# Patient Record
Sex: MALE | Race: WHITE | NOT HISPANIC OR LATINO | Employment: UNEMPLOYED | URBAN - METROPOLITAN AREA
[De-identification: names, ages, dates, MRNs, and addresses within clinical notes are randomized per-mention and may not be internally consistent; named-entity substitution may affect disease eponyms.]

---

## 2017-05-11 ENCOUNTER — TRANSFERRED RECORDS (OUTPATIENT)
Dept: HEALTH INFORMATION MANAGEMENT | Facility: CLINIC | Age: 57
End: 2017-05-11

## 2017-06-21 ENCOUNTER — TRANSFERRED RECORDS (OUTPATIENT)
Dept: HEALTH INFORMATION MANAGEMENT | Facility: CLINIC | Age: 57
End: 2017-06-21

## 2017-06-21 ENCOUNTER — MEDICAL CORRESPONDENCE (OUTPATIENT)
Dept: HEALTH INFORMATION MANAGEMENT | Facility: CLINIC | Age: 57
End: 2017-06-21

## 2017-07-05 ENCOUNTER — TRANSFERRED RECORDS (OUTPATIENT)
Dept: HEALTH INFORMATION MANAGEMENT | Facility: CLINIC | Age: 57
End: 2017-07-05

## 2017-09-08 ENCOUNTER — TRANSFERRED RECORDS (OUTPATIENT)
Dept: HEALTH INFORMATION MANAGEMENT | Facility: CLINIC | Age: 57
End: 2017-09-08

## 2017-09-18 ENCOUNTER — OFFICE VISIT (OUTPATIENT)
Dept: PHYSICAL MEDICINE AND REHAB | Facility: CLINIC | Age: 57
End: 2017-09-18

## 2017-09-18 VITALS
HEIGHT: 70 IN | DIASTOLIC BLOOD PRESSURE: 59 MMHG | HEART RATE: 63 BPM | WEIGHT: 195 LBS | BODY MASS INDEX: 27.92 KG/M2 | SYSTOLIC BLOOD PRESSURE: 132 MMHG

## 2017-09-18 DIAGNOSIS — N31.9 NEUROGENIC BLADDER: ICD-10-CM

## 2017-09-18 DIAGNOSIS — K59.2 NEUROGENIC BOWEL: ICD-10-CM

## 2017-09-18 DIAGNOSIS — M62.838 MUSCLE SPASTICITY: ICD-10-CM

## 2017-09-18 DIAGNOSIS — G82.20 PARAPLEGIA (H): Primary | ICD-10-CM

## 2017-09-18 PROBLEM — Z90.49 S/P CHOLECYSTECTOMY: Status: ACTIVE | Noted: 2017-06-19

## 2017-09-18 RX ORDER — IBUPROFEN 800 MG/1
800 TABLET, FILM COATED ORAL
COMMUNITY
Start: 2017-03-07 | End: 2018-04-22

## 2017-09-18 ASSESSMENT — PAIN SCALES - GENERAL: PAINLEVEL: MILD PAIN (3)
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
## 2017-09-18 NOTE — PATIENT INSTRUCTIONS
Start taking psyllium.  Would recommend a powder form.  Start with 1 tsp in at least 8 oz of fluid once per day.  Take it at the same time every day.  If things don't improve after 1-2 weeks, then increase to 1 tsp twice a day.  If still no improvement after taking it twice a day, call the clinic to talk with Dr. Belle.  Continue to do your bowel program daily.  Consistency is important!  Review the pamphlet about Peristeen - this might be the next step if fiber doesn't work.
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
## 2017-09-20 ENCOUNTER — TELEPHONE (OUTPATIENT)
Dept: PHYSICAL MEDICINE AND REHAB | Facility: CLINIC | Age: 57
End: 2017-09-20

## 2017-09-20 NOTE — TELEPHONE ENCOUNTER
Patient called stating that he did have a bowel movement yesterday, but afterwards he had severe leg spasticity and abdominal pain. His stool was soft formed. He usually has a BM very other day. He did not start the psyllium that Dr Belle suggested at the last appointment because he was afraid he would have diarrhea and more abdominal pain and spasms and sweating. He has no appetite Patient states that he does have hemmorroids and is wondering if that could be causing the pain and spasms. It was suggested to him to try Lidocaine gel when doing bowel program to help numb the area. Hopefully it will minimize the symptoms.

## 2017-09-22 ENCOUNTER — NURSE TRIAGE (OUTPATIENT)
Dept: NURSING | Facility: CLINIC | Age: 57
End: 2017-09-22

## 2017-09-22 ENCOUNTER — HOSPITAL ENCOUNTER (EMERGENCY)
Facility: CLINIC | Age: 57
Discharge: HOME | End: 2017-09-22
Attending: EMERGENCY MEDICINE | Admitting: EMERGENCY MEDICINE
Payer: MEDICARE

## 2017-09-22 ENCOUNTER — APPOINTMENT (OUTPATIENT)
Dept: GENERAL RADIOLOGY | Facility: CLINIC | Age: 57
End: 2017-09-22
Attending: EMERGENCY MEDICINE
Payer: MEDICARE

## 2017-09-22 VITALS
SYSTOLIC BLOOD PRESSURE: 111 MMHG | TEMPERATURE: 98.2 F | RESPIRATION RATE: 18 BRPM | OXYGEN SATURATION: 96 % | HEART RATE: 64 BPM | DIASTOLIC BLOOD PRESSURE: 70 MMHG

## 2017-09-22 DIAGNOSIS — G82.20 PARAPLEGIA (H): ICD-10-CM

## 2017-09-22 DIAGNOSIS — K62.89 RECTAL PAIN: ICD-10-CM

## 2017-09-22 DIAGNOSIS — K59.00 CONSTIPATION, UNSPECIFIED CONSTIPATION TYPE: ICD-10-CM

## 2017-09-22 DIAGNOSIS — K62.89 ANAL PAIN: ICD-10-CM

## 2017-09-22 DIAGNOSIS — K59.2 NEUROGENIC BOWEL: ICD-10-CM

## 2017-09-22 PROCEDURE — 74020 XR ABDOMEN 2 VW: CPT

## 2017-09-22 PROCEDURE — 99282 EMERGENCY DEPT VISIT SF MDM: CPT | Mod: Z6 | Performed by: EMERGENCY MEDICINE

## 2017-09-22 PROCEDURE — 99283 EMERGENCY DEPT VISIT LOW MDM: CPT | Performed by: EMERGENCY MEDICINE

## 2017-09-22 ASSESSMENT — ENCOUNTER SYMPTOMS
BACK PAIN: 0
RECTAL PAIN: 1
ADENOPATHY: 0
NECK PAIN: 0
CONSTIPATION: 1
LIGHT-HEADEDNESS: 0
BLOOD IN STOOL: 0
VOMITING: 0
ABDOMINAL DISTENTION: 0
NAUSEA: 0
FEVER: 0
DIARRHEA: 0
AGITATION: 0
SHORTNESS OF BREATH: 0
DIFFICULTY URINATING: 0
ABDOMINAL PAIN: 0
COLOR CHANGE: 0
BRUISES/BLEEDS EASILY: 0
CHILLS: 0
NECK STIFFNESS: 0
POLYDIPSIA: 0

## 2017-09-22 NOTE — ED AVS SNAPSHOT
[facility], Emergency Department    [address]    [place] [address]    Phone:  [phone]    Fax:  [phone]                                       Jose Lopez   MRN: [id number]    Department:  [facility], Emergency Department   Date of Visit:  9/22/2017           Patient Information     Date Of Birth          1960        Your diagnoses for this visit were:     Constipation, unspecified constipation type     Rectal pain        You were seen by Ludy Cota MD.        Discharge Instructions       Please make an appointment to follow up with Your Primary Care Provider in 3 days if you have any concerns. If your symptoms worsen please come back to the emergency department. Please start taking your stool softener that was prescribed to you by your physician.        Treating Constipation    Constipation is a common and often uncomfortable problem. Constipation means you have bowel movements fewer than 3 times per week, or strain to pass hard, dry stool. It can last a short time. Or it can be a problem that never seems to go away. The good news is that it can often be treated and controlled.  Eat more fiber  One of the best ways to help treat constipation is to increase your fiber intake. You can do this either through diet or by using fiber supplements. Fiber (in whole grains, fruits, and vegetables) adds bulk and absorbs water to soften the stool. This helps the stool pass through the colon more easily. When you increase your fiber intake, do it slowly to avoid side effects such as bloating. Also increase the amount of water that you drink. Eating more of the following foods can add fiber to your diet.    High-fiber cereals    Whole grains, bran, and brown rice    Vegetables such as carrots, broccoli, and greens    Fresh fruits (especially apples, pears, and dried fruits like raisins and apricots)    Nuts and legumes (especially beans such as lentils, kidney beans, and lima beans)  Get  physically active  Exercise helps improve the working of your colon which helps ease constipation. Try to get some physical activity every day. If you haven t been active for a while, talk to your healthcare provider before starting again.  Laxatives  Your healthcare provider may suggest an over-the-counter product to help ease your constipation. He or she may suggest the use of bulk-forming agents or laxatives. The use of laxatives, if used as directed, is common and safe. Follow directions carefully when using them. See your healthcare provider for new-onset constipation, or long-term constipation, to rule out other causes such as medicines or thyroid disease.  Date Last Reviewed: 7/1/2016 2000-2017 [employer]. [address] [place] [address], [place] [address]. All rights reserved. This information is not intended as a substitute for professional medical care. Always follow your healthcare professional's instructions.          Constipation (Adult)  Constipation means that you have bowel movements that are less frequent than usual. Stools often become very hard and difficult to pass.  Constipation is very common. At some point in life it affects almost everyone. Since everyone's bowel habits are different, what is constipation to one person may not be to another. Your healthcare provider may do tests to diagnose constipation. It depends on what he or she finds when evaluating you.    Symptoms of constipation include:    Abdominal pain    Bloating    Vomiting    Painful bowel movements    Itching, swelling, bleeding, or pain around the anus  Causes  Constipation can have many causes. These include:    Diet low in fiber    Too much dairy    Not drinking enough liquids    Lack of exercise or physical activity. This is especially true for older adults.    Changes in lifestyle or daily routine, including pregnancy, aging, work, and travel    Frequent use or misuse of laxatives    Ignoring the urge to have a  bowel movement or delaying it until later    Medicines, such as certain prescription pain medicines, iron supplements, antacids, certain antidepressants, and calcium supplements    Diseases like irritable bowel syndrome, bowel obstructions, stroke, diabetes, thyroid disease, Parkinson disease, hemorrhoids, and colon cancer  Complications  Potential complications of constipation can include:    Hemorrhoids    Rectal bleeding from hemorrhoids or anal fissures (skin tears)    Hernias    Dependency on laxatives    Chronic constipation    Fecal impaction    Bowel obstruction or perforation  Home care  All treatment should be done after talking with your healthcare provider. This is especially true if you have another medical problems, are taking prescription medicines, or are an older adult. Treatment most often involves lifestyle changes. You may also need medicines. Your healthcare provider will tell you which will work best for you. Follow the advice below to help avoid this problem in the future.  Lifestyle changes  These lifestyle changes can help prevent constipation:    Diet. Eat a high-fiber diet, with fresh fruit and vegetables, and reduce dairy intake, meats, and processed foods    Fluids. It's important to get enough fluids each day. Drink plenty of water when you eat more fiber. If you are on diet that limits the amount of fluid you can have, talk about this with your healthcare provider.    Regular exercise. Check with your healthcare provider first.  Medications  Take any medicines as directed. Some laxatives are safe to use only every now and then. Others can be taken on a regular basis. Talk with your doctor or pharmacist if you have questions.  Prescription pain medicines can cause constipation. If you are taking this kind of medicine, ask your healthcare provider if you should also take a stool softener.  Medicines you may take to treat constipation include:    Fiber supplements    Stool  softeners    Laxatives    Enemas    Rectal suppositories  Follow-up care  Follow up with your healthcare provider if symptoms don't get better in the next few days. You may need to have more tests or see a specialist.  Call 911  Call 911 if any of these occur:    Trouble breathing    Stiff, rigid abdomen that is severely painful to touch    Confusion    Fainting or loss of consciousness    Rapid heart rate    Chest pain  When to seek medical advice  Call your healthcare provider right away if any of these occur:    Fever over 100.4 F (38 C)    Failure to resume normal bowel movements    Pain in your abdomen or back gets worse    Nausea or vomiting    Swelling in your abdomen    Blood in the stool    Black, tarry stool    Involuntary weight loss    Weakness  Date Last Reviewed: 12/30/2015 2000-2017 [employer]. [address] [place] [address], [place] [address]. All rights reserved. This information is not intended as a substitute for professional medical care. Always follow your healthcare professional's instructions.            Future Appointments        Provider Department Dept Phone Center    11/29/2017 8:00 AM Jose Belle MD [facility] Physical Medicine and Rehabilitation [phone] [facility]      24 Hour Appointment Hotline       To make an appointment at any [facility], call [phone] ([phone]). If you don't have a family doctor or clinic, we will help you find one. [facility] clinics are conveniently located to serve the needs of you and your family.             Review of your medicines      Our records show that you are taking the medicines listed below. If these are incorrect, please call your family doctor or clinic.        Dose / Directions Last dose taken    baclofen 20 MG tablet   Commonly known as:  LIORESAL   Dose:  30 mg        Take 30 mg by mouth 4 times daily   Refills:  0        ibuprofen 800 MG tablet   Commonly known as:  ADVIL/MOTRIN   Dose:  800 mg        Take 800 mg by  "mouth   Refills:  0        klonoPIN 0.5 MG tablet   Dose:  0.5 mg   Generic drug:  clonazePAM        Take 0.5 mg by mouth 2 times daily   Refills:  0        oxybutynin 5 MG tablet   Commonly known as:  DITROPAN        Take by mouth 4 times daily   Refills:  0                Procedures and tests performed during your visit     Abdomen XR, 2 vw, flat and upright      Orders Needing Specimen Collection     None      Pending Results     Date and Time Order Name Status Description    9/22/2017 1004 Abdomen XR, 2 vw, flat and upright Preliminary             Pending Culture Results     No orders found from 9/20/2017 to 9/23/2017.            Pending Results Instructions     If you had any lab results that were not finalized at the time of your Discharge, you can call the ED Lab Result RN at [phone]. You will be contacted by this team for any positive Lab results or changes in treatment. The nurses are available 7 days a week from 10A to 6:30P.  You can leave a message 24 hours per day and they will return your call.        Thank you for choosing [facility]       Thank you for choosing [facility] for your care. Our goal is always to provide you with excellent care. Hearing back from our patients is one way we can continue to improve our services. Please take a few minutes to complete the written survey that you may receive in the mail after you visit with us. Thank you!        [employer] Information     [employer] lets you send messages to your doctor, view your test results, renew your prescriptions, schedule appointments and more. To sign up, go to [email or web address] . Click on \"Log in\" on the left side of the screen, which will take you to the Welcome page. Then click on \"Sign up Now\" on the right side of the page.     You will be asked to enter the access code listed below, as well as some personal information. Please follow the directions to create your username and password.     Your access code is: " [email or web address]  Expires: 10/16/2017  1:02 PM     Your access code will  in 90 days. If you need help or a new code, please call your [facility] clinic or [phone].        Care EveryWhere ID     This is your Care EveryWhere ID. This could be used by other organizations to access your [facility] medical records  [id number]        Equal Access to Services     [facility] Center[facility] : Hadii power venturao Sosocorro, waaxda luqadaha, qaybta kaalmada adeegyada, forrest torres . [facility] [phone].    ATENCIÓN: Si habla español, tiene a reyes disposición servicios gratuitos de asistencia lingüística. Llame al [phone].    We comply with applicable federal civil rights laws and [place] laws. We do not discriminate on the basis of race, color, national origin, age, disability sex, sexual orientation or gender identity.            After Visit Summary       This is your record. Keep this with you and show to your community pharmacist(s) and doctor(s) at your next visit.

## 2017-09-22 NOTE — TELEPHONE ENCOUNTER
Seen on Monday at the [facility]. Discussed bowel issues, neurogenic bowel. Thursday woke and he had to dig out stool for three hours. Unable to eat. He is in pain. He wants to be seen in the ER to get help with stool and get documentation for his MD at the [facility]. He also mentioned having trouble with urine. Advised the ER will see him about his bowel issues.  Ynes Cronin RN-[facility]

## 2017-09-22 NOTE — ED AVS SNAPSHOT
[facility], [place], Emergency Department    [address]    [facility] [address]    Phone:  [phone]    Fax:  [phone]                                       Jose Lopez   MRN: [id number]    Department:  [facility], Emergency Department   Date of Visit:  9/22/2017           After Visit Summary Signature Page     I have received my discharge instructions, and my questions have been answered. I have discussed any challenges I see with this plan with the nurse or doctor.    ..........................................................................................................................................  Patient/Patient Representative Signature      ..........................................................................................................................................  Patient Representative Print Name and Relationship to Patient    ..................................................               ................................................  Date                                            Time    ..........................................................................................................................................  Reviewed by Signature/Title    ...................................................              ..............................................  Date                                                            Time

## 2017-09-22 NOTE — ED PROVIDER NOTES
"  History     Chief Complaint   Patient presents with     Groin Pain     Rectal Bleeding     Bowel program yesterday with digital removal of large stool but has been having groin and rectal pain since.     HPI  Jose Lopez is a 57 year old male with a history of paraplegia and neurogenic bowel who presents with rectal pain. The patient reports that he has been seen by [facility] in the past but has now been sent into our care and being seen by Dr. Belle. The patient was seen by Dr. Belle on Monday, 4 days ago, and was started on 1 tsp of psyllium daily. Since, the patient reports that he is still having issues with his bowel movements. He reports that he has had a bowel movement 2 times since Monday but he had a hard bowel movement last night and has had severe pain since. The patient states that for 3 hours he was digging out stool. He states that he had so much stool that it \"tore him up.\" He reports rectalpain and anal pain. He denies rectal bleeding, abdominal, or pelvic pain. No fevers. The patient reports that he has been more clogged up lately than usual. We addressed the patient chief complaint of groin pain and states that he is not having groin pain and that he is here for pain in his anus.     I have reviewed the Medications, Allergies, Past Medical and Surgical History, and Social History in the Epic system.    Review of Systems   Constitutional: Negative for chills and fever.   HENT: Negative for congestion.    Eyes: Negative for visual disturbance.   Respiratory: Negative for shortness of breath.    Cardiovascular: Negative for chest pain.   Gastrointestinal: Positive for constipation and rectal pain. Negative for abdominal distention, abdominal pain, blood in stool, diarrhea, nausea and vomiting.   Endocrine: Negative for polydipsia and polyuria.   Genitourinary: Negative for difficulty urinating.   Musculoskeletal: Negative for back pain, neck pain and neck stiffness.   Skin: Negative for " color change.   Allergic/Immunologic: Negative for immunocompromised state.   Neurological: Negative for light-headedness.   Hematological: Negative for adenopathy. Does not bruise/bleed easily.   Psychiatric/Behavioral: Negative for agitation and behavioral problems.       Physical Exam   BP: 111/70  Pulse: 64  Temp: 98.2  F (36.8  C)  Resp: 17  SpO2: 96 %  Physical Exam   Constitutional: He is oriented to person, place, and time. He appears well-developed and well-nourished. No distress.   HENT:   Head: Normocephalic and atraumatic.   Mouth/Throat: Oropharynx is clear and moist. No oropharyngeal exudate.   Eyes: Conjunctivae and EOM are normal. No scleral icterus.   Neck: Normal range of motion.   Cardiovascular: Normal heart sounds and intact distal pulses.    Pulmonary/Chest: Breath sounds normal. No respiratory distress.   Abdominal: Soft. Bowel sounds are normal. He exhibits no distension. There is no tenderness. There is no rebound and no guarding.   Genitourinary: Penis normal. Rectal exam shows no external hemorrhoid, no internal hemorrhoid, no fissure, no tenderness and anal tone normal. Right testis shows no swelling and no tenderness. Left testis shows no swelling and no tenderness. No penile tenderness.   Genitourinary Comments: Several skin tags perianally. No edema, erythema, drainage, or bleeding surrounding and from anus. Minimal stool on digital rectal examination; stool is light brown. No tenderness with digital rectal exam.   Musculoskeletal: He exhibits no edema or tenderness.   Neurological: He is alert and oriented to person, place, and time. No cranial nerve deficit. Coordination ( T9 paraplegia) abnormal.   Skin: Skin is warm. No rash noted. He is not diaphoretic. No erythema.   Psychiatric: He has a normal mood and affect. His behavior is normal. Judgment and thought content normal.   Nursing note and vitals reviewed.      ED Course   9:40 AM  The patient was seen and examined by Dr. Cota  in Room 03.     ED Course     Procedures             Critical Care time:  none             Labs Ordered and Resulted from Time of ED Arrival Up to the Time of Departure from the ED - No data to display         Assessments & Plan (with Medical Decision Making)   57-year-old man presenting with anal/rectal pain since yesterday afternoon. Differential diagnosis: anal fissure, abrasion, constipation, perforated rectum.    After thorough history and physical exam patient appears to be in no acute distress. I do not see any signs of perianal or potentially perirectal abscess on the examination. No evidence of fissure. I will obtain an abdominal x-ray looking for any evidence of stool burden proximally or free air. I do not suspect the patient has small bowel obstruction or large bowel obstruction. Also, I do not believe he has an acute appendicitis.     I reviewed the patient s x-ray and I read the radiology report; there is no evidence of free air under the diaphragm. There is a significant amount of fecal material in the rectal region. This could be contributing to the patient s discomfort. I will treat him with a pink lady enema. He agrees with the plan.     I informed the patient of x-ray findings and the need for pink lady enema. He has declined enema stating that due to the lack of anal tone enema will not be effective. It will likely come out immediately. I believe that he is right. He would prefer to go home and take his oral stool softener that he was given by his physician earlier this week. At this point he is stable for discharge. He agrees to return if his symptoms worsen otherwise he will follow up with his primary physician.     I have reviewed the nursing notes.    I have reviewed the findings, diagnosis, plan and need for follow up with the patient.    Discharge Medication List as of 9/22/2017 11:21 AM          Final diagnoses:   Constipation, unspecified constipation type   Rectal pain   Ramses CASILLAS  APURVA Appiah, am serving as a trained medical scribe to document services personally performed by Ludy Cota MD, based on the provider's statements to me.   I, Ludy Cota MD, was physically present and have reviewed and verified the accuracy of this note documented by Ramses Appiah.      9/22/2017   [facility], [place], EMERGENCY DEPARTMENT     Ludy Cota MD  09/22/17 1198

## 2017-09-22 NOTE — DISCHARGE INSTRUCTIONS
Please make an appointment to follow up with Your Primary Care Provider in 3 days if you have any concerns. If your symptoms worsen please come back to the emergency department. Please start taking your stool softener that was prescribed to you by your physician.        Treating Constipation    Constipation is a common and often uncomfortable problem. Constipation means you have bowel movements fewer than 3 times per week, or strain to pass hard, dry stool. It can last a short time. Or it can be a problem that never seems to go away. The good news is that it can often be treated and controlled.  Eat more fiber  One of the best ways to help treat constipation is to increase your fiber intake. You can do this either through diet or by using fiber supplements. Fiber (in whole grains, fruits, and vegetables) adds bulk and absorbs water to soften the stool. This helps the stool pass through the colon more easily. When you increase your fiber intake, do it slowly to avoid side effects such as bloating. Also increase the amount of water that you drink. Eating more of the following foods can add fiber to your diet.    High-fiber cereals    Whole grains, bran, and brown rice    Vegetables such as carrots, broccoli, and greens    Fresh fruits (especially apples, pears, and dried fruits like raisins and apricots)    Nuts and legumes (especially beans such as lentils, kidney beans, and lima beans)  Get physically active  Exercise helps improve the working of your colon which helps ease constipation. Try to get some physical activity every day. If you haven t been active for a while, talk to your healthcare provider before starting again.  Laxatives  Your healthcare provider may suggest an over-the-counter product to help ease your constipation. He or she may suggest the use of bulk-forming agents or laxatives. The use of laxatives, if used as directed, is common and safe. Follow directions carefully when using them. See your  healthcare provider for new-onset constipation, or long-term constipation, to rule out other causes such as medicines or thyroid disease.  Date Last Reviewed: 7/1/2016 2000-2017 [employer]. [address] [place] [address], [place] [address]. All rights reserved. This information is not intended as a substitute for professional medical care. Always follow your healthcare professional's instructions.          Constipation (Adult)  Constipation means that you have bowel movements that are less frequent than usual. Stools often become very hard and difficult to pass.  Constipation is very common. At some point in life it affects almost everyone. Since everyone's bowel habits are different, what is constipation to one person may not be to another. Your healthcare provider may do tests to diagnose constipation. It depends on what he or she finds when evaluating you.    Symptoms of constipation include:    Abdominal pain    Bloating    Vomiting    Painful bowel movements    Itching, swelling, bleeding, or pain around the anus  Causes  Constipation can have many causes. These include:    Diet low in fiber    Too much dairy    Not drinking enough liquids    Lack of exercise or physical activity. This is especially true for older adults.    Changes in lifestyle or daily routine, including pregnancy, aging, work, and travel    Frequent use or misuse of laxatives    Ignoring the urge to have a bowel movement or delaying it until later    Medicines, such as certain prescription pain medicines, iron supplements, antacids, certain antidepressants, and calcium supplements    Diseases like irritable bowel syndrome, bowel obstructions, stroke, diabetes, thyroid disease, Parkinson disease, hemorrhoids, and colon cancer  Complications  Potential complications of constipation can include:    Hemorrhoids    Rectal bleeding from hemorrhoids or anal fissures (skin tears)    Hernias    Dependency on laxatives    Chronic  constipation    Fecal impaction    Bowel obstruction or perforation  Home care  All treatment should be done after talking with your healthcare provider. This is especially true if you have another medical problems, are taking prescription medicines, or are an older adult. Treatment most often involves lifestyle changes. You may also need medicines. Your healthcare provider will tell you which will work best for you. Follow the advice below to help avoid this problem in the future.  Lifestyle changes  These lifestyle changes can help prevent constipation:    Diet. Eat a high-fiber diet, with fresh fruit and vegetables, and reduce dairy intake, meats, and processed foods    Fluids. It's important to get enough fluids each day. Drink plenty of water when you eat more fiber. If you are on diet that limits the amount of fluid you can have, talk about this with your healthcare provider.    Regular exercise. Check with your healthcare provider first.  Medications  Take any medicines as directed. Some laxatives are safe to use only every now and then. Others can be taken on a regular basis. Talk with your doctor or pharmacist if you have questions.  Prescription pain medicines can cause constipation. If you are taking this kind of medicine, ask your healthcare provider if you should also take a stool softener.  Medicines you may take to treat constipation include:    Fiber supplements    Stool softeners    Laxatives    Enemas    Rectal suppositories  Follow-up care  Follow up with your healthcare provider if symptoms don't get better in the next few days. You may need to have more tests or see a specialist.  Call 911  Call 911 if any of these occur:    Trouble breathing    Stiff, rigid abdomen that is severely painful to touch    Confusion    Fainting or loss of consciousness    Rapid heart rate    Chest pain  When to seek medical advice  Call your healthcare provider right away if any of these occur:    Fever over 100.4 F  (38 C)    Failure to resume normal bowel movements    Pain in your abdomen or back gets worse    Nausea or vomiting    Swelling in your abdomen    Blood in the stool    Black, tarry stool    Involuntary weight loss    Weakness  Date Last Reviewed: 12/30/2015 2000-2017 [employer] [address] [place] [address]. All rights reserved. This information is not intended as a substitute for professional medical care. Always follow your healthcare professional's instructions.

## 2017-09-26 ENCOUNTER — TELEPHONE (OUTPATIENT)
Dept: PHYSICAL MEDICINE AND REHAB | Facility: CLINIC | Age: 57
End: 2017-09-26

## 2017-09-26 NOTE — TELEPHONE ENCOUNTER
----- Message from Cassandra Nolan RN sent at 9/26/2017  9:51 AM CDT -----  Regarding: FW: question  Contact: [phone]      ----- Message -----     From: Gladys Reyes LPN     Sent: 9/26/2017   7:55 AM       To: [facility]-  Subject: question                                         Caller name: patient [phone]    Treating provider/specialty: Yoshi    Nurse:    Best time to return call:    Message left?     Description of issue/question:  Said he took Psyllium aand on Fri am, he got up and was incont of runny stool and hasnt taken it since.  please address.  Encouraged patient to take psyllium daily to decrease constipation and diarrhea.

## 2017-09-26 NOTE — TELEPHONE ENCOUNTER
Returned call to Mr. Lopez. He took psyllium Thursday mid day and by Friday AM had loose incontinent bowel movement so he stopped and hasn't taken psyllium since. Hasn't had any subsequent BM's either and starting to cramp again.  I reiterated that he needs to stick with something for longer to get a sense of it's efficacy and steady state pattern impact on bowel regularity. For most people, fiber supplement won't cause a blow out diarrhea and more likely this represents another swing of built up stool.   Encouraged consistent use of the fiber, starting 1 teaspoon per day and follow pattern over time. Don't anticipate we'll get to perfect and zero incontinence given his loose sphincter but rather target overall improvement with continence and less to no abdominal pain and cramping.  From here our only options are bowel irrigation or surgery with ACE or colostomy.

## 2017-09-28 ENCOUNTER — TELEPHONE (OUTPATIENT)
Dept: PHYSICAL MEDICINE AND REHAB | Facility: CLINIC | Age: 57
End: 2017-09-28
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
## 2017-09-29 NOTE — TELEPHONE ENCOUNTER
"Return telephone call to Mr. Lopez. He is having ongoing significant \"burning sensations like hot sauce and sand going through me\". He wonders if this is typical for the psyllium which I was prescribing. He says he had increased cramping and could not sleep at all at night. He is pushing for what we can do next though I'm challenged in knowing where to go from here. While this description is slightly different than what he used with me in clinic, much of his challenges with his abdomen cramping and consistency with bowels have been challenges for him well before starting psyllium.    He has had extensive workup in different systems with numerous providers apparently without any clear ideas or answers. I would not expect psyllium to cause this burning type sensation. Constipation or other nociceptive pain generation (even if she does not localize well because of his spinal cord injury) can exacerbate spasticity and in turn abdominal muscle tightness but this may be difficult to differentiate from intestinal cramping.    I believe Jose would be best served continuing the workup through the providers that already have seen him extensively though it sounds as if did not have clear follow-up. I'm not clear what has been done through [employer] / [facility]. He says they were going to assign a care coordinator but that did not yet happened. He also identifies being told in a clinic visit that there was too much to cover and he would have to reschedule which frustrated him. I can appreciate given the nature of his dialogue during my consultation and a few times now on the phone that there may be some challenge in being efficient in a clinic visit setting. Regardless I believe this continuity of providers in knowing him over time would have more benefit than exploring new or different workup in a different system.    I am happy if he chooses to continue following him for spinal cord injury related issues including " neurogenic bowel. My insights may be more limited to finding balance in the constipation/diarrhea spectrum as well as improving continence. My next step from here would be bowel irrigation trial and after that he may well need anterograde colonic enema or colostomy surgery. I defer back to his primary provider and system any further workup for the gastroenterology or other providers. He says tomorrow morning he will call and reschedule with them.    For now if he really believes there is a link between feeling worse on the psyllium than he could stop that though I am not yet convinced this represents a failure of some of the bowel consistency that high-fiber may provide him.

## 2017-11-29 ENCOUNTER — OFFICE VISIT (OUTPATIENT)
Dept: PHYSICAL MEDICINE AND REHAB | Facility: CLINIC | Age: 57
End: 2017-11-29

## 2017-11-29 VITALS
SYSTOLIC BLOOD PRESSURE: 140 MMHG | OXYGEN SATURATION: 96 % | BODY MASS INDEX: 29.35 KG/M2 | WEIGHT: 205 LBS | HEIGHT: 70 IN | DIASTOLIC BLOOD PRESSURE: 75 MMHG | HEART RATE: 80 BPM

## 2017-11-29 DIAGNOSIS — G82.20 PARAPLEGIA (H): ICD-10-CM

## 2017-11-29 DIAGNOSIS — M62.838 MUSCLE SPASTICITY: ICD-10-CM

## 2017-11-29 DIAGNOSIS — N31.9 NEUROGENIC BLADDER: Primary | ICD-10-CM

## 2017-11-29 RX ORDER — CIPROFLOXACIN 500 MG/1
500 TABLET, FILM COATED ORAL
COMMUNITY
Start: 2017-11-27 | End: 2018-03-28

## 2017-11-29 RX ORDER — SILDENAFIL CITRATE 100 MG
100 TABLET ORAL PRN
Refills: 3 | COMMUNITY
Start: 2017-09-19 | End: 2023-05-13

## 2017-11-29 ASSESSMENT — PAIN SCALES - GENERAL: PAINLEVEL: NO PAIN (0)

## 2017-11-29 NOTE — PROGRESS NOTES
"Physical medicine repetition clinic note:    Jose Lopez is a 57-year-old with long-standing T9 FAITH A SCI, neurogenic bladder, neurogenic bowel who returns to the clinic having seen me for first rehabilitation consultation. He came to me in particular with challenges around his bowel management. He was having a lot of inconsistency and pain. He's had several emergency department visits, workup with other rehab physicians, gastroenterology and colorectal surgery. He historically was utilizing digital stimulation and it was a bit more effective but that is subsequently been much less consistent. At times he can go several days between bowel movements associated with very large movements. He has tried numerous different medications to address the generally more constipated end of spectrum. He has abdominal pain and spasms which can also impact his bladder with leaking during these times. He has had some urine infections and recently treated with antibiotic. His past workup also included 2 different colonoscopies, cholecystectomy, hemorrhoidectomy. He reports having recurrence of some hemorrhoids with some intermittent bleeding.     Her last visit did a lot of education around consistency of program. Tried adding psyllium which he found translate into more cramping and pain and did not normalize bowels very well. He is subsequently been taking just 100 mg of docusate most days but may skip here and there. We also discussed the concept of bowel irrigation program but we're wanting to finish optimizing a few other options first. Reports mentioning this to his other providers from gastroenterology and they agree with pursuing bowel irrigation.    Physical exam  /75 (BP Location: Right arm, Patient Position: Sitting, Cuff Size: Adult Large)  Pulse 80  Ht 5' 10\" (1.778 m)  Wt 205 lb (93 kg)  SpO2 96%  BMI 29.41 kg/m2   Alert, fluent speech and language. Arrives with manual wheelchair which appears to be fitting " well. No involuntary spasms noted in his lower extremities. No volitional movement in his legs consistent with his paraplegia.    Assessment and recommendations  1. Jose has significant difficulty regulating his neurogenic bowel, has tried numerous different approaches as detailed above. I'm unaware of anything else that could be explored to further optimize this other than pursuing bowel irrigation. I believe this would be more effective at emptying consistently without rebound loose stools or incontinence. He may be able to tolerate increased softeners which would promote better transit, less constipation but avoid the incontinence. At further education today with him including watching video on our clinic room computer.   2. I will write a formal letter of medical necessity and pursue approval of this and identifying medical vendor. He uses [employer] for his catheter supplies, currently [employer] straight 14 [place].   3. We will schedule follow-up with him when we have better sense of the approval process and can begin more thorough training.  4. In the meantime he will continue with docusate 100 mg I encouraged more consistent utilization of this every day. We'll also uses digital stimulation and other education that we have given.   5. Recommended changes to his neurogenic bladder management, some of the inconsistencies with that could be attributed to inconsistencies with the bowel program which we will focus on first.  6. Similarly spasticity optimization will be deferred until the bowels are better regulated. For the most part this is reasonable controlled.     40 minutes spent in direct patient interaction, greater than 80% in education and counseling.

## 2017-11-29 NOTE — PATIENT INSTRUCTIONS
Peristeen Bowel irrigation system  See below video or search for other videos from the company ([employer]) for further information  https://youtu.be/[email or web address]

## 2017-11-29 NOTE — LETTER
11/29/2017       RE: Jose Lopez  [address]  [facility] [address]     Dear Colleague,    Thank you for referring your patient, Jose Lopez, to the [facility] PHYSICAL MEDICINE AND REHABILITATION at [facility]. Please see a copy of my visit note below.    Physical medicine repetition clinic note:    Jose Lopez is a 57-year-old with long-standing T9 FAITH A SCI, neurogenic bladder, neurogenic bowel who returns to the clinic having seen me for first rehabilitation consultation. He came to me in particular with challenges around his bowel management. He was having a lot of inconsistency and pain. He's had several emergency department visits, workup with other rehab physicians, gastroenterology and colorectal surgery. He historically was utilizing digital stimulation and it was a bit more effective but that is subsequently been much less consistent. At times he can go several days between bowel movements associated with very large movements. He has tried numerous different medications to address the generally more constipated end of spectrum. He has abdominal pain and spasms which can also impact his bladder with leaking during these times. He has had some urine infections and recently treated with antibiotic. His past workup also included 2 different colonoscopies, cholecystectomy, hemorrhoidectomy. He reports having recurrence of some hemorrhoids with some intermittent bleeding.     Her last visit did a lot of education around consistency of program. Tried adding psyllium which he found translate into more cramping and pain and did not normalize bowels very well. He is subsequently been taking just 100 mg of docusate most days but may skip here and there. We also discussed the concept of bowel irrigation program but we're wanting to finish optimizing a few other options first. Reports mentioning this to his other providers from gastroenterology and they agree with  "pursuing bowel irrigation.    Physical exam  /75 (BP Location: Right arm, Patient Position: Sitting, Cuff Size: Adult Large)  Pulse 80  Ht 5' 10\" (1.778 m)  Wt 205 lb (93 kg)  SpO2 96%  BMI 29.41 kg/m2   Alert, fluent speech and language. Arrives with manual wheelchair which appears to be fitting well. No involuntary spasms noted in his lower extremities. No volitional movement in his legs consistent with his paraplegia.    Assessment and recommendations  1. Jose has significant difficulty regulating his neurogenic bowel, has tried numerous different approaches as detailed above. I'm unaware of anything else that could be explored to further optimize this other than pursuing bowel irrigation. I believe this would be more effective at emptying consistently without rebound loose stools or incontinence. He may be able to tolerate increased softeners which would promote better transit, less constipation but avoid the incontinence. At further education today with him including watching video on our clinic room computer.   2. I will write a formal letter of medical necessity and pursue approval of this and identifying medical vendor. He uses [employer] for his catheter supplies, currently [employer] straight 14 [place].   3. We will schedule follow-up with him when we have better sense of the approval process and can begin more thorough training.  4. In the meantime he will continue with docusate 100 mg I encouraged more consistent utilization of this every day. We'll also uses digital stimulation and other education that we have given.   5. Recommended changes to his neurogenic bladder management, some of the inconsistencies with that could be attributed to inconsistencies with the bowel program which we will focus on first.  6. Similarly spasticity optimization will be deferred until the bowels are better regulated. For the most part this is reasonable controlled.     40 minutes spent in direct patient " interaction, greater than 80% in education and counseling.    Again, thank you for allowing me to participate in the care of your patient.      Sincerely,    Jose Belle MD

## 2017-11-29 NOTE — MR AVS SNAPSHOT
After Visit Summary   11/29/2017    Jose Lopez    MRN: [id number]           Patient Information     Date Of Birth          1960        Visit Information        Provider Department      11/29/2017 8:00 AM Jose Belle MD [facility] Physical Medicine and Rehabilitation        Today's Diagnoses     Neurogenic bladder    -  1    Paraplegia (H)        Muscle spasticity          Care Instructions    Peristeen Bowel irrigation system  See below video or search for other videos from the company ([employer]) for further information  https://youtu.be/[email or web address]          Follow-ups after your visit        Follow-up notes from your care team     Return in about 2 months (around 1/29/2018).      Your next 10 appointments already scheduled     Mar 28, 2018  2:20 PM CDT   (Arrive by 2:05 PM)   Return Visit with Jose Belle MD   [facility] Physical Medicine and Rehabilitation ([facility])    [address] [place] [address] [phone]              Who to contact     Please call your clinic at [phone] to:    Ask questions about your health    Make or cancel appointments    Discuss your medicines    Learn about your test results    Speak to your doctor   If you have compliments or concerns about an experience at your clinic, or if you wish to file a complaint, please contact [facility] Patient Relations at [phone] or email us at [email or web address]         Additional Information About Your Visit        MyChart Information     [employer] is an electronic gateway that provides easy, online access to your medical records. With [employer], you can request a clinic appointment, read your test results, renew a prescription or communicate with your care team.     To sign up for [employer] visit the website at [email or web address]   You will be asked to enter the access code listed below, as well as some personal  "information. Please follow the directions to create your username and password.     Your access code is: [email or web address]  Expires: 2018  6:30 AM     Your access code will  in 90 days. If you need help or a new code, please contact your [facility] or call [phone] for assistance.        Care EveryWhere ID     This is your Care EveryWhere ID. This could be used by other organizations to access your [facility] medical records  [id number]        Your Vitals Were     Pulse Height Pulse Oximetry BMI (Body Mass Index)          80 1.778 m (5' 10\") 96% 29.41 kg/m2         Blood Pressure from Last 3 Encounters:   No data found for BP    Weight from Last 3 Encounters:   No data found for Wt              Today, you had the following     No orders found for display       Primary Care Provider Office Phone # Fax #    [facility] [phone] [address]  [facility] [address]        Equal Access to Services     JAYLEN DOUGLASS : Hadii aad ku hadasho Soomaali, waaxda luqadaha, qaybta kaalmada adeegyada, waxay idiin hayaan adeeg kharadileep torres . So [facility] [phone].    ATENCIÓN: Si habla español, tiene a reyes disposición servicios gratuitos de asistencia lingüística. Llame al [phone].    We comply with applicable federal civil rights laws and [place] laws. We do not discriminate on the basis of race, color, national origin, age, disability, sex, sexual orientation, or gender identity.            Thank you!     Thank you for choosing [facility] PHYSICAL MEDICINE AND REHABILITATION  for your care. Our goal is always to provide you with excellent care. Hearing back from our patients is one way we can continue to improve our services. Please take a few minutes to complete the written survey that you may receive in the mail after your visit with us. Thank you!             Your Updated Medication List - Protect others around you: Learn how to " safely use, store and throw away your medicines at www.disposemymeds.org.          This list is accurate as of: 11/29/17 11:59 PM.  Always use your most recent med list.                   Brand Name Dispense Instructions for use Diagnosis    baclofen 20 MG tablet    LIORESAL     Take 30 mg by mouth 4 times daily        ciprofloxacin 500 MG tablet    CIPRO     Take 500 mg by mouth 2 times daily        ibuprofen 800 MG tablet    ADVIL/MOTRIN     Take 800 mg by mouth        klonoPIN 0.5 MG tablet   Generic drug:  clonazePAM      Take 0.5 mg by mouth 2 times daily        omeprazole 20 MG CR capsule    priLOSEC     Take 20 mg by mouth daily        oxybutynin 5 MG tablet    DITROPAN     Take by mouth 4 times daily        VIAGRA 100 MG tablet   Generic drug:  sildenafil      Take 100 mg by mouth as needed

## 2018-03-28 ENCOUNTER — OFFICE VISIT (OUTPATIENT)
Dept: PHYSICAL MEDICINE AND REHAB | Facility: CLINIC | Age: 58
End: 2018-03-28
Payer: COMMERCIAL

## 2018-03-28 VITALS
BODY MASS INDEX: 27.92 KG/M2 | SYSTOLIC BLOOD PRESSURE: 126 MMHG | HEART RATE: 84 BPM | HEIGHT: 70 IN | WEIGHT: 195 LBS | DIASTOLIC BLOOD PRESSURE: 78 MMHG

## 2018-03-28 DIAGNOSIS — M62.838 MUSCLE SPASTICITY: ICD-10-CM

## 2018-03-28 DIAGNOSIS — K59.2 NEUROGENIC BOWEL: ICD-10-CM

## 2018-03-28 DIAGNOSIS — G82.20 PARAPLEGIA (H): Primary | ICD-10-CM

## 2018-03-28 RX ORDER — TIZANIDINE 2 MG/1
2-4 TABLET ORAL 3 TIMES DAILY
Qty: 180 TABLET | Refills: 6 | Status: ON HOLD | OUTPATIENT
Start: 2018-03-28 | End: 2018-12-04

## 2018-03-28 ASSESSMENT — PAIN SCALES - GENERAL: PAINLEVEL: MILD PAIN (3)

## 2018-03-28 NOTE — LETTER
"3/28/2018       RE: Jose Lopez  [address]  [facility] [address]     Dear Colleague,    Thank you for referring your patient, Jose Lopez, to the [facility] PHYSICAL MEDICINE AND REHABILITATION at [facility]. Please see a copy of my visit note below.    Physical medicine rehabilitation clinic note:    Mr. Lopez is a 57-year-old with paraplegia spasticity neurogenic bowel and bladder.  Returns to rehab clinic having last seen me November 29.  Predominance of our discussion and prior visits have been around bowel management.  He has inconsistencies and a lot of frustrations.  He has had extensive follow-up by different providers.  Despite all of our extended conversations in the past, he comes today without clear knowledge of the specific medications he is on.  He describes them as the red orange or brown pill.  He knows some names like Dulcolax but is not certain if that is bisacodyl.  He does seem he had some more consistency for a while since her last visit in November.  Best guess is he was utilizing docusate 1 tablet daily and having bowel movements most days. He then reports this last weekend was awful with severe pain and needed to confine himself to bed and not eat for Friday and Saturday.  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  After feeling a bit backed up, he took 2 \"Dulcolax\" (I suspect but not confirming bisacodyl) after which he had the severe pain.  During her visit he called his significant other and she read some of the names on the packages.  The original boxes are missing.  There is \"Senokot extra\", Dulcolax, something for \"gas relief\". In the past with my visit originally we had tried psyllium but he felt that made it worse and the stools became \"sand\".    Spasticity is other prominent concern.  He has been on long-standing clonazepam with sounds like " "0.5 mg once daily.  His current primary provider did give him a prescription but only until he can follow-up with me and I would need to prescribe it after that.  That is his initial goal but really his primary overriding goal is to come off of any medications but he is just wanting to have reasonable spasm management.  He does take baclofen 30 mg 4 times daily when she is done a long time.  He has not been on tizanidine dantrolene nor any injections per his recollection.  Spasms occur in both legs.  They can have some extensor synergy patterns but also flexion.    Assessment and recommendations:  1. For bowels we explored further the different types of medication and active ingredients.  We looked at pictures online for pills and descriptions.  In his after visit summary we took the time before he left detailing the different generic, common trade names and descriptions as well as typical doses.  As copy below what was given in his discharge instructions.  2. Emphasized he cannot give up on one pattern if he has short-term changes especially if they can be attributed to other issues.  For example taking \"Dulcolax\" after he is already considerably backed up may be associated with cramping but if he is doing things regularly, low-dose may not have that pain.    3. Emphasized importance of good hydration and consistency of bowel program.   4. Continue the digital stimulation augmentation.  5. For spasticity, I will not prescribe clonazepam.  I am okay prescribing some alternate spasticity management.  We will begin with tizanidine titrating dose to efficacy.  Will begin 2-4 mg 3 times daily.  Will also allow him to take different dose at different times especially for example if he has more spasms at night, higher dose could be in the evening.  6. Could subsequently entertain dantrolene.  Would want to check liver function tests before pursuing that.  7. He may benefit from botulinum toxin injections if other oral " "management options prove not sufficiently effective.  8. We will follow-up with Mr. Lopez in 6 months time.    70 minutes spent in direct patient interaction, greater than 80% of which in education and counseling as detailed above.      Copy of what he had in d/c instructions:  Active ingredients is what we care about. I don't care what brand but need to pay attention to the ingredients:  1. Docusate often referred to a \"stool softener\" usually comes in 100 mg tablet each. Trade names Colace.  2. Senna, sometimes referred to as \"natural laxitive\", usually comes in 8.6 mg per tablet each. Trade name Sennokot  3. Bisacodyl, often referred to as \"laxitive\", usually comes in 5 or 10 mg tablets. Trade name Ducolax. This one increases the wave action which if blocked up, may cause cramping.   4. Psyllium is a fiber supplement which generally adds bulk. Trade names Citrucel, Metamucil, Benefiber... You report in past this caused it to come out as sand.  5. Polyethylene glycol, also know as Miralax keeps the stool softer.    Many of the brand names may be confusing and have other ingredients. For example Ducolax may not be bisacodyl.    What and how much. Either one may be adjusted.    Consistency is the key. Similar pattern each day.      Spasticity:  Stop the clonazepam  Continue baclofen for now 30 mg 4 x per day (or could try 40 mg 3 times per day)  Start tizanidine 2 mg 3 times per day but may increase to 4 mg 3 times per day. If this is effective then great. If not, we could go up a bit higher dose. Max would be 8 mg 3 times per day. Call us if you need higher doses. Also OK to use different dose at different times a day. For example 2 mg AM, 2 mg mid day and 4 or 6 mg at bedtime. Or just take it at bedtime. You can play with the dose and timing to be sure.    Again, thank you for allowing me to participate in the care of your patient.      Sincerely,    Jose Belle MD      "

## 2018-03-28 NOTE — MR AVS SNAPSHOT
"              After Visit Summary   3/28/2018    Jose Lopez    MRN: [id number]           Patient Information     Date Of Birth          1960        Visit Information        Provider Department      3/28/2018 9:40 AM Jose Belle MD [facility] Physical Medicine and Rehabilitation        Today's Diagnoses     Muscle spasticity    -  1      Care Instructions    Active ingredients is what we care about. I don't care what brand but need to pay attention to the ingredients:  1. Docusate often referred to a \"stool softener\" usually comes in 100 mg tablet each. Trade names Colace.  2. Senna, sometimes referred to as \"natural laxitive\", usually comes in 8.6 mg per tablet each. Trade name Sennokot  3. Bisacodyl, often referred to as \"laxitive\", usually comes in 5 or 10 mg tablets. Trade name Ducolax. This one increases the wave action which if blocked up, may cause cramping.   4. Psyllium is a fiber supplement which generally adds bulk. Trade names Citrucel, Metamucil, Benefiber... You report in past this caused it to come out as sand.  5. Polyethylene glycol, also know as Miralax keeps the stool softer.    Many of the brand names may be confusing and have other ingredients. For example Ducolax may not be bisacodyl.    What and how much. Either one may be adjusted.    Consistency is the key. Similar pattern each day.      Spasticity:  Stop the clonazepam  Continue baclofen for now 30 mg 4 x per day (or could try 40 mg 3 times per day)  Start tizanidine 2 mg 3 times per day but may increase to 4 mg 3 times per day. If this is effective then great. If not, we could go up a bit higher dose. Max would be 8 mg 3 times per day. Call us if you need higher doses. Also OK to use different dose at different times a day. For example 2 mg AM, 2 mg mid day and 4 or 6 mg at bedtime. Or just take it at bedtime. You can play with the dose and timing to be sure.            Follow-ups after your visit        Follow-up notes from your " "care team     Return in about 6 months (around 2018).      Your next 10 appointments already scheduled     Oct 03, 2018  9:40 AM CDT   (Arrive by 9:25 AM)   Return Visit with Jose Belle MD   [facility] Physical Medicine and Rehabilitation ([facility] [place])    [address] [address] [phone]              Who to contact     Please call your clinic at [phone] to:    Ask questions about your health    Make or cancel appointments    Discuss your medicines    Learn about your test results    Speak to your doctor            Additional Information About Your Visit        [employer] Information     [employer] is an electronic gateway that provides easy, online access to your medical records. With [employer], you can request a clinic appointment, read your test results, renew a prescription or communicate with your care team.     To sign up for [employer] visit the website at [email or web address]   You will be asked to enter the access code listed below, as well as some personal information. Please follow the directions to create your username and password.     Your access code is: [email or web address]  Expires: 2018  6:30 AM     Your access code will  in 90 days. If you need help or a new code, please contact your [facility] or call [phone] for assistance.        Care EveryWhere ID     This is your Care EveryWhere ID. This could be used by other organizations to access your [place] medical records  [id number]        Your Vitals Were     Pulse Height BMI (Body Mass Index)             84 5' 10\" (1.778 m) 27.98 kg/m2          Blood Pressure from Last 3 Encounters:   18 126/78   17 140/75   17 111/70    Weight from Last 3 Encounters:   18 195 lb (88.5 kg)   17 205 lb (93 kg)   17 195 lb (88.5 kg)              Today, you had the following     No orders found for display         Today's " Medication Changes          These changes are accurate as of 3/28/18 10:50 AM.  If you have any questions, ask your nurse or doctor.               Start taking these medicines.        Dose/Directions    tiZANidine 2 MG tablet   Commonly known as:  ZANAFLEX   Used for:  Muscle spasticity   Started by:  Jose Belle MD        Dose:  2-4 mg   Take 1-2 tablets (2-4 mg) by mouth 3 times daily   Quantity:  180 tablet   Refills:  6            Where to get your medicines      These medications were sent to [facility]/pharmacy #[id number] - [facility] [address] [address]     Phone:  [phone]     tiZANidine 2 MG tablet                Primary Care Provider Office Phone # Fax #    [facility] [phone] [address]  [facility] [address]        Equal Access to Services     [facility]: Hadii power epperson hadasho Soomaali, waaxda luqadaha, qaybta kaalmada adeegyada, waxay faizain hayaan maritza torres . [facility] [phone].    ATENCIÓN: Si habla español, tiene a reyes disposición servicios gratuitos de asistencia lingüística. Kristi al [phone].    We comply with applicable federal civil rights laws and [place] laws. We do not discriminate on the basis of race, color, national origin, age, disability, sex, sexual orientation, or gender identity.            Thank you!     Thank you for choosing [facility] PHYSICAL MEDICINE AND REHABILITATION  for your care. Our goal is always to provide you with excellent care. Hearing back from our patients is one way we can continue to improve our services. Please take a few minutes to complete the written survey that you may receive in the mail after your visit with us. Thank you!             Your Updated Medication List - Protect others around you: Learn how to safely use, store and throw away your medicines at www.disposemymeds.org.          This list is accurate as of 3/28/18 10:50 AM.  Always  use your most recent med list.                   Brand Name Dispense Instructions for use Diagnosis    baclofen 20 MG tablet    LIORESAL     Take 30 mg by mouth 4 times daily        ibuprofen 800 MG tablet    ADVIL/MOTRIN     Take 800 mg by mouth        klonoPIN 0.5 MG tablet   Generic drug:  clonazePAM      Take 0.5 mg by mouth 2 times daily        omeprazole 20 MG CR capsule    priLOSEC     Take 20 mg by mouth daily        oxybutynin 5 MG tablet    DITROPAN     Take by mouth 4 times daily        tiZANidine 2 MG tablet    ZANAFLEX    180 tablet    Take 1-2 tablets (2-4 mg) by mouth 3 times daily    Muscle spasticity       VIAGRA 100 MG tablet   Generic drug:  sildenafil      Take 100 mg by mouth as needed

## 2018-03-28 NOTE — PATIENT INSTRUCTIONS
"Active ingredients is what we care about. I don't care what brand but need to pay attention to the ingredients:  1. Docusate often referred to a \"stool softener\" usually comes in 100 mg tablet each. Trade names Colace.  2. Senna, sometimes referred to as \"natural laxitive\", usually comes in 8.6 mg per tablet each. Trade name Sennokot  3. Bisacodyl, often referred to as \"laxitive\", usually comes in 5 or 10 mg tablets. Trade name Ducolax. This one increases the wave action which if blocked up, may cause cramping.   4. Psyllium is a fiber supplement which generally adds bulk. Trade names Citrucel, Metamucil, Benefiber... You report in past this caused it to come out as sand.  5. Polyethylene glycol, also know as Miralax keeps the stool softer.    Many of the brand names may be confusing and have other ingredients. For example Ducolax may not be bisacodyl.    What and how much. Either one may be adjusted.    Consistency is the key. Similar pattern each day.      Spasticity:  Stop the clonazepam  Continue baclofen for now 30 mg 4 x per day (or could try 40 mg 3 times per day)  Start tizanidine 2 mg 3 times per day but may increase to 4 mg 3 times per day. If this is effective then great. If not, we could go up a bit higher dose. Max would be 8 mg 3 times per day. Call us if you need higher doses. Also OK to use different dose at different times a day. For example 2 mg AM, 2 mg mid day and 4 or 6 mg at bedtime. Or just take it at bedtime. You can play with the dose and timing to be sure.    "

## 2018-03-28 NOTE — PROGRESS NOTES
"Physical medicine rehabilitation clinic note:    Mr. Lopez is a 57-year-old with paraplegia spasticity neurogenic bowel and bladder.  Returns to rehab clinic having last seen me November 29.  Predominance of our discussion and prior visits have been around bowel management.  He has inconsistencies and a lot of frustrations.  He has had extensive follow-up by different providers.  Despite all of our extended conversations in the past, he comes today without clear knowledge of the specific medications he is on.  He describes them as the red orange or brown pill.  He knows some names like Dulcolax but is not certain if that is bisacodyl.  He does seem he had some more consistency for a while since her last visit in November.  Best guess is he was utilizing docusate 1 tablet daily and having bowel movements most days. He then reports this last weekend was awful with severe pain and needed to confine himself to bed and not eat for Friday and Saturday.  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  After feeling a bit backed up, he took 2 \"Dulcolax\" (I suspect but not confirming bisacodyl) after which he had the severe pain.  During her visit he called his significant other and she read some of the names on the packages.  The original boxes are missing.  There is \"Senokot extra\", Dulcolax, something for \"gas relief\". In the past with my visit originally we had tried psyllium but he felt that made it worse and the stools became \"sand\".    Spasticity is other prominent concern.  He has been on long-standing clonazepam with sounds like 0.5 mg once daily.  His current primary provider did give him a prescription but only until he can follow-up with me and I would need to prescribe it after that.  That is his initial goal but really his primary overriding goal is to come off of any medications but he is just wanting to have reasonable spasm " "management.  He does take baclofen 30 mg 4 times daily when she is done a long time.  He has not been on tizanidine dantrolene nor any injections per his recollection.  Spasms occur in both legs.  They can have some extensor synergy patterns but also flexion.    Assessment and recommendations:  1. For bowels we explored further the different types of medication and active ingredients.  We looked at pictures online for pills and descriptions.  In his after visit summary we took the time before he left detailing the different generic, common trade names and descriptions as well as typical doses.  As copy below what was given in his discharge instructions.  2. Emphasized he cannot give up on one pattern if he has short-term changes especially if they can be attributed to other issues.  For example taking \"Dulcolax\" after he is already considerably backed up may be associated with cramping but if he is doing things regularly, low-dose may not have that pain.    3. Emphasized importance of good hydration and consistency of bowel program.   4. Continue the digital stimulation augmentation.  5. For spasticity, I will not prescribe clonazepam.  I am okay prescribing some alternate spasticity management.  We will begin with tizanidine titrating dose to efficacy.  Will begin 2-4 mg 3 times daily.  Will also allow him to take different dose at different times especially for example if he has more spasms at night, higher dose could be in the evening.  6. Could subsequently entertain dantrolene.  Would want to check liver function tests before pursuing that.  7. He may benefit from botulinum toxin injections if other oral management options prove not sufficiently effective.  8. We will follow-up with Mr. Lopez in 6 months time.    70 minutes spent in direct patient interaction, greater than 80% of which in education and counseling as detailed above.      Copy of what he had in d/c instructions:  Active ingredients is what we care " "about. I don't care what brand but need to pay attention to the ingredients:  1. Docusate often referred to a \"stool softener\" usually comes in 100 mg tablet each. Trade names Colace.  2. Senna, sometimes referred to as \"natural laxitive\", usually comes in 8.6 mg per tablet each. Trade name Sennokot  3. Bisacodyl, often referred to as \"laxitive\", usually comes in 5 or 10 mg tablets. Trade name Ducolax. This one increases the wave action which if blocked up, may cause cramping.   4. Psyllium is a fiber supplement which generally adds bulk. Trade names Citrucel, Metamucil, Benefiber... You report in past this caused it to come out as sand.  5. Polyethylene glycol, also know as Miralax keeps the stool softer.    Many of the brand names may be confusing and have other ingredients. For example Ducolax may not be bisacodyl.    What and how much. Either one may be adjusted.    Consistency is the key. Similar pattern each day.      Spasticity:  Stop the clonazepam  Continue baclofen for now 30 mg 4 x per day (or could try 40 mg 3 times per day)  Start tizanidine 2 mg 3 times per day but may increase to 4 mg 3 times per day. If this is effective then great. If not, we could go up a bit higher dose. Max would be 8 mg 3 times per day. Call us if you need higher doses. Also OK to use different dose at different times a day. For example 2 mg AM, 2 mg mid day and 4 or 6 mg at bedtime. Or just take it at bedtime. You can play with the dose and timing to be sure.  "

## 2018-03-29 ENCOUNTER — TELEPHONE (OUTPATIENT)
Dept: PHYSICAL MEDICINE AND REHAB | Facility: CLINIC | Age: 58
End: 2018-03-29

## 2018-03-29 NOTE — TELEPHONE ENCOUNTER
Jose called clinic saying last night without clonazepam was awful.  Said he could not bend down to tie shoes and needed his roommates to help him.  He took the tizanidine 4 mg in the afternoon, 4 mg bedtime and 4 mg overnight without much benefit.  He is frustrated about the clonazepam being more abruptly discontinued.  I reiterated that his primary provider who is prescribing that will need to address ongoing prescription or taper if believes that is so indicated.  That is what I told Mr. Lopez yesterday.  It is reasonable that he increase the tizanidine initially to 6 mg or 8 mg TID if not too sleepy. (2-4 tablets).  If he finds this dose more effective and adequately tolerated, will need to re-prescribe at the higher dose as a first prescription would run out after about 2 weeks if he's at the max 8 mg TID.  I informed him he may take 8 mg just once or twice daily similar to his previous clonazepam timing of around 3 PM and bedtime.

## 2018-04-19 ENCOUNTER — APPOINTMENT (OUTPATIENT)
Dept: CT IMAGING | Facility: CLINIC | Age: 58
DRG: 871 | End: 2018-04-19
Attending: EMERGENCY MEDICINE
Payer: MEDICARE

## 2018-04-19 ENCOUNTER — HOSPITAL ENCOUNTER (EMERGENCY)
Facility: CLINIC | Age: 58
Discharge: HOME | DRG: 871 | End: 2018-04-19
Attending: EMERGENCY MEDICINE | Admitting: EMERGENCY MEDICINE
Payer: MEDICARE

## 2018-04-19 VITALS
HEART RATE: 72 BPM | HEIGHT: 70 IN | SYSTOLIC BLOOD PRESSURE: 142 MMHG | WEIGHT: 195 LBS | TEMPERATURE: 97.5 F | DIASTOLIC BLOOD PRESSURE: 80 MMHG | OXYGEN SATURATION: 98 % | BODY MASS INDEX: 27.92 KG/M2 | RESPIRATION RATE: 16 BRPM

## 2018-04-19 DIAGNOSIS — R30.0 DYSURIA: ICD-10-CM

## 2018-04-19 DIAGNOSIS — N31.9 NEUROGENIC BLADDER: ICD-10-CM

## 2018-04-19 DIAGNOSIS — R10.84 ABDOMINAL PAIN, GENERALIZED: ICD-10-CM

## 2018-04-19 LAB
ALBUMIN SERPL-MCNC: 3.6 G/DL (ref 3.4–5)
ALBUMIN UR-MCNC: 30 MG/DL
ALP SERPL-CCNC: 104 U/L (ref 40–150)
ALT SERPL W P-5'-P-CCNC: 34 U/L (ref 0–70)
ANION GAP SERPL CALCULATED.3IONS-SCNC: 7 MMOL/L (ref 3–14)
APPEARANCE UR: ABNORMAL
AST SERPL W P-5'-P-CCNC: 27 U/L (ref 0–45)
BASOPHILS # BLD AUTO: 0 10E9/L (ref 0–0.2)
BASOPHILS NFR BLD AUTO: 0.3 %
BILIRUB SERPL-MCNC: 0.7 MG/DL (ref 0.2–1.3)
BILIRUB UR QL STRIP: NEGATIVE
BUN SERPL-MCNC: 23 MG/DL (ref 7–30)
CALCIUM SERPL-MCNC: 9.6 MG/DL (ref 8.5–10.1)
CHLORIDE SERPL-SCNC: 98 MMOL/L (ref 94–109)
CO2 SERPL-SCNC: 30 MMOL/L (ref 20–32)
COLOR UR AUTO: YELLOW
CREAT SERPL-MCNC: 0.6 MG/DL (ref 0.66–1.25)
DIFFERENTIAL METHOD BLD: NORMAL
EOSINOPHIL # BLD AUTO: 0.2 10E9/L (ref 0–0.7)
EOSINOPHIL NFR BLD AUTO: 1.9 %
ERYTHROCYTE [DISTWIDTH] IN BLOOD BY AUTOMATED COUNT: 12.9 % (ref 10–15)
GFR SERPL CREATININE-BSD FRML MDRD: >90 ML/MIN/1.7M2
GLUCOSE SERPL-MCNC: 93 MG/DL (ref 70–99)
GLUCOSE UR STRIP-MCNC: NEGATIVE MG/DL
HCT VFR BLD AUTO: 44.5 % (ref 40–53)
HGB BLD-MCNC: 15.1 G/DL (ref 13.3–17.7)
HGB UR QL STRIP: NEGATIVE
IMM GRANULOCYTES # BLD: 0 10E9/L (ref 0–0.4)
IMM GRANULOCYTES NFR BLD: 0.2 %
KETONES UR STRIP-MCNC: 80 MG/DL
LEUKOCYTE ESTERASE UR QL STRIP: ABNORMAL
LIPASE SERPL-CCNC: 127 U/L (ref 73–393)
LYMPHOCYTES # BLD AUTO: 1.8 10E9/L (ref 0.8–5.3)
LYMPHOCYTES NFR BLD AUTO: 21.1 %
MCH RBC QN AUTO: 27.6 PG (ref 26.5–33)
MCHC RBC AUTO-ENTMCNC: 33.9 G/DL (ref 31.5–36.5)
MCV RBC AUTO: 81 FL (ref 78–100)
MONOCYTES # BLD AUTO: 1.2 10E9/L (ref 0–1.3)
MONOCYTES NFR BLD AUTO: 13.8 %
MUCOUS THREADS #/AREA URNS LPF: PRESENT /LPF
NEUTROPHILS # BLD AUTO: 5.4 10E9/L (ref 1.6–8.3)
NEUTROPHILS NFR BLD AUTO: 62.7 %
NITRATE UR QL: NEGATIVE
NRBC # BLD AUTO: 0 10*3/UL
NRBC BLD AUTO-RTO: 0 /100
PH UR STRIP: 8 PH (ref 5–7)
PLATELET # BLD AUTO: 283 10E9/L (ref 150–450)
POTASSIUM SERPL-SCNC: 3.6 MMOL/L (ref 3.4–5.3)
PROT SERPL-MCNC: 7.9 G/DL (ref 6.8–8.8)
RBC # BLD AUTO: 5.47 10E12/L (ref 4.4–5.9)
RBC #/AREA URNS AUTO: 0 /HPF (ref 0–2)
SODIUM SERPL-SCNC: 135 MMOL/L (ref 133–144)
SOURCE: ABNORMAL
SP GR UR STRIP: 1.02 (ref 1–1.03)
UROBILINOGEN UR STRIP-MCNC: 4 MG/DL (ref 0–2)
WBC # BLD AUTO: 8.6 10E9/L (ref 4–11)
WBC #/AREA URNS AUTO: 5 /HPF (ref 0–5)
YEAST #/AREA URNS HPF: ABNORMAL /HPF

## 2018-04-19 PROCEDURE — 25000128 H RX IP 250 OP 636: Performed by: EMERGENCY MEDICINE

## 2018-04-19 PROCEDURE — 25000125 ZZHC RX 250: Performed by: EMERGENCY MEDICINE

## 2018-04-19 PROCEDURE — 80053 COMPREHEN METABOLIC PANEL: CPT | Performed by: EMERGENCY MEDICINE

## 2018-04-19 PROCEDURE — 87186 SC STD MICRODIL/AGAR DIL: CPT | Performed by: EMERGENCY MEDICINE

## 2018-04-19 PROCEDURE — 25000132 ZZH RX MED GY IP 250 OP 250 PS 637: Mod: GY | Performed by: EMERGENCY MEDICINE

## 2018-04-19 PROCEDURE — A9270 NON-COVERED ITEM OR SERVICE: HCPCS | Mod: GY | Performed by: EMERGENCY MEDICINE

## 2018-04-19 PROCEDURE — 87086 URINE CULTURE/COLONY COUNT: CPT | Performed by: EMERGENCY MEDICINE

## 2018-04-19 PROCEDURE — 81001 URINALYSIS AUTO W/SCOPE: CPT | Performed by: EMERGENCY MEDICINE

## 2018-04-19 PROCEDURE — 99285 EMERGENCY DEPT VISIT HI MDM: CPT | Mod: 25

## 2018-04-19 PROCEDURE — 87088 URINE BACTERIA CULTURE: CPT | Performed by: EMERGENCY MEDICINE

## 2018-04-19 PROCEDURE — 96365 THER/PROPH/DIAG IV INF INIT: CPT | Mod: 59

## 2018-04-19 PROCEDURE — 83690 ASSAY OF LIPASE: CPT | Performed by: EMERGENCY MEDICINE

## 2018-04-19 PROCEDURE — 85025 COMPLETE CBC W/AUTO DIFF WBC: CPT | Performed by: EMERGENCY MEDICINE

## 2018-04-19 PROCEDURE — 74177 CT ABD & PELVIS W/CONTRAST: CPT

## 2018-04-19 RX ORDER — CEPHALEXIN 500 MG/1
500 CAPSULE ORAL 4 TIMES DAILY
Qty: 28 CAPSULE | Refills: 0 | Status: SHIPPED | OUTPATIENT
Start: 2018-04-19 | End: 2018-04-21 | Stop reason: ALTCHOICE

## 2018-04-19 RX ORDER — CLONAZEPAM 0.5 MG/1
0.5 TABLET ORAL ONCE
Status: COMPLETED | OUTPATIENT
Start: 2018-04-19 | End: 2018-04-19

## 2018-04-19 RX ORDER — IOPAMIDOL 755 MG/ML
98 INJECTION, SOLUTION INTRAVASCULAR ONCE
Status: COMPLETED | OUTPATIENT
Start: 2018-04-19 | End: 2018-04-19

## 2018-04-19 RX ORDER — CEFTRIAXONE 1 G/1
1 INJECTION, POWDER, FOR SOLUTION INTRAMUSCULAR; INTRAVENOUS ONCE
Status: COMPLETED | OUTPATIENT
Start: 2018-04-19 | End: 2018-04-19

## 2018-04-19 RX ADMIN — BACLOFEN 30 MG: 20 TABLET ORAL at 16:37

## 2018-04-19 RX ADMIN — IOPAMIDOL 98 ML: 755 INJECTION, SOLUTION INTRAVENOUS at 17:30

## 2018-04-19 RX ADMIN — CEFTRIAXONE 1 G: 1 INJECTION, POWDER, FOR SOLUTION INTRAMUSCULAR; INTRAVENOUS at 18:28

## 2018-04-19 RX ADMIN — CLONAZEPAM 0.5 MG: 0.5 TABLET ORAL at 16:37

## 2018-04-19 RX ADMIN — SODIUM CHLORIDE, PRESERVATIVE FREE 70 ML: 5 INJECTION INTRAVENOUS at 17:30

## 2018-04-19 ASSESSMENT — ENCOUNTER SYMPTOMS: ABDOMINAL PAIN: 1

## 2018-04-19 NOTE — ED AVS SNAPSHOT
Emergency Department    [address] [facility] [address]    Phone:  [phone]    Fax:  [phone]                                       Jose Lopez   MRN: [id number]    Department:   Emergency Department   Date of Visit:  4/19/2018           Patient Information     Date Of Birth          1960        Your diagnoses for this visit were:     Abdominal pain, generalized     Neurogenic bladder     Dysuria        You were seen by Brian Cain MD.      Follow-up Information     Follow up with [facility].    Why:  tomorrow or next week    Contact information:    [address]  [facility] [address]  [phone]          Follow up with  Emergency Department.    Specialty:  EMERGENCY MEDICINE    Why:  As needed, If symptoms worsen    Contact information:    [address] [facility] [id number] [phone]        Discharge Instructions           Understanding Urinary Tract Infections (UTIs)  Most UTIs are caused by bacteria, although they may also be caused by viruses or fungi. Bacteria from the bowel are the most common source of infection. The infection may start because of any of the following:    Sexual activity. During sex, bacteria can travel from the penis, vagina, or rectum into the urethra.     Bacteria on the skin outside the rectum may travel into the urethra. This is more common in women since the rectum and urethra are closer to each other than in men. Wiping from front to back after using the toilet and keeping the area clean can help prevent germs from getting to the urethra.    Blockage of urine flow through the urinary tract. If urine sits too long, germs may start to grow out of control.      Parts of the urinary tract  The infection can occur in any part of the urinary tract.    The kidneys collect and store urine.    The ureters carry urine from the kidneys to the bladder.    The bladder holds urine until you are ready  to let it out.    The urethra carries urine from the bladder out of the body. It is shorter in women, so bacteria can move through it more easily. The urethra is longer in men, so a UTI is less likely to reach the bladder or kidneys in men.  Date Last Reviewed: 1/1/2017 2000-2017 [employer]. [address] [place] [address], [place] [address]. All rights reserved. This information is not intended as a substitute for professional medical care. Always follow your healthcare professional's instructions.      Discharge Instructions  Abdominal Pain    Abdominal pain can be caused by many things. Your evaluation today does not show the exact cause for your pain. Your doctor today has decided that it is unlikely your pain is due to a life threatening problem, or a problem requiring surgery or hospital admission. Sometimes those problems cannot be found right away, so it is very important that you follow up as directed.  Sometimes only the changes which occur over time allow the cause of your pain to be found.    Return to the Emergency Department for a recheck in 8-12 hours if your pain continues.  If your pain gets worse, changes in location, or feels different, return to the Emergency Department right away.    ADULTS:  Return to the Emergency Department right away if:      You get an oral temperature above 102oF or as directed by your doctor.    You have blood in your stools (bright red or black, tarry stools).    You keep throwing up or can t drink liquids.    You see blood when you throw up.    You can t have a bowel movement or you can t pass gas.    Your stomach gets bloated or bigger.    Your skin or the whites of your eyes look yellow.    You faint.    You have bloody, frequent or painful urination.    You have new symptoms or anything that worries you.    CHILDREN:  Return to the Emergency Department right away if your child has any of the above-listed symptoms or the following:      Pushes your hand away  or screams/cries when his/her belly is touched.    You notice your child is very fussy or weak.    Your child is very tired and is too tired to eat or drink.    Your child is dehydrated.  Signs of dehydration can be:  o Your infant has had no wet diapers in 4-5 hours.  o Your older child has not passed urine in 6-8 hours.  o Your infant or child starts to have dry mouth and lips, or no saliva or tears.    PREGNANT WOMEN:  Return to the Emergency Department right away if you have any of the above-listed symptoms or the following:      You have bleeding, leaking fluid or passing tissue from the vagina.    You have worse pain or cramping, or pain in your shoulder or back.    You have vomiting that will not stop.    You have painful or bloody urination.    You have a temperature of 100oF or more.    Your baby is not moving as much as usual.    You faint.    You get a bad headache with or without eye problems and abdominal pain.    You have a convulsion or seizure.    You have unusual discharge from your vagina and abdominal pain.    Abdominal pain is pretty common during pregnancy.  Your pain may or may not be related to your pregnancy. You should follow-up closely with your OB doctor so they can evaluate you and your baby.  Until you follow-up with your regular doctor, do the following:       Avoid sex and do not put anything in your vagina.    Drink clear fluids.    Only take medications approved by your doctor.    MORE INFORMATION:    Appendicitis:  A possible cause of abdominal pain in any person who still has their appendix is acute appendicitis. Appendicitis is often hard to diagnose.  Testing does not always rule out early appendicitis or other causes of abdominal pain. Close follow-up with your doctor and re-evaluations may be needed to figure out the reason for your abdominal pain.    Follow-up:  It is very important that you make an appointment with your clinic and go to the appointment.  If you do not  "follow-up with your primary doctor, it may result in missing an important development which could result in permanent injury or disability and/or lasting pain.  If there is any problem keeping your appointment, call your doctor or return to the Emergency Department.    Medications:  Take your medications as directed by your doctor today.  Before using over-the-counter medications, ask your doctor and make sure to take the medications as directed.  If you have any questions about medications, ask your doctor.    Diet:  Resume your normal diet as much as possible, but do not eat fried, fatty or spicy foods while you have pain.  Do not drink alcohol or have caffeine.  Do not smoke tobacco.    Probiotics: If you have been given an antibiotic, you may want to also take a probiotic pill or eat yogurt with live cultures. Probiotics have \"good bacteria\" to help your intestines stay healthy. Studies have shown that probiotics help prevent diarrhea and other intestine problems (including C. diff infection) when you take antibiotics. You can buy these without a prescription in the pharmacy section of the store.     If you were given a prescription for medicine here today, be sure to read all of the information (including the package insert) that comes with your prescription.  This will include important information about the medicine, its side effects, and any warnings that you need to know about.  The pharmacist who fills the prescription can provide more information and answer questions you may have about the medicine.  If you have questions or concerns that the pharmacist cannot address, please call or return to the Emergency Department.         Opioid Medication Information    Pain medications are among the most commonly prescribed medicines, so we are including this information for all our patients. If you did not receive pain medication or get a prescription for pain medicine, you can ignore it.     You may have been " given a prescription for an opioid (narcotic) pain medicine and/or have received a pain medicine while here in the Emergency Department. These medicines can make you drowsy or impaired. You must not drive, operate dangerous equipment, or engage in any other dangerous activities while taking these medications. If you drive while taking these medications, you could be arrested for DUI, or driving under the influence. Do not drink any alcohol while you are taking these medications.     Opioid pain medications can cause addiction. If you have a history of chemical dependency of any type, you are at a higher risk of becoming addicted to pain medications.  Only take these prescribed medications to treat your pain when all other options have been tried. Take it for as short a time and as few doses as possible. Store your pain pills in a secure place, as they are frequently stolen and provide a dangerous opportunity for children or visitors in your house to start abusing these powerful medications. We will not replace any lost or stolen medicine.  As soon as your pain is better, you should flush all your remaining medication.     Many prescription pain medications contain Tylenol  (acetaminophen), including Vicodin , Tylenol #3 , Norco , Lortab , and Percocet .  You should not take any extra pills of Tylenol  if you are using these prescription medications or you can get very sick.  Do not ever take more than 3000 mg of acetaminophen in any 24 hour period.    All opioids tend to cause constipation. Drink plenty of water and eat foods that have a lot of fiber, such as fruits, vegetables, prune juice, apple juice and high fiber cereal.  Take a laxative if you don t move your bowels at least every other day. Miralax , Milk of Magnesia, Colace , or Senna  can be used to keep you regular.      Remember that you can always come back to the Emergency Department if you are not able to see your regular doctor in the amount of time  listed above, if you get any new symptoms, or if there is anything that worries you.          Your next 10 appointments already scheduled     Oct 03, 2018  9:40 AM CDT   (Arrive by 9:25 AM)   Return Visit with Jose Belle MD   [facility] ([facility])    [address]  [address] [facility] [address] [phone]              24 Hour Appointment Hotline       To make an appointment at any [facility], call [phone] ([phone]). If you don't have a family doctor or clinic, we will help you find one. [facility] clinics are conveniently located to serve the needs of you and your family.             Review of your medicines      START taking        Dose / Directions Last dose taken    cephALEXin 500 MG capsule   Commonly known as:  KEFLEX   Dose:  500 mg   Quantity:  28 capsule        Take 1 capsule (500 mg) by mouth 4 times daily for 7 days   Refills:  0          Our records show that you are taking the medicines listed below. If these are incorrect, please call your family doctor or clinic.        Dose / Directions Last dose taken    baclofen 20 MG tablet   Commonly known as:  LIORESAL   Dose:  30 mg        Take 30 mg by mouth 4 times daily   Refills:  0        ibuprofen 800 MG tablet   Commonly known as:  ADVIL/MOTRIN   Dose:  800 mg        Take 800 mg by mouth   Refills:  0        klonoPIN 0.5 MG tablet   Dose:  0.5 mg   Generic drug:  clonazePAM        Take 0.5 mg by mouth 2 times daily   Refills:  0        omeprazole 20 MG CR capsule   Commonly known as:  priLOSEC   Dose:  20 mg        Take 20 mg by mouth daily   Refills:  3        oxybutynin 5 MG tablet   Commonly known as:  DITROPAN        Take by mouth 4 times daily   Refills:  0        tiZANidine 2 MG tablet   Commonly known as:  ZANAFLEX   Dose:  2-4 mg   Quantity:  180 tablet        Take 1-2 tablets (2-4 mg) by mouth 3 times daily   Refills:  6        VIAGRA 100 MG  tablet   Dose:  100 mg   Generic drug:  sildenafil        Take 100 mg by mouth as needed   Refills:  3                Prescriptions were sent or printed at these locations (1 Prescription)                   Other Prescriptions                Printed at Department/Unit printer (1 of 1)         cephALEXin (KEFLEX) 500 MG capsule                Procedures and tests performed during your visit     CBC with platelets differential    CT Abdomen Pelvis w Contrast    Comprehensive metabolic panel    Give 20 ounces of water 15 minutes before CT of abdomen    Lipase    UA with Microscopic    Urine Culture Aerobic Bacterial      Orders Needing Specimen Collection     None      Pending Results     Date and Time Order Name Status Description    4/19/2018 1813 Urine Culture Aerobic Bacterial In process             Pending Culture Results     Date and Time Order Name Status Description    4/19/2018 1813 Urine Culture Aerobic Bacterial In process             Pending Results Instructions     If you had any lab results that were not finalized at the time of your Discharge, you can call the ED Lab Result RN at [phone]. You will be contacted by this team for any positive Lab results or changes in treatment. The nurses are available 7 days a week from 10A to 6:30P.  You can leave a message 24 hours per day and they will return your call.        Test Results From Your Hospital Stay        4/19/2018  4:17 PM      Component Results     Component Value Ref Range & Units Status    WBC 8.6 4.0 - 11.0 10e9/L Final    RBC Count 5.47 4.4 - 5.9 10e12/L Final    Hemoglobin 15.1 13.3 - 17.7 g/dL Final    Hematocrit 44.5 40.0 - 53.0 % Final    MCV 81 78 - 100 fl Final    MCH 27.6 26.5 - 33.0 pg Final    MCHC 33.9 31.5 - 36.5 g/dL Final    RDW 12.9 10.0 - 15.0 % Final    Platelet Count 283 150 - 450 10e9/L Final    Diff Method Automated Method  Final    % Neutrophils 62.7 % Final    % Lymphocytes 21.1 % Final    % Monocytes 13.8 % Final    %  Eosinophils 1.9 % Final    % Basophils 0.3 % Final    % Immature Granulocytes 0.2 % Final    Nucleated RBCs 0 0 /100 Final    Absolute Neutrophil 5.4 1.6 - 8.3 10e9/L Final    Absolute Lymphocytes 1.8 0.8 - 5.3 10e9/L Final    Absolute Monocytes 1.2 0.0 - 1.3 10e9/L Final    Absolute Eosinophils 0.2 0.0 - 0.7 10e9/L Final    Absolute Basophils 0.0 0.0 - 0.2 10e9/L Final    Abs Immature Granulocytes 0.0 0 - 0.4 10e9/L Final    Absolute Nucleated RBC 0.0  Final         4/19/2018  4:38 PM      Component Results     Component Value Ref Range & Units Status    Sodium 135 133 - 144 mmol/L Final    Potassium 3.6 3.4 - 5.3 mmol/L Final    Chloride 98 94 - 109 mmol/L Final    Carbon Dioxide 30 20 - 32 mmol/L Final    Anion Gap 7 3 - 14 mmol/L Final    Glucose 93 70 - 99 mg/dL Final    Urea Nitrogen 23 7 - 30 mg/dL Final    Creatinine 0.60 (L) 0.66 - 1.25 mg/dL Final    GFR Estimate >90 >60 mL/min/1.7m2 Final    Non  GFR Calc    GFR Estimate If Black >90 >60 mL/min/1.7m2 Final    African American GFR Calc    Calcium 9.6 8.5 - 10.1 mg/dL Final    Bilirubin Total 0.7 0.2 - 1.3 mg/dL Final    Albumin 3.6 3.4 - 5.0 g/dL Final    Protein Total 7.9 6.8 - 8.8 g/dL Final    Alkaline Phosphatase 104 40 - 150 U/L Final    ALT 34 0 - 70 U/L Final    AST 27 0 - 45 U/L Final         4/19/2018  4:38 PM      Component Results     Component Value Ref Range & Units Status    Lipase 127 73 - 393 U/L Final         4/19/2018  6:25 PM      Component Results     Component Value Ref Range & Units Status    Color Urine Yellow  Final    Appearance Urine Cloudy  Final    Glucose Urine Negative NEG^Negative mg/dL Final    Bilirubin Urine Negative NEG^Negative Final    Ketones Urine 80 (A) NEG^Negative mg/dL Final    Specific Gravity Urine 1.019 1.003 - 1.035 Final    Blood Urine Negative NEG^Negative Final    pH Urine 8.0 (H) 5.0 - 7.0 pH Final    Protein Albumin Urine 30 (A) NEG^Negative mg/dL Final    Urobilinogen mg/dL 4.0 (H) 0.0 -  2.0 mg/dL Final    Nitrite Urine Negative NEG^Negative Final    Leukocyte Esterase Urine Small (A) NEG^Negative Final    Source Catheterized Urine  Final    WBC Urine 5 0 - 5 /HPF Final    RBC Urine 0 0 - 2 /HPF Final    Yeast Urine Many (A) NEG^Negative /HPF Final    Mucous Urine Present (A) NEG^Negative /LPF Final         4/19/2018  6:03 PM      Narrative     CT ABDOMEN AND PELVIS WITH CONTRAST 4/19/2018 5:30 PM     HISTORY: Generalized abdominal pain.       TECHNIQUE: Axial images from the lung bases to the symphysis are  performed with additional coronal reformatted images. 98 mL of Isovue  370 are given intravenously. Radiation dose for this scan was reduced  using automated exposure control, adjustment of the mA and/or kV  according to patient size, or iterative reconstruction technique.    FINDINGS: Calcified granulomas noted in the lateral left lower lobe on  series 2, image 5. Lung bases are otherwise clear.    Abdomen: Cholecystectomy changes. The liver, spleen, pancreas, adrenal  glands and kidneys are unremarkable. Small 1 cm cyst is noted in the  posterior mid left kidney of doubtful clinical significance. No  hydronephrosis or evidence of urinary tract calculi. Spinal fusion  hardware in the thoracic region causes localized artifact. The bowel  is normal in caliber without obstruction or diverticulitis. Appendix  is normal.    Pelvis: Bladder is distended with wall thickening circumferentially  without obvious focal mass. Findings could be related to chronic  bladder outlet obstruction or UTI. Prostate gland does not appear  enlarged. Rectum is unremarkable. No enlarged pelvic lymph nodes or  free fluid. Bone window examination demonstrates degenerative spine  changes.        Impression     IMPRESSION:  1. Bladder distention with circumferential wall thickening, but no  focal nodularity or mass. Follow-up as clinically warranted. UTI or  chronic bladder outlet obstruction are possible. No prostate  gland  enlargement.  2. No acute changes are otherwise evident in the abdomen or pelvis to  account for patient's symptoms.  3. Cholecystectomy changes and thoracic spinal fusion hardware are  noted.  4. Simple cyst left kidney. This is of doubtful clinical significance  and no further follow-up required.    DINORA DYER MD         4/19/2018  6:16 PM                Clinical Quality Measure: Blood Pressure Screening     Your blood pressure was checked while you were in the emergency department today. The last reading we obtained was  BP: 142/80 . Please read the guidelines below about what these numbers mean and what you should do about them.  If your systolic blood pressure (the top number) is less than 120 and your diastolic blood pressure (the bottom number) is less than 80, then your blood pressure is normal. There is nothing more that you need to do about it.  If your systolic blood pressure (the top number) is 120-139 or your diastolic blood pressure (the bottom number) is 80-89, your blood pressure may be higher than it should be. You should have your blood pressure rechecked within a year by a primary care provider.  If your systolic blood pressure (the top number) is 140 or greater or your diastolic blood pressure (the bottom number) is 90 or greater, you may have high blood pressure. High blood pressure is treatable, but if left untreated over time it can put you at risk for heart attack, stroke, or kidney failure. You should have your blood pressure rechecked by a primary care provider within the next 4 weeks.  If your provider in the emergency department today gave you specific instructions to follow-up with your doctor or provider even sooner than that, you should follow that instruction and not wait for up to 4 weeks for your follow-up visit.        Thank you for choosing [facility]       Thank you for choosing [facility] for your care. Our goal is always to provide you with excellent care. Hearing back from  "our patients is one way we can continue to improve our services. Please take a few minutes to complete the written survey that you may receive in the mail after you visit with us. Thank you!        [facility] Information     [employer] lets you send messages to your doctor, view your test results, renew your prescriptions, schedule appointments and more. To sign up, go to [email or web address] . Click on \"Log in\" on the left side of the screen, which will take you to the Welcome page. Then click on \"Sign up Now\" on the right side of the page.     You will be asked to enter the access code listed below, as well as some personal information. Please follow the directions to create your username and password.     Your access code is: [email or web address]  Expires: 2018  6:30 AM     Your access code will  in 90 days. If you need help or a new code, please call your [facility] clinic or [phone].        Care EveryWhere ID     This is your Care EveryWhere ID. This could be used by other organizations to access your [facility] medical records  [id number]        Equal Access to Services     JAYLEN DOUGLASS : Haddevin Gill, tracy norton, miky weinberg, forrest torres . So [facility] [phone].    ATENCIÓN: Si habla español, tiene a reyes disposición servicios gratuitos de asistencia lingüística. Kristi al [phone].    We comply with applicable federal civil rights laws and [place] laws. We do not discriminate on the basis of race, color, national origin, age, disability, sex, sexual orientation, or gender identity.            After Visit Summary       This is your record. Keep this with you and show to your community pharmacist(s) and doctor(s) at your next visit.                  "

## 2018-04-19 NOTE — ED AVS SNAPSHOT
Emergency Department    [address] [facility] [address]    Phone:  [phone]    Fax:  [phone]                                       Jose Lopez   MRN: [id number]    Department:   Emergency Department   Date of Visit:  4/19/2018           After Visit Summary Signature Page     I have received my discharge instructions, and my questions have been answered. I have discussed any challenges I see with this plan with the nurse or doctor.    ..........................................................................................................................................  Patient/Patient Representative Signature      ..........................................................................................................................................  Patient Representative Print Name and Relationship to Patient    ..................................................               ................................................  Date                                            Time    ..........................................................................................................................................  Reviewed by Signature/Title    ...................................................              ..............................................  Date                                                            Time

## 2018-04-19 NOTE — DISCHARGE INSTRUCTIONS
Understanding Urinary Tract Infections (UTIs)  Most UTIs are caused by bacteria, although they may also be caused by viruses or fungi. Bacteria from the bowel are the most common source of infection. The infection may start because of any of the following:    Sexual activity. During sex, bacteria can travel from the penis, vagina, or rectum into the urethra.     Bacteria on the skin outside the rectum may travel into the urethra. This is more common in women since the rectum and urethra are closer to each other than in men. Wiping from front to back after using the toilet and keeping the area clean can help prevent germs from getting to the urethra.    Blockage of urine flow through the urinary tract. If urine sits too long, germs may start to grow out of control.      Parts of the urinary tract  The infection can occur in any part of the urinary tract.    The kidneys collect and store urine.    The ureters carry urine from the kidneys to the bladder.    The bladder holds urine until you are ready to let it out.    The urethra carries urine from the bladder out of the body. It is shorter in women, so bacteria can move through it more easily. The urethra is longer in men, so a UTI is less likely to reach the bladder or kidneys in men.  Date Last Reviewed: 1/1/2017 2000-2017 [employer]. [address] [place] [address], [place] [address]. All rights reserved. This information is not intended as a substitute for professional medical care. Always follow your healthcare professional's instructions.      Discharge Instructions  Abdominal Pain    Abdominal pain can be caused by many things. Your evaluation today does not show the exact cause for your pain. Your doctor today has decided that it is unlikely your pain is due to a life threatening problem, or a problem requiring surgery or hospital admission. Sometimes those problems cannot be found right away, so it is very important that you follow up as  directed.  Sometimes only the changes which occur over time allow the cause of your pain to be found.    Return to the Emergency Department for a recheck in 8-12 hours if your pain continues.  If your pain gets worse, changes in location, or feels different, return to the Emergency Department right away.    ADULTS:  Return to the Emergency Department right away if:      You get an oral temperature above 102oF or as directed by your doctor.    You have blood in your stools (bright red or black, tarry stools).    You keep throwing up or can t drink liquids.    You see blood when you throw up.    You can t have a bowel movement or you can t pass gas.    Your stomach gets bloated or bigger.    Your skin or the whites of your eyes look yellow.    You faint.    You have bloody, frequent or painful urination.    You have new symptoms or anything that worries you.    CHILDREN:  Return to the Emergency Department right away if your child has any of the above-listed symptoms or the following:      Pushes your hand away or screams/cries when his/her belly is touched.    You notice your child is very fussy or weak.    Your child is very tired and is too tired to eat or drink.    Your child is dehydrated.  Signs of dehydration can be:  o Your infant has had no wet diapers in 4-5 hours.  o Your older child has not passed urine in 6-8 hours.  o Your infant or child starts to have dry mouth and lips, or no saliva or tears.    PREGNANT WOMEN:  Return to the Emergency Department right away if you have any of the above-listed symptoms or the following:      You have bleeding, leaking fluid or passing tissue from the vagina.    You have worse pain or cramping, or pain in your shoulder or back.    You have vomiting that will not stop.    You have painful or bloody urination.    You have a temperature of 100oF or more.    Your baby is not moving as much as usual.    You faint.    You get a bad headache with or without eye problems and  "abdominal pain.    You have a convulsion or seizure.    You have unusual discharge from your vagina and abdominal pain.    Abdominal pain is pretty common during pregnancy.  Your pain may or may not be related to your pregnancy. You should follow-up closely with your OB doctor so they can evaluate you and your baby.  Until you follow-up with your regular doctor, do the following:       Avoid sex and do not put anything in your vagina.    Drink clear fluids.    Only take medications approved by your doctor.    MORE INFORMATION:    Appendicitis:  A possible cause of abdominal pain in any person who still has their appendix is acute appendicitis. Appendicitis is often hard to diagnose.  Testing does not always rule out early appendicitis or other causes of abdominal pain. Close follow-up with your doctor and re-evaluations may be needed to figure out the reason for your abdominal pain.    Follow-up:  It is very important that you make an appointment with your clinic and go to the appointment.  If you do not follow-up with your primary doctor, it may result in missing an important development which could result in permanent injury or disability and/or lasting pain.  If there is any problem keeping your appointment, call your doctor or return to the Emergency Department.    Medications:  Take your medications as directed by your doctor today.  Before using over-the-counter medications, ask your doctor and make sure to take the medications as directed.  If you have any questions about medications, ask your doctor.    Diet:  Resume your normal diet as much as possible, but do not eat fried, fatty or spicy foods while you have pain.  Do not drink alcohol or have caffeine.  Do not smoke tobacco.    Probiotics: If you have been given an antibiotic, you may want to also take a probiotic pill or eat yogurt with live cultures. Probiotics have \"good bacteria\" to help your intestines stay healthy. Studies have shown that " probiotics help prevent diarrhea and other intestine problems (including C. diff infection) when you take antibiotics. You can buy these without a prescription in the pharmacy section of the store.     If you were given a prescription for medicine here today, be sure to read all of the information (including the package insert) that comes with your prescription.  This will include important information about the medicine, its side effects, and any warnings that you need to know about.  The pharmacist who fills the prescription can provide more information and answer questions you may have about the medicine.  If you have questions or concerns that the pharmacist cannot address, please call or return to the Emergency Department.         Opioid Medication Information    Pain medications are among the most commonly prescribed medicines, so we are including this information for all our patients. If you did not receive pain medication or get a prescription for pain medicine, you can ignore it.     You may have been given a prescription for an opioid (narcotic) pain medicine and/or have received a pain medicine while here in the Emergency Department. These medicines can make you drowsy or impaired. You must not drive, operate dangerous equipment, or engage in any other dangerous activities while taking these medications. If you drive while taking these medications, you could be arrested for DUI, or driving under the influence. Do not drink any alcohol while you are taking these medications.     Opioid pain medications can cause addiction. If you have a history of chemical dependency of any type, you are at a higher risk of becoming addicted to pain medications.  Only take these prescribed medications to treat your pain when all other options have been tried. Take it for as short a time and as few doses as possible. Store your pain pills in a secure place, as they are frequently stolen and provide a dangerous opportunity  for children or visitors in your house to start abusing these powerful medications. We will not replace any lost or stolen medicine.  As soon as your pain is better, you should flush all your remaining medication.     Many prescription pain medications contain Tylenol  (acetaminophen), including Vicodin , Tylenol #3 , Norco , Lortab , and Percocet .  You should not take any extra pills of Tylenol  if you are using these prescription medications or you can get very sick.  Do not ever take more than 3000 mg of acetaminophen in any 24 hour period.    All opioids tend to cause constipation. Drink plenty of water and eat foods that have a lot of fiber, such as fruits, vegetables, prune juice, apple juice and high fiber cereal.  Take a laxative if you don t move your bowels at least every other day. Miralax , Milk of Magnesia, Colace , or Senna  can be used to keep you regular.      Remember that you can always come back to the Emergency Department if you are not able to see your regular doctor in the amount of time listed above, if you get any new symptoms, or if there is anything that worries you.
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
## 2018-04-21 ENCOUNTER — TELEPHONE (OUTPATIENT)
Dept: EMERGENCY MEDICINE | Facility: CLINIC | Age: 58
End: 2018-04-21

## 2018-04-21 DIAGNOSIS — N39.0 URINARY TRACT INFECTION: ICD-10-CM

## 2018-04-21 LAB
BACTERIA SPEC CULT: ABNORMAL
Lab: ABNORMAL
SPECIMEN SOURCE: ABNORMAL
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
## 2018-04-22 ENCOUNTER — HOSPITAL ENCOUNTER (INPATIENT)
Facility: CLINIC | Age: 58
LOS: 1 days | Discharge: HOME | DRG: 871 | End: 2018-04-23
Attending: EMERGENCY MEDICINE | Admitting: INTERNAL MEDICINE
Payer: MEDICARE

## 2018-04-22 ENCOUNTER — APPOINTMENT (OUTPATIENT)
Dept: GENERAL RADIOLOGY | Facility: CLINIC | Age: 58
DRG: 871 | End: 2018-04-22
Attending: EMERGENCY MEDICINE
Payer: MEDICARE

## 2018-04-22 DIAGNOSIS — R40.4 TRANSIENT ALTERATION OF AWARENESS: ICD-10-CM

## 2018-04-22 DIAGNOSIS — N39.0 SEPSIS DUE TO URINARY TRACT INFECTION (H): Primary | ICD-10-CM

## 2018-04-22 DIAGNOSIS — A41.9 SEPSIS DUE TO URINARY TRACT INFECTION (H): Primary | ICD-10-CM

## 2018-04-22 DIAGNOSIS — F43.20 ADJUSTMENT DISORDER, UNSPECIFIED TYPE: ICD-10-CM

## 2018-04-22 DIAGNOSIS — N39.0 URINARY TRACT INFECTION WITHOUT HEMATURIA, SITE UNSPECIFIED: ICD-10-CM

## 2018-04-22 LAB
ALBUMIN UR-MCNC: NEGATIVE MG/DL
ANION GAP SERPL CALCULATED.3IONS-SCNC: 9 MMOL/L (ref 3–14)
APPEARANCE UR: CLEAR
BASOPHILS # BLD AUTO: 0 10E9/L (ref 0–0.2)
BASOPHILS NFR BLD AUTO: 0.6 %
BILIRUB UR QL STRIP: NEGATIVE
BUN SERPL-MCNC: 12 MG/DL (ref 7–30)
CA CARBONATE CRY #/AREA URNS HPF: ABNORMAL /HPF
CALCIUM SERPL-MCNC: 10.1 MG/DL (ref 8.5–10.1)
CHLORIDE SERPL-SCNC: 100 MMOL/L (ref 94–109)
CO2 SERPL-SCNC: 31 MMOL/L (ref 20–32)
COLOR UR AUTO: YELLOW
CREAT SERPL-MCNC: 0.59 MG/DL (ref 0.66–1.25)
DIFFERENTIAL METHOD BLD: NORMAL
EOSINOPHIL # BLD AUTO: 0.2 10E9/L (ref 0–0.7)
EOSINOPHIL NFR BLD AUTO: 3.4 %
ERYTHROCYTE [DISTWIDTH] IN BLOOD BY AUTOMATED COUNT: 13.2 % (ref 10–15)
GFR SERPL CREATININE-BSD FRML MDRD: >90 ML/MIN/1.7M2
GLUCOSE SERPL-MCNC: 99 MG/DL (ref 70–99)
GLUCOSE UR STRIP-MCNC: NEGATIVE MG/DL
HCT VFR BLD AUTO: 47.4 % (ref 40–53)
HGB BLD-MCNC: 15.9 G/DL (ref 13.3–17.7)
HGB UR QL STRIP: NEGATIVE
IMM GRANULOCYTES # BLD: 0 10E9/L (ref 0–0.4)
IMM GRANULOCYTES NFR BLD: 0.2 %
INTERPRETATION ECG - MUSE: NORMAL
KETONES UR STRIP-MCNC: 5 MG/DL
LACTATE BLD-SCNC: 2.7 MMOL/L (ref 0.7–2)
LEUKOCYTE ESTERASE UR QL STRIP: NEGATIVE
LYMPHOCYTES # BLD AUTO: 2.4 10E9/L (ref 0.8–5.3)
LYMPHOCYTES NFR BLD AUTO: 36.1 %
MCH RBC QN AUTO: 27.9 PG (ref 26.5–33)
MCHC RBC AUTO-ENTMCNC: 33.5 G/DL (ref 31.5–36.5)
MCV RBC AUTO: 83 FL (ref 78–100)
MONOCYTES # BLD AUTO: 0.5 10E9/L (ref 0–1.3)
MONOCYTES NFR BLD AUTO: 8.2 %
NEUTROPHILS # BLD AUTO: 3.4 10E9/L (ref 1.6–8.3)
NEUTROPHILS NFR BLD AUTO: 51.5 %
NITRATE UR QL: NEGATIVE
NRBC # BLD AUTO: 0 10*3/UL
NRBC BLD AUTO-RTO: 0 /100
PH UR STRIP: 6.5 PH (ref 5–7)
PLATELET # BLD AUTO: 338 10E9/L (ref 150–450)
POTASSIUM SERPL-SCNC: 4.1 MMOL/L (ref 3.4–5.3)
RBC # BLD AUTO: 5.69 10E12/L (ref 4.4–5.9)
RBC #/AREA URNS AUTO: 2 /HPF (ref 0–2)
SODIUM SERPL-SCNC: 140 MMOL/L (ref 133–144)
SOURCE: ABNORMAL
SP GR UR STRIP: 1.01 (ref 1–1.03)
SQUAMOUS #/AREA URNS AUTO: <1 /HPF (ref 0–1)
UROBILINOGEN UR STRIP-MCNC: 4 MG/DL (ref 0–2)
WBC # BLD AUTO: 6.6 10E9/L (ref 4–11)
WBC #/AREA URNS AUTO: 2 /HPF (ref 0–5)

## 2018-04-22 PROCEDURE — 96365 THER/PROPH/DIAG IV INF INIT: CPT

## 2018-04-22 PROCEDURE — 99207 ZZC DOWN CODE DUE TO INITIAL EXAM: CPT | Performed by: INTERNAL MEDICINE

## 2018-04-22 PROCEDURE — 83605 ASSAY OF LACTIC ACID: CPT | Performed by: EMERGENCY MEDICINE

## 2018-04-22 PROCEDURE — 80048 BASIC METABOLIC PNL TOTAL CA: CPT | Performed by: EMERGENCY MEDICINE

## 2018-04-22 PROCEDURE — 99285 EMERGENCY DEPT VISIT HI MDM: CPT | Mod: 25

## 2018-04-22 PROCEDURE — 25000132 ZZH RX MED GY IP 250 OP 250 PS 637: Mod: GY | Performed by: EMERGENCY MEDICINE

## 2018-04-22 PROCEDURE — 87040 BLOOD CULTURE FOR BACTERIA: CPT | Performed by: EMERGENCY MEDICINE

## 2018-04-22 PROCEDURE — 25000128 H RX IP 250 OP 636: Performed by: EMERGENCY MEDICINE

## 2018-04-22 PROCEDURE — 85025 COMPLETE CBC W/AUTO DIFF WBC: CPT | Performed by: EMERGENCY MEDICINE

## 2018-04-22 PROCEDURE — 71046 X-RAY EXAM CHEST 2 VIEWS: CPT

## 2018-04-22 PROCEDURE — 12000007 ZZH R&B INTERMEDIATE

## 2018-04-22 PROCEDURE — 25000132 ZZH RX MED GY IP 250 OP 250 PS 637: Mod: GY | Performed by: INTERNAL MEDICINE

## 2018-04-22 PROCEDURE — 93005 ELECTROCARDIOGRAM TRACING: CPT

## 2018-04-22 PROCEDURE — 81001 URINALYSIS AUTO W/SCOPE: CPT | Performed by: EMERGENCY MEDICINE

## 2018-04-22 PROCEDURE — A9270 NON-COVERED ITEM OR SERVICE: HCPCS | Mod: GY | Performed by: EMERGENCY MEDICINE

## 2018-04-22 PROCEDURE — 99221 1ST HOSP IP/OBS SF/LOW 40: CPT | Mod: AI | Performed by: INTERNAL MEDICINE

## 2018-04-22 PROCEDURE — 25000128 H RX IP 250 OP 636: Performed by: INTERNAL MEDICINE

## 2018-04-22 PROCEDURE — A9270 NON-COVERED ITEM OR SERVICE: HCPCS | Mod: GY | Performed by: INTERNAL MEDICINE

## 2018-04-22 RX ORDER — AMPICILLIN AND SULBACTAM 2; 1 G/1; G/1
3 INJECTION, POWDER, FOR SOLUTION INTRAMUSCULAR; INTRAVENOUS ONCE
Status: COMPLETED | OUTPATIENT
Start: 2018-04-22 | End: 2018-04-22

## 2018-04-22 RX ORDER — BISACODYL 5 MG
10 TABLET, DELAYED RELEASE (ENTERIC COATED) ORAL DAILY PRN
Status: DISCONTINUED | OUTPATIENT
Start: 2018-04-22 | End: 2018-04-23 | Stop reason: HOSPADM

## 2018-04-22 RX ORDER — TIZANIDINE 2 MG/1
2-4 TABLET ORAL 3 TIMES DAILY
Status: DISCONTINUED | OUTPATIENT
Start: 2018-04-22 | End: 2018-04-23 | Stop reason: HOSPADM

## 2018-04-22 RX ORDER — OXYBUTYNIN CHLORIDE 5 MG/1
5 TABLET ORAL 4 TIMES DAILY
Status: DISCONTINUED | OUTPATIENT
Start: 2018-04-22 | End: 2018-04-23 | Stop reason: HOSPADM

## 2018-04-22 RX ORDER — BISACODYL 5 MG
15 TABLET, DELAYED RELEASE (ENTERIC COATED) ORAL DAILY PRN
Status: DISCONTINUED | OUTPATIENT
Start: 2018-04-22 | End: 2018-04-23 | Stop reason: HOSPADM

## 2018-04-22 RX ORDER — AMOXICILLIN 250 MG
2 CAPSULE ORAL 2 TIMES DAILY
Status: DISCONTINUED | OUTPATIENT
Start: 2018-04-22 | End: 2018-04-23 | Stop reason: HOSPADM

## 2018-04-22 RX ORDER — BACLOFEN 20 MG/1
20 TABLET ORAL ONCE
Status: COMPLETED | OUTPATIENT
Start: 2018-04-22 | End: 2018-04-22

## 2018-04-22 RX ORDER — BISACODYL 10 MG
10 SUPPOSITORY, RECTAL RECTAL DAILY PRN
Status: DISCONTINUED | OUTPATIENT
Start: 2018-04-22 | End: 2018-04-23 | Stop reason: HOSPADM

## 2018-04-22 RX ORDER — OXYBUTYNIN CHLORIDE 5 MG/1
5 TABLET ORAL ONCE
Status: COMPLETED | OUTPATIENT
Start: 2018-04-22 | End: 2018-04-22

## 2018-04-22 RX ORDER — CLONAZEPAM 0.5 MG/1
0.5 TABLET ORAL ONCE
Status: COMPLETED | OUTPATIENT
Start: 2018-04-22 | End: 2018-04-22

## 2018-04-22 RX ORDER — SODIUM CHLORIDE 9 MG/ML
INJECTION, SOLUTION INTRAVENOUS CONTINUOUS
Status: DISCONTINUED | OUTPATIENT
Start: 2018-04-22 | End: 2018-04-23

## 2018-04-22 RX ORDER — ONDANSETRON 4 MG/1
4 TABLET, ORALLY DISINTEGRATING ORAL EVERY 6 HOURS PRN
Status: DISCONTINUED | OUTPATIENT
Start: 2018-04-22 | End: 2018-04-23 | Stop reason: HOSPADM

## 2018-04-22 RX ORDER — BISACODYL 5 MG
5 TABLET, DELAYED RELEASE (ENTERIC COATED) ORAL DAILY PRN
Status: DISCONTINUED | OUTPATIENT
Start: 2018-04-22 | End: 2018-04-23 | Stop reason: HOSPADM

## 2018-04-22 RX ORDER — ONDANSETRON 2 MG/ML
4 INJECTION INTRAMUSCULAR; INTRAVENOUS EVERY 6 HOURS PRN
Status: DISCONTINUED | OUTPATIENT
Start: 2018-04-22 | End: 2018-04-23 | Stop reason: HOSPADM

## 2018-04-22 RX ORDER — POLYETHYLENE GLYCOL 3350 17 G/17G
17 POWDER, FOR SOLUTION ORAL DAILY PRN
Status: DISCONTINUED | OUTPATIENT
Start: 2018-04-22 | End: 2018-04-23 | Stop reason: HOSPADM

## 2018-04-22 RX ORDER — AMPICILLIN AND SULBACTAM 2; 1 G/1; G/1
3 INJECTION, POWDER, FOR SOLUTION INTRAMUSCULAR; INTRAVENOUS EVERY 6 HOURS
Status: DISCONTINUED | OUTPATIENT
Start: 2018-04-23 | End: 2018-04-23

## 2018-04-22 RX ORDER — MULTIVITAMIN,THERAPEUTIC
1 TABLET ORAL DAILY
Status: DISCONTINUED | OUTPATIENT
Start: 2018-04-23 | End: 2018-04-23 | Stop reason: HOSPADM

## 2018-04-22 RX ORDER — ACETAMINOPHEN 325 MG/1
650 TABLET ORAL EVERY 4 HOURS PRN
Status: DISCONTINUED | OUTPATIENT
Start: 2018-04-22 | End: 2018-04-23 | Stop reason: HOSPADM

## 2018-04-22 RX ORDER — AMOXICILLIN 250 MG
1 CAPSULE ORAL 2 TIMES DAILY
Status: DISCONTINUED | OUTPATIENT
Start: 2018-04-22 | End: 2018-04-23 | Stop reason: HOSPADM

## 2018-04-22 RX ORDER — NALOXONE HYDROCHLORIDE 0.4 MG/ML
.1-.4 INJECTION, SOLUTION INTRAMUSCULAR; INTRAVENOUS; SUBCUTANEOUS
Status: DISCONTINUED | OUTPATIENT
Start: 2018-04-22 | End: 2018-04-23 | Stop reason: HOSPADM

## 2018-04-22 RX ORDER — CLONAZEPAM 0.5 MG/1
0.5 TABLET ORAL 2 TIMES DAILY
Status: DISCONTINUED | OUTPATIENT
Start: 2018-04-22 | End: 2018-04-23

## 2018-04-22 RX ADMIN — CLONAZEPAM 0.5 MG: 0.5 TABLET ORAL at 22:20

## 2018-04-22 RX ADMIN — OXYBUTYNIN CHLORIDE 5 MG: 5 TABLET ORAL at 20:39

## 2018-04-22 RX ADMIN — SENNOSIDES AND DOCUSATE SODIUM 1 TABLET: 8.6; 5 TABLET ORAL at 22:20

## 2018-04-22 RX ADMIN — BACLOFEN 30 MG: 20 TABLET ORAL at 23:29

## 2018-04-22 RX ADMIN — CLONAZEPAM 0.5 MG: 0.5 TABLET ORAL at 20:07

## 2018-04-22 RX ADMIN — BACLOFEN 20 MG: 20 TABLET ORAL at 20:39

## 2018-04-22 RX ADMIN — TIZANIDINE 4 MG: 2 TABLET ORAL at 23:30

## 2018-04-22 RX ADMIN — AMPICILLIN SODIUM AND SULBACTAM SODIUM 3 G: 2; 1 INJECTION, POWDER, FOR SOLUTION INTRAMUSCULAR; INTRAVENOUS at 19:26

## 2018-04-22 RX ADMIN — SODIUM CHLORIDE: 9 INJECTION, SOLUTION INTRAVENOUS at 22:20

## 2018-04-22 RX ADMIN — SODIUM CHLORIDE 1000 ML: 9 INJECTION, SOLUTION INTRAVENOUS at 19:35

## 2018-04-22 RX ADMIN — OXYBUTYNIN CHLORIDE 5 MG: 5 TABLET ORAL at 23:30

## 2018-04-22 NOTE — IP AVS SNAPSHOT
MRN:[id number]                      After Visit Summary   4/22/2018    Jose Lopez    MRN: [id number]           Thank you!     Thank you for choosing [facility] for your care. Our goal is always to provide you with excellent care. Hearing back from our patients is one way we can continue to improve our services. Please take a few minutes to complete the written survey that you may receive in the mail after you visit with us. Thank you!        Patient Information     Date Of Birth          1960        Designated Caregiver       Most Recent Value    Caregiver    Will someone help with your care after discharge? no      About your hospital stay     You were admitted on:  April 22, 2018 You last received care in the:  [place] [address] [facility]    You were discharged on:  April 23, 2018        Reason for your hospital stay       Sepsis secondary to complicated UTI, confusion                  Who to Call     For medical emergencies, please call 911.  For non-urgent questions about your medical care, please call your primary care provider or clinic, [phone]          Attending Provider     Provider Specialty    Taye Guidry MD Emergency Medicine    [facility], Patricia NULL MD Internal Medicine       Primary Care Provider Office Phone # Fax #    Mpho Prakash Hollis [phone]      After Care Instructions     Activity       Your activity upon discharge: activity as tolerated            Diet       Follow this diet upon discharge: Regular                  Follow-up Appointments     Follow-up and recommended labs and tests        Follow up with primary care provider, [facility], within 7 days for hospital follow- up.  No follow up labs or test are needed.    Follow up with PM&R clinic as already scheduled on 4/30/18 for chronic pain management and spacticity.            Follow-up and recommended labs and tests        Your  already-scheduled appointments include:     Scheduled patient for hospital followup:  Wed April 25 at 1:50pm Dr. Hollis at the [facility] ([address], [phone])  *this is to review your [facility] Hospital visit, urine infection, and sepsis!    Patient already has the following appointments:   4/30 at 2:15pm with Dr. Chloe Fountain ([address], [phone])   5/9 at 9am with pharmacist Janine Marcos ([address], [phone])  6/5 at 10am Pain management with Chloe Angulo NP ([address], [phone])     Voicemail left on patient's request requesting [facility] ([phone]) appointment sooner, and also assisted in completing a Release of Information form for the pain clinic at patient's request.                  Your next 10 appointments already scheduled     Oct 03, 2018  9:40 AM CDT   (Arrive by 9:25 AM)   Return Visit with Jose Belle MD   [facility] ([facility])    [address] [place] [id number] [phone]              Further instructions from your care team       A follow-up appointment was scheduled for you [see above].  It is very important to go to it--bring these papers and your insurance card with you.  At the visit, talk about your hospital stay.  Tell your doctor how you feel.  Show your new list of medications.  Your doctor will update records, make sure you are still doing OK, and decide if any tests or medication changes are needed.          Pending Results     Date and Time Order Name Status Description    4/22/2018 1834 Blood culture Preliminary     4/22/2018 1834 Blood culture Preliminary             Statement of Approval     Ordered          04/23/18 1127  I have reviewed and agree with all the recommendations and orders detailed in this document.  EFFECTIVE NOW     Approved and electronically signed by:  Yvette Amaro MD   "           Admission Information     Date & Time Provider Department Dept. Phone    2018 Patricia Person MD [place] [address] [facility] [phone]      Your Vitals Were     Blood Pressure Pulse Temperature Respirations Pulse Oximetry       139/84 (BP Location: Left arm) 65 98.4  F (36.9  C) (Oral) 16 96%       MyChart Information     [email or web address] lets you send messages to your doctor, view your test results, renew your prescriptions, schedule appointments and more. To sign up, go to [email or web address] . Click on \"Log in\" on the left side of the screen, which will take you to the Welcome page. Then click on \"Sign up Now\" on the right side of the page.     You will be asked to enter the access code listed below, as well as some personal information. Please follow the directions to create your username and password.     Your access code is: [email or web address]  Expires: 2018  6:30 AM     Your access code will  in 90 days. If you need help or a new code, please call your [place] clinic or [phone].        Care EveryWhere ID     This is your Care EveryWhere ID. This could be used by other organizations to access your [place] medical records  [id number]        Equal Access to Services     JAYLEN DOUGLASS : Hadii power epperson hadasho Sochipali, waaxda luqadaha, qaybta kaalmada adeegyada, forrest torres . So [facility] [phone].    ATENCIÓN: Si habla español, tiene a reyes disposición servicios gratuitos de asistencia lingüística. Kristi al [phone].    We comply with applicable federal civil rights laws and [place] laws. We do not discriminate on the basis of race, color, national origin, age, disability, sex, sexual orientation, or gender identity.               Review of your medicines      CONTINUE these medicines which may have CHANGED, or have new prescriptions. If we are uncertain of the size of tablets/capsules you have at home, strength may be listed as " something that might have changed.        Dose / Directions    clonazePAM 0.5 MG tablet   Commonly known as:  klonoPIN   This may have changed:    - how much to take  - when to take this  - reasons to take this  - additional instructions   Used for:  Adjustment disorder, unspecified type        Dose:  0.25-0.5 mg   Take 0.5-1 tablets (0.25-0.5 mg) by mouth 2 times daily as needed for anxiety Take 0.5 tablet at 3 PM and 1 tablet at 9 PM   Quantity:  5 tablet   Refills:  0         CONTINUE these medicines which have NOT CHANGED        Dose / Directions    amoxicillin-clavulanate 875-125 MG per tablet   Commonly known as:  AUGMENTIN   Indication:  Urinary Tract Infection        Dose:  1 tablet   Take 1 tablet by mouth every 12 hours for 14 days   Quantity:  28 tablet   Refills:  0       baclofen 20 MG tablet   Commonly known as:  LIORESAL        Dose:  30 mg   Take 30 mg by mouth 4 times daily   Refills:  0       hyoscyamine 0.125 MG sublingual tablet   Commonly known as:  LEVSIN/SL        Dose:  0.125 mg   Place 0.125 mg under the tongue every 4 hours as needed for cramping   Refills:  0       MULTIVITAMIN ADULT PO        Dose:  1 tablet   Take 1 tablet by mouth daily   Refills:  0       omeprazole 20 MG CR capsule   Commonly known as:  priLOSEC        Dose:  20 mg   Take 20 mg by mouth daily as needed   Refills:  3       oxybutynin 5 MG tablet   Commonly known as:  DITROPAN        Take by mouth 4 times daily   Refills:  0       tiZANidine 2 MG tablet   Commonly known as:  ZANAFLEX   Used for:  Muscle spasticity        Dose:  2-4 mg   Take 1-2 tablets (2-4 mg) by mouth 3 times daily   Quantity:  180 tablet   Refills:  6       VIAGRA 100 MG tablet   Generic drug:  sildenafil        Dose:  100 mg   Take 100 mg by mouth as needed   Refills:  3            Where to get your medicines      These medications were sent to [facility] - [place] - [address]  [address] [place] [address], [place] [address]      Phone:  [phone]     amoxicillin-clavulanate 875-125 MG per tablet         Some of these will need a paper prescription and others can be bought over the counter. Ask your nurse if you have questions.     Bring a paper prescription for each of these medications     clonazePAM 0.5 MG tablet                Protect others around you: Learn how to safely use, store and throw away your medicines at www.disposemymeds.org.        ANTIBIOTIC INSTRUCTION     You've Been Prescribed an Antibiotic - Now What?  Your healthcare team thinks that you or your loved one might have an infection. Some infections can be treated with antibiotics, which are powerful, life-saving drugs. Like all medications, antibiotics have side effects and should only be used when necessary. There are some important things you should know about your antibiotic treatment.      Your healthcare team may run tests before you start taking an antibiotic.    Your team may take samples (e.g., from your blood, urine or other areas) to run tests to look for bacteria. These test can be important to determine if you need an antibiotic at all and, if you do, which antibiotic will work best.      Within a few days, your healthcare team might change or even stop your antibiotic.    Your team may start you on an antibiotic while they are working to find out what is making you sick.    Your team might change your antibiotic because test results show that a different antibiotic would be better to treat your infection.    In some cases, once your team has more information, they learn that you do not need an antibiotic at all. They may find out that you don't have an infection, or that the antibiotic you're taking won't work against your infection. For example, an infection caused by a virus can't be treated with antibiotics. Staying on an antibiotic when you don't need it is more likely to be harmful than helpful.      You may experience side effects from your  antibiotic.    Like all medications, antibiotics have side effects. Some of these can be serious.    Let you healthcare team know if you have any known allergies when you are admitted to the hospital.    One significant side effect of nearly all antibiotics is the risk of severe and sometimes deadly diarrhea caused by Clostridium difficile (C. Difficile). This occurs when a person takes antibiotics because some good germs are destroyed. Antibiotic use allows C. diificile to take over, putting patients at high risk for this serious infection.    As a patient or caregiver, it is important to understand your or your loved one's antibiotic treatment. It is especially important for caregivers to speak up when patients can't speak for themselves. Here are some important questions to ask your healthcare team.    What infection is this antibiotic treating and how do you know I have that infection?    What side effects might occur from this antibiotic?    How long will I need to take this antibiotic?    Is it safe to take this antibiotic with other medications or supplements (e.g., vitamins) that I am taking?     Are there any special directions I need to know about taking this antibiotic? For example, should I take it with food?    How will I be monitored to know whether my infection is responding to the antibiotic?    What tests may help to make sure the right antibiotic is prescribed for me?      Information provided by:  www.cdc.gov/getsmart  U.S. Department of Health and Human Services  Centers for disease Control and Prevention  National Center for Emerging and Zoonotic Infectious Diseases  Division of Healthcare Quality Promotion             Medication List: This is a list of all your medications and when to take them. Check marks below indicate your daily home schedule. Keep this list as a reference.      Medications           Morning Afternoon Evening Bedtime As Needed    amoxicillin-clavulanate 875-125 MG per  tablet   Commonly known as:  AUGMENTIN   Take 1 tablet by mouth every 12 hours for 14 days   Last time this was given:  1 tablet on 4/23/2018 12:21 PM   Next Dose Due:  This evening at 10 pm                                       baclofen 20 MG tablet   Commonly known as:  LIORESAL   Take 30 mg by mouth 4 times daily   Last time this was given:  30 mg on 4/23/2018 12:22 PM   Next Dose Due:  This evening at 6 pm and 10 pm                                             clonazePAM 0.5 MG tablet   Commonly known as:  klonoPIN   Take 0.5-1 tablets (0.25-0.5 mg) by mouth 2 times daily as needed for anxiety Take 0.5 tablet at 3 PM and 1 tablet at 9 PM   Last time this was given:  0.5 mg on 4/23/2018  8:37 AM   Next Dose Due:  At 3 pm this afternoon                                    hyoscyamine 0.125 MG sublingual tablet   Commonly known as:  LEVSIN/SL   Place 0.125 mg under the tongue every 4 hours as needed for cramping   Next Dose Due:  Resume home schedule                                    MULTIVITAMIN ADULT PO   Take 1 tablet by mouth daily   Next Dose Due:  Tomorrow AM (4/24/18)                                    omeprazole 20 MG CR capsule   Commonly known as:  priLOSEC   Take 20 mg by mouth daily as needed   Next Dose Due:  Resume home schedule                                    oxybutynin 5 MG tablet   Commonly known as:  DITROPAN   Take by mouth 4 times daily   Last time this was given:  5 mg on 4/23/2018  1:48 PM   Next Dose Due:  This evening at 6 pm and 10 pm                                             tiZANidine 2 MG tablet   Commonly known as:  ZANAFLEX   Take 1-2 tablets (2-4 mg) by mouth 3 times daily   Last time this was given:  4 mg on 4/23/2018  8:38 AM   Next Dose Due:  This evening at 6 pm and 10 pm                                          VIAGRA 100 MG tablet   Take 100 mg by mouth as needed   Generic drug:  sildenafil   Next Dose Due:  Resume home schedule                                               More Information        Understanding Urinary Tract Infections (UTIs)  Most UTIs are caused by bacteria, although they may also be caused by viruses or fungi. Bacteria from the bowel are the most common source of infection. The infection may start because of any of the following:    Sexual activity. During sex, bacteria can travel from the penis, vagina, or rectum into the urethra.     Bacteria on the skin outside the rectum may travel into the urethra. This is more common in women since the rectum and urethra are closer to each other than in men. Wiping from front to back after using the toilet and keeping the area clean can help prevent germs from getting to the urethra.    Blockage of urine flow through the urinary tract. If urine sits too long, germs may start to grow out of control.      Parts of the urinary tract  The infection can occur in any part of the urinary tract.    The kidneys collect and store urine.    The ureters carry urine from the kidneys to the bladder.    The bladder holds urine until you are ready to let it out.    The urethra carries urine from the bladder out of the body. It is shorter in women, so bacteria can move through it more easily. The urethra is longer in men, so a UTI is less likely to reach the bladder or kidneys in men.  Date Last Reviewed: 1/1/2017 2000-2017 [employer] [address] [place] [address], [place] [address]. All rights reserved. This information is not intended as a substitute for professional medical care. Always follow your healthcare professional's instructions.

## 2018-04-22 NOTE — IP AVS SNAPSHOT
[address] [facility]    [address] [place] [address]    Phone:  [phone]                                       After Visit Summary   4/22/2018    Jose Lopez    MRN: [id number]           After Visit Summary Signature Page     I have received my discharge instructions, and my questions have been answered. I have discussed any challenges I see with this plan with the nurse or doctor.    ..........................................................................................................................................  Patient/Patient Representative Signature      ..........................................................................................................................................  Patient Representative Print Name and Relationship to Patient    ..................................................               ................................................  Date                                            Time    ..........................................................................................................................................  Reviewed by Signature/Title    ...................................................              ..............................................  Date                                                            Time

## 2018-04-22 NOTE — ED NOTES
Bed: ST01  Expected date:   Expected time:   Means of arrival:   Comments:  531  58 M altered tremor  1815

## 2018-04-23 VITALS
TEMPERATURE: 98.4 F | HEART RATE: 65 BPM | DIASTOLIC BLOOD PRESSURE: 84 MMHG | SYSTOLIC BLOOD PRESSURE: 139 MMHG | RESPIRATION RATE: 16 BRPM | OXYGEN SATURATION: 96 %

## 2018-04-23 LAB
ANION GAP SERPL CALCULATED.3IONS-SCNC: 6 MMOL/L (ref 3–14)
BUN SERPL-MCNC: 14 MG/DL (ref 7–30)
CALCIUM SERPL-MCNC: 8.2 MG/DL (ref 8.5–10.1)
CHLORIDE SERPL-SCNC: 103 MMOL/L (ref 94–109)
CO2 SERPL-SCNC: 29 MMOL/L (ref 20–32)
CREAT SERPL-MCNC: 0.74 MG/DL (ref 0.66–1.25)
ERYTHROCYTE [DISTWIDTH] IN BLOOD BY AUTOMATED COUNT: 13.3 % (ref 10–15)
GFR SERPL CREATININE-BSD FRML MDRD: >90 ML/MIN/1.7M2
GLUCOSE SERPL-MCNC: 95 MG/DL (ref 70–99)
HCT VFR BLD AUTO: 40.4 % (ref 40–53)
HGB BLD-MCNC: 13.4 G/DL (ref 13.3–17.7)
LACTATE BLD-SCNC: 1.1 MMOL/L (ref 0.7–2)
MCH RBC QN AUTO: 27.3 PG (ref 26.5–33)
MCHC RBC AUTO-ENTMCNC: 33.2 G/DL (ref 31.5–36.5)
MCV RBC AUTO: 82 FL (ref 78–100)
PLATELET # BLD AUTO: 256 10E9/L (ref 150–450)
POTASSIUM SERPL-SCNC: 3.5 MMOL/L (ref 3.4–5.3)
RBC # BLD AUTO: 4.91 10E12/L (ref 4.4–5.9)
SODIUM SERPL-SCNC: 138 MMOL/L (ref 133–144)
WBC # BLD AUTO: 7.8 10E9/L (ref 4–11)

## 2018-04-23 PROCEDURE — 80048 BASIC METABOLIC PNL TOTAL CA: CPT | Performed by: INTERNAL MEDICINE

## 2018-04-23 PROCEDURE — 36415 COLL VENOUS BLD VENIPUNCTURE: CPT | Performed by: INTERNAL MEDICINE

## 2018-04-23 PROCEDURE — 25000128 H RX IP 250 OP 636: Performed by: INTERNAL MEDICINE

## 2018-04-23 PROCEDURE — 85027 COMPLETE CBC AUTOMATED: CPT | Performed by: INTERNAL MEDICINE

## 2018-04-23 PROCEDURE — A9270 NON-COVERED ITEM OR SERVICE: HCPCS | Mod: GY | Performed by: INTERNAL MEDICINE

## 2018-04-23 PROCEDURE — 25000132 ZZH RX MED GY IP 250 OP 250 PS 637: Mod: GY | Performed by: INTERNAL MEDICINE

## 2018-04-23 PROCEDURE — 99239 HOSP IP/OBS DSCHRG MGMT >30: CPT | Performed by: INTERNAL MEDICINE

## 2018-04-23 PROCEDURE — 83605 ASSAY OF LACTIC ACID: CPT | Performed by: INTERNAL MEDICINE

## 2018-04-23 RX ORDER — CLONAZEPAM 0.5 MG/1
0.25 TABLET ORAL DAILY
Status: DISCONTINUED | OUTPATIENT
Start: 2018-04-23 | End: 2018-04-23 | Stop reason: HOSPADM

## 2018-04-23 RX ORDER — CLONAZEPAM 0.5 MG/1
0.5 TABLET ORAL DAILY
Status: DISCONTINUED | OUTPATIENT
Start: 2018-04-23 | End: 2018-04-23 | Stop reason: HOSPADM

## 2018-04-23 RX ORDER — CLONAZEPAM 0.5 MG/1
0.25-0.5 TABLET ORAL 2 TIMES DAILY PRN
Qty: 5 TABLET | Refills: 0 | Status: ON HOLD | OUTPATIENT
Start: 2018-04-23 | End: 2018-08-17

## 2018-04-23 RX ORDER — CLONAZEPAM 0.5 MG/1
0.5 TABLET ORAL 2 TIMES DAILY
Status: DISCONTINUED | OUTPATIENT
Start: 2018-04-23 | End: 2018-04-23

## 2018-04-23 RX ADMIN — OXYBUTYNIN CHLORIDE 5 MG: 5 TABLET ORAL at 08:37

## 2018-04-23 RX ADMIN — AMPICILLIN SODIUM AND SULBACTAM SODIUM 3 G: 2; 1 INJECTION, POWDER, FOR SOLUTION INTRAMUSCULAR; INTRAVENOUS at 01:03

## 2018-04-23 RX ADMIN — BACLOFEN 30 MG: 20 TABLET ORAL at 12:22

## 2018-04-23 RX ADMIN — OXYBUTYNIN CHLORIDE 5 MG: 5 TABLET ORAL at 13:48

## 2018-04-23 RX ADMIN — THERA TABS 1 TABLET: TAB at 08:38

## 2018-04-23 RX ADMIN — AMPICILLIN SODIUM AND SULBACTAM SODIUM 3 G: 2; 1 INJECTION, POWDER, FOR SOLUTION INTRAMUSCULAR; INTRAVENOUS at 06:27

## 2018-04-23 RX ADMIN — BACLOFEN 30 MG: 20 TABLET ORAL at 08:37

## 2018-04-23 RX ADMIN — AMOXICILLIN AND CLAVULANATE POTASSIUM 1 TABLET: 875; 125 TABLET, FILM COATED ORAL at 12:21

## 2018-04-23 RX ADMIN — TIZANIDINE 4 MG: 2 TABLET ORAL at 08:38

## 2018-04-23 RX ADMIN — SODIUM CHLORIDE: 9 INJECTION, SOLUTION INTRAVENOUS at 08:46

## 2018-04-23 RX ADMIN — CLONAZEPAM 0.5 MG: 0.5 TABLET ORAL at 08:37

## 2018-04-23 NOTE — PROGRESS NOTES
Care Coordination:    Met with patient in room, introduced self and role.  Asked if I could assist in scheduling his follow up appointment with his PCP.  Pt more interested in getting a pain clinic appointment rescheduled to earlier.       Scheduled patient for hospital followup:  Wed April 25 at 1:50pm Dr. Hollis at the [facility] ([address], [phone])    Patient already has the following appointments:   4/30 at 2:15pm with Dr. Chloe Fountain ([address], [phone])   5/9 at 9am with pharmacist Janine Marcos ([address], [phone])  6/5 at 10am Pain management with Chloe Angulo NP ([address], [phone])     Voicemail left on patient's request requesting [facility] ([phone]) appointment sooner, and also assisted in completing a Release of Information form for the pain clinic at patient's request.      Apolonia Junior RN, BSN  [facility] Care Coordinator   Mobile Phone: [phone]

## 2018-04-23 NOTE — ED NOTES
[facility]  ED Nurse Handoff Report    ED Chief complaint: Altered Mental Status (recently here for UTI has not picked up new antibiotics. having repeated questions. Room mate said patient was sleeping all day )      ED Diagnosis:   Final diagnoses:   Urinary tract infection without hematuria, site unspecified   Transient alteration of awareness       Code Status: Not on file    Allergies:   Allergies   Allergen Reactions     Sertraline      Other reaction(s): Gastrointestinal       Activity level - Baseline/Home:  Stand with Assist Pt paraplegic, able to transfer self to home wheelchair with standby assist.    Activity Level - Current:   Stand with Assist of 2 See above.     Needed?: No    Isolation: No  Infection: Not Applicable    Bariatric?: No    Vital Signs:   Vitals:    04/22/18 1825 04/22/18 1910 04/22/18 1914 04/22/18 2000   BP: (!) 134/119 (!) 171/104  109/88   Pulse: 65      Resp: 14  12 11   Temp: 96.1  F (35.6  C)      TempSrc: Rectal      SpO2: 98%          Cardiac Rhythm: ,        Pain level: 0-10 Pain Scale: 0    Is this patient confused?: No     Patient Report: Initial Complaint: Pt presented to the ED via EMS complaining of mid-abdominal pain that spreads to his groin.    Focused Assessment: Pt complains of mid abdominal pain that spreads bilaterally yo his groin.  Pt is a paraplegic and has no sensation below T9 with the exception of his rectum.  Pt strait caths at baseline and reports being recently diagnosed with a UTI, but had problems getting antibiotics and not take any antibiotics following this diagnosis.  Vital signs unremarkable.  Pt alert and oriented x4, but anxious.     Tests Performed: CBC, BMP, Blood cultures x2, UA/UC, Lactic acid. Chest X-ray  Abnormal Results: Lactic acid 2.7, other lab work and chest x-ray unremarkable.     Treatments provided: PT given 1 liter NS, 3 grams IV Unasyn, 0.5mg clonopin PO, awaiting 5 of oxybutin and 20 of baclofen form  pharmacy.    Family Comments: Wife at bedside.    OBS brochure/video discussed/provided to patient: N/A    ED Medications:   Medications   ampicillin-sulbactam (UNASYN) 3 g vial to attach to  mL bag (3 g Intravenous New Bag 4/22/18 1926)   baclofen (LIORESAL) tablet 20 mg (not administered)   oxybutynin (DITROPAN) tablet 5 mg (not administered)   0.9% sodium chloride BOLUS (1,000 mLs Intravenous New Bag 4/22/18 1935)   clonazePAM (klonoPIN) tablet 0.5 mg (0.5 mg Oral Given 4/22/18 2007)       Drips infusing?:  No    For the majority of the shift this patient was Green.   Interventions performed were NA.    Severe Sepsis OR Septic Shock Diagnosis Present: No      ED NURSE PHONE NUMBER: [phone]

## 2018-04-23 NOTE — PHARMACY-ADMISSION MEDICATION HISTORY
Admission medication history interview status for the 4/22/2018  admission is complete. See EPIC admission navigator for prior to admission medications     Medication history source reliability:Good    Actions taken by pharmacist (provider contacted, etc): Patient has altered mental status, called [facility] ([facility] [employer] [facility], not home store) to confirm medications. Patient was able to state last doses, but family in room is unsure if true due to altered mental status.     Additional medication history information not noted on PTA med list :    1. Medications added: Multivitamin and Hyoscyamine  2. Medications deleted: Ibuprofen 800mg  3. Medications modified: Omeprazole changed from scheduled to as needed per patient.   4. Of note, Augmentin was just picked up yesterday on 4/21/18.     Medication reconciliation/reorder completed by provider prior to medication history? No    Time spent in this activity: 15 minutes    Prior to Admission medications    Medication Sig Last Dose Taking? Auth Provider   amoxicillin-clavulanate (AUGMENTIN) 875-125 MG per tablet Take 1 tablet by mouth every 12 hours for 14 days 4/21/2018 Yes Brian Cain MD   baclofen (LIORESAL) 20 MG tablet Take 30 mg by mouth 4 times daily 4/22/2018 at 1500 Yes Reported, Patient   clonazePAM (KLONOPIN) 0.5 MG tablet Take 0.5 mg by mouth 2 times daily 4/22/2018 at 1400 Yes Reported, Patient   hyoscyamine (LEVSIN/SL) 0.125 MG sublingual tablet Place 0.125 mg under the tongue every 4 hours as needed for cramping Past Week at prn Yes Unknown, Entered By History   Multiple Vitamins-Minerals (MULTIVITAMIN ADULT PO) Take 1 tablet by mouth daily 4/22/2018 at am Yes Unknown, Entered By History   omeprazole (PRILOSEC) 20 MG CR capsule Take 20 mg by mouth daily as needed  Past Week at prn Yes Reported, Patient   oxybutynin (DITROPAN) 5 MG tablet Take by mouth 4 times daily 4/22/2018 at am Yes Reported, Patient   tiZANidine (ZANAFLEX) 2 MG tablet Take 1-2 tablets  (2-4 mg) by mouth 3 times daily Past Week at prn Yes Jose Belle MD   VIAGRA 100 MG tablet Take 100 mg by mouth as needed prn at prn Yes Reported, Patient

## 2018-04-23 NOTE — PLAN OF CARE
Problem: Infection, Risk/Actual (Adult)  Goal: Identify Related Risk Factors and Signs and Symptoms  Related risk factors and signs and symptoms are identified upon initiation of Human Response Clinical Practice Guideline (CPG).   Outcome: No Change  Patient arrived to floor at 2130.  Patient transfers with lift; wheelchair bound at baseline, paraplegic.  Denies pain.  VSS on RA; afebrile.  A/Ox4; anxious and restless.  Neuros intact.  Good PO intake.  Patient self catheterizes at baseline.  NS@100.

## 2018-04-23 NOTE — PLAN OF CARE
Problem: Patient Care Overview  Goal: Plan of Care/Patient Progress Review  Outcome: Improving  Altered mental status/confusion, ED admit @2130 on 4/23/18. A/Ox2 to self and time; anxious, fixates easily. Up with lift; WC baseline. Voids via self cath. IV infusing. VSS on RA. DC TBD.
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
## 2018-04-24 NOTE — PLAN OF CARE
Problem: Patient Care Overview  Goal: Plan of Care/Patient Progress Review  Outcome: Adequate for Discharge Date Met: 04/23/18  1430: Confusion improved. Pt A&Ox4 but forgetful at times. VSS; no c/o pain. Tolerated diet. Discharge instructions reviewed with pt; questions answered. A copy of discharge AVS given. All belongings returned. Pt discharged from floor via own w/c accompanied by friend and RN (writer). Pt's friend providing transportation to home.

## 2018-04-28 LAB
BACTERIA SPEC CULT: NO GROWTH
BACTERIA SPEC CULT: NO GROWTH
Lab: NORMAL
Lab: NORMAL
SPECIMEN SOURCE: NORMAL
SPECIMEN SOURCE: NORMAL

## 2018-05-09 ENCOUNTER — TELEPHONE (OUTPATIENT)
Dept: PHYSICAL MEDICINE AND REHAB | Facility: CLINIC | Age: 58
End: 2018-05-09

## 2018-05-09 NOTE — TELEPHONE ENCOUNTER
[facility] Call Center    Phone Message    May a detailed message be left on voicemail: yes    Reason for Call: Other: Pt called with a question about his appointment on 10/3 with Dr Belle. He mentioned discussing an enema or some type of machine that would help him use the bathroom. He wanted to know if this equipment was going to be available or discussed at his appointment in October.     Action Taken: Message routed to:  [facility] [place]

## 2018-05-10 NOTE — TELEPHONE ENCOUNTER
Patient was called and would like to go ahead with the training of  the Peristeen Bowel Evacuation at his next appointment with Dr Belle in October. He will need to bring his own side opening toilet seat for the training.

## 2018-06-18 ENCOUNTER — TELEPHONE (OUTPATIENT)
Dept: PHYSICAL MEDICINE AND REHAB | Facility: CLINIC | Age: 58
End: 2018-06-18

## 2018-06-18 NOTE — TELEPHONE ENCOUNTER
[facility] Call Center    Phone Message    May a detailed message be left on voicemail: no    Reason for Call: Other: Pt called in to check the time/date of his appt with Dr. Belle. Pt thought his appt was in June or July, not October. Pt would like to possibly see if he could be seen sooner, but more importantly discuss why it was scheduled out so far. He thought it was a routine 3 month f/u, which is written in the appt note. Please contact Pt.     Action Taken: Message routed to:  [facility] ([facility]

## 2018-07-20 ENCOUNTER — TRANSFERRED RECORDS (OUTPATIENT)
Dept: HEALTH INFORMATION MANAGEMENT | Facility: CLINIC | Age: 58
End: 2018-07-20

## 2018-08-16 ENCOUNTER — HOSPITAL ENCOUNTER (OUTPATIENT)
Facility: CLINIC | Age: 58
Setting detail: OBSERVATION
Discharge: HOME | End: 2018-08-19
Attending: EMERGENCY MEDICINE | Admitting: HOSPITALIST
Payer: MEDICARE

## 2018-08-16 DIAGNOSIS — G82.20 PARAPLEGIA (H): ICD-10-CM

## 2018-08-16 DIAGNOSIS — G90.9 AUTONOMIC DYSFUNCTION: ICD-10-CM

## 2018-08-16 DIAGNOSIS — N39.0 URINARY TRACT INFECTION WITHOUT HEMATURIA, SITE UNSPECIFIED: Primary | ICD-10-CM

## 2018-08-16 DIAGNOSIS — E87.6 HYPOKALEMIA: ICD-10-CM

## 2018-08-16 LAB
ALBUMIN SERPL-MCNC: 4.1 G/DL (ref 3.4–5)
ALBUMIN UR-MCNC: 30 MG/DL
ALP SERPL-CCNC: 87 U/L (ref 40–150)
ALT SERPL W P-5'-P-CCNC: 67 U/L (ref 0–70)
ANION GAP SERPL CALCULATED.3IONS-SCNC: 15 MMOL/L (ref 3–14)
APPEARANCE UR: CLEAR
AST SERPL W P-5'-P-CCNC: 61 U/L (ref 0–45)
BASOPHILS # BLD AUTO: 0.1 10E9/L (ref 0–0.2)
BASOPHILS NFR BLD AUTO: 0.5 %
BILIRUB SERPL-MCNC: 0.5 MG/DL (ref 0.2–1.3)
BILIRUB UR QL STRIP: NEGATIVE
BUN SERPL-MCNC: 30 MG/DL (ref 7–30)
CALCIUM SERPL-MCNC: 9.2 MG/DL (ref 8.5–10.1)
CHLORIDE SERPL-SCNC: 104 MMOL/L (ref 94–109)
CO2 SERPL-SCNC: 21 MMOL/L (ref 20–32)
COLOR UR AUTO: YELLOW
CREAT SERPL-MCNC: 1.06 MG/DL (ref 0.66–1.25)
DIFFERENTIAL METHOD BLD: ABNORMAL
EOSINOPHIL # BLD AUTO: 0.3 10E9/L (ref 0–0.7)
EOSINOPHIL NFR BLD AUTO: 2.7 %
ERYTHROCYTE [DISTWIDTH] IN BLOOD BY AUTOMATED COUNT: 13.9 % (ref 10–15)
GFR SERPL CREATININE-BSD FRML MDRD: 72 ML/MIN/1.7M2
GLUCOSE SERPL-MCNC: 98 MG/DL (ref 70–99)
GLUCOSE UR STRIP-MCNC: NEGATIVE MG/DL
HCT VFR BLD AUTO: 42 % (ref 40–53)
HGB BLD-MCNC: 14.4 G/DL (ref 13.3–17.7)
HGB UR QL STRIP: ABNORMAL
HYALINE CASTS #/AREA URNS LPF: 1 /LPF (ref 0–2)
IMM GRANULOCYTES # BLD: 0 10E9/L (ref 0–0.4)
IMM GRANULOCYTES NFR BLD: 0.2 %
INR PPP: 1.02 (ref 0.86–1.14)
INTERPRETATION ECG - MUSE: NORMAL
KETONES UR STRIP-MCNC: 40 MG/DL
LEUKOCYTE ESTERASE UR QL STRIP: ABNORMAL
LYMPHOCYTES # BLD AUTO: 2.8 10E9/L (ref 0.8–5.3)
LYMPHOCYTES NFR BLD AUTO: 26.3 %
MCH RBC QN AUTO: 28.2 PG (ref 26.5–33)
MCHC RBC AUTO-ENTMCNC: 34.3 G/DL (ref 31.5–36.5)
MCV RBC AUTO: 82 FL (ref 78–100)
MONOCYTES # BLD AUTO: 1.6 10E9/L (ref 0–1.3)
MONOCYTES NFR BLD AUTO: 14.7 %
MUCOUS THREADS #/AREA URNS LPF: PRESENT /LPF
NEUTROPHILS # BLD AUTO: 5.9 10E9/L (ref 1.6–8.3)
NEUTROPHILS NFR BLD AUTO: 55.6 %
NITRATE UR QL: NEGATIVE
NRBC # BLD AUTO: 0 10*3/UL
NRBC BLD AUTO-RTO: 0 /100
PH UR STRIP: 6 PH (ref 5–7)
PLATELET # BLD AUTO: 302 10E9/L (ref 150–450)
POTASSIUM SERPL-SCNC: 2.9 MMOL/L (ref 3.4–5.3)
PROT SERPL-MCNC: 7.8 G/DL (ref 6.8–8.8)
RBC # BLD AUTO: 5.11 10E12/L (ref 4.4–5.9)
RBC #/AREA URNS AUTO: 75 /HPF (ref 0–2)
SODIUM SERPL-SCNC: 140 MMOL/L (ref 133–144)
SOURCE: ABNORMAL
SP GR UR STRIP: 1.02 (ref 1–1.03)
SQUAMOUS #/AREA URNS AUTO: 3 /HPF (ref 0–1)
UROBILINOGEN UR STRIP-MCNC: NORMAL MG/DL (ref 0–2)
WBC # BLD AUTO: 10.7 10E9/L (ref 4–11)
WBC #/AREA URNS AUTO: 19 /HPF (ref 0–5)

## 2018-08-16 PROCEDURE — 87086 URINE CULTURE/COLONY COUNT: CPT | Performed by: EMERGENCY MEDICINE

## 2018-08-16 PROCEDURE — 99285 EMERGENCY DEPT VISIT HI MDM: CPT | Mod: 25

## 2018-08-16 PROCEDURE — 96365 THER/PROPH/DIAG IV INF INIT: CPT

## 2018-08-16 PROCEDURE — 87088 URINE BACTERIA CULTURE: CPT | Performed by: EMERGENCY MEDICINE

## 2018-08-16 PROCEDURE — 87186 SC STD MICRODIL/AGAR DIL: CPT | Performed by: EMERGENCY MEDICINE

## 2018-08-16 PROCEDURE — 83735 ASSAY OF MAGNESIUM: CPT | Performed by: EMERGENCY MEDICINE

## 2018-08-16 PROCEDURE — 81001 URINALYSIS AUTO W/SCOPE: CPT | Performed by: EMERGENCY MEDICINE

## 2018-08-16 PROCEDURE — 80053 COMPREHEN METABOLIC PANEL: CPT | Performed by: EMERGENCY MEDICINE

## 2018-08-16 PROCEDURE — 85610 PROTHROMBIN TIME: CPT | Performed by: EMERGENCY MEDICINE

## 2018-08-16 PROCEDURE — 93005 ELECTROCARDIOGRAM TRACING: CPT

## 2018-08-16 PROCEDURE — 85025 COMPLETE CBC W/AUTO DIFF WBC: CPT | Performed by: EMERGENCY MEDICINE

## 2018-08-16 PROCEDURE — 87106 FUNGI IDENTIFICATION YEAST: CPT | Performed by: EMERGENCY MEDICINE

## 2018-08-16 RX ORDER — OXYBUTYNIN CHLORIDE 5 MG/1
5 TABLET ORAL ONCE
Status: COMPLETED | OUTPATIENT
Start: 2018-08-16 | End: 2018-08-17

## 2018-08-16 NOTE — IP AVS SNAPSHOT
MRN:[id number]                      After Visit Summary   8/16/2018    Jose Lopez    MRN: [id number]           Thank you!     Thank you for choosing [facility] for your care. Our goal is always to provide you with excellent care. Hearing back from our patients is one way we can continue to improve our services. Please take a few minutes to complete the written survey that you may receive in the mail after you visit with us. Thank you!        Patient Information     Date Of Birth          1960        About your hospital stay     You were admitted on:  August 17, 2018 You last received care in the:  [place] [address] [facility]    You were discharged on:  August 19, 2018        Reason for your hospital stay       You were admitted for confusion and spasticity, suspected secondary to urinary tract infection.                  Who to Call     For medical emergencies, please call 911.  For non-urgent questions about your medical care, please call your primary care provider or clinic, [phone]          Attending Provider     Provider Specialty    Taye Guidry MD Emergency Medicine    [facility], Erlin Coon MD Internal Medicine       Primary Care Provider Office Phone # Fax #    Mpho Prakash Hollis [phone]       When to contact your care team       Call your primary doctor if you have any of the following: persistent, watery diarrhea or new abdominal pain within 2 weeks of completing course of antibiotics.                  After Care Instructions     Activity       Your activity upon discharge: activity as tolerated            Diet       Follow this diet upon discharge:       Regular Diet Adult            Discharge Instructions       Continue hydrocortisone suppositories daily until seen by PCP (you may stop these if rectal pain has improved).                  Follow-up Appointments     Follow-up and recommended labs and tests        Follow up with primary care  "provider, Mpho Prakash Hollis, within 7 days for hospital follow- up.  No follow up labs or test are needed.                  Your next 10 appointments already scheduled     Oct 03, 2018  9:40 AM CDT   (Arrive by 9:25 AM)   Return Visit with Jose Belle MD   [facility] ([facility] [place])    [address]  3rd Floor  [facility] [address] [phone]              Pending Results     Date and Time Order Name Status Description    8/16/2018 2250 Urine Culture Aerobic Bacterial Preliminary             Statement of Approval     Ordered          08/19/18 0837  I have reviewed and agree with all the recommendations and orders detailed in this document.  EFFECTIVE NOW     Approved and electronically signed by:  Kamari Murray MD             Admission Information     Date & Time Provider Department Dept. Phone    8/16/2018 Erlin Adams MD [place] [address] [facility] [phone]      Your Vitals Were     Blood Pressure Pulse Temperature Respirations Height Weight    148/79 (BP Location: Right arm) 93 98.4  F (36.9  C) (Oral) 18 1.778 m (5' 10\") 75.3 kg (166 lb 0.1 oz)    Pulse Oximetry BMI (Body Mass Index)                95% 23.82 kg/m2          [employer] Information     [employer] gives you secure access to your electronic health record. If you see a primary care provider, you can also send messages to your care team and make appointments. If you have questions, please call your primary care clinic.  If you do not have a primary care provider, please call [phone] and they will assist you.        Care EveryWhere ID     This is your Care EveryWhere ID. This could be used by other organizations to access your [facility] medical records  [id number]        Equal Access to Services     JAYLEN DOUGLASS AH: Grayson Gill, tracy norton, forrest urbina. So wa " [phone].    ATENCIÓN: Si cristi riggins, tiene a reyes disposición servicios gratuitos de asistencia lingüística. Kristi jackson [phone].    We comply with applicable federal civil rights laws and [place] laws. We do not discriminate on the basis of race, color, national origin, age, disability, sex, sexual orientation, or gender identity.               Review of your medicines      START taking        Dose / Directions    sulfamethoxazole-trimethoprim 800-160 MG per tablet   Commonly known as:  BACTRIM DS/SEPTRA DS   Used for:  Urinary tract infection without hematuria, site unspecified        Dose:  1 tablet   Start taking on:  8/20/2018   Take 1 tablet by mouth 2 times daily for 4 days   Quantity:  8 tablet   Refills:  0         CONTINUE these medicines which have NOT CHANGED        Dose / Directions    baclofen 20 MG tablet   Commonly known as:  LIORESAL        Dose:  30 mg   Take 30 mg by mouth 4 times daily At 0900, 1100, 1500, 2100.   Refills:  0       docusate sodium 100 MG capsule   Commonly known as:  COLACE        Dose:  200 mg   Take 200 mg by mouth 2 times daily   Refills:  0       hydrocortisone 25 MG Suppository   Commonly known as:  ANUSOL-HC        Dose:  25 mg   Place 25 mg rectally 2 times daily as needed for hemorrhoids   Refills:  0       hyoscyamine 0.125 MG sublingual tablet   Commonly known as:  LEVSIN/SL        Dose:  0.125 mg   Place 0.125 mg under the tongue every 4 hours as needed for cramping   Refills:  0       multivitamin, therapeutic with minerals Tabs tablet        Dose:  1 tablet   Take 1 tablet by mouth daily   Refills:  0       omeprazole 20 MG CR capsule   Commonly known as:  priLOSEC        Dose:  20 mg   Take 20 mg by mouth daily   Refills:  3       oxybutynin 5 MG tablet   Commonly known as:  DITROPAN        Dose:  5 mg   Take 5 mg by mouth 4 times daily At 0900, 1100, 1500, 2100.   Refills:  0       tiZANidine 2 MG tablet   Commonly known as:  ZANAFLEX   Used for:  Muscle  spasticity        Dose:  2-4 mg   Take 1-2 tablets (2-4 mg) by mouth 3 times daily   Quantity:  180 tablet   Refills:  6       VIAGRA 100 MG tablet   Generic drug:  sildenafil        Dose:  100 mg   Take 100 mg by mouth as needed   Refills:  3            Where to get your medicines      These medications were sent to [facility] - [address]  [address], [place] [address]     Phone:  [phone]     sulfamethoxazole-trimethoprim 800-160 MG per tablet                Protect others around you: Learn how to safely use, store and throw away your medicines at www.disposemymeds.org.        ANTIBIOTIC INSTRUCTION     You've Been Prescribed an Antibiotic - Now What?  Your healthcare team thinks that you or your loved one might have an infection. Some infections can be treated with antibiotics, which are powerful, life-saving drugs. Like all medications, antibiotics have side effects and should only be used when necessary. There are some important things you should know about your antibiotic treatment.      Your healthcare team may run tests before you start taking an antibiotic.    Your team may take samples (e.g., from your blood, urine or other areas) to run tests to look for bacteria. These test can be important to determine if you need an antibiotic at all and, if you do, which antibiotic will work best.      Within a few days, your healthcare team might change or even stop your antibiotic.    Your team may start you on an antibiotic while they are working to find out what is making you sick.    Your team might change your antibiotic because test results show that a different antibiotic would be better to treat your infection.    In some cases, once your team has more information, they learn that you do not need an antibiotic at all. They may find out that you don't have an infection, or that the antibiotic you're taking won't work against your infection. For example, an  infection caused by a virus can't be treated with antibiotics. Staying on an antibiotic when you don't need it is more likely to be harmful than helpful.      You may experience side effects from your antibiotic.    Like all medications, antibiotics have side effects. Some of these can be serious.    Let you healthcare team know if you have any known allergies when you are admitted to the hospital.    One significant side effect of nearly all antibiotics is the risk of severe and sometimes deadly diarrhea caused by Clostridium difficile (C. Difficile). This occurs when a person takes antibiotics because some good germs are destroyed. Antibiotic use allows C. diificile to take over, putting patients at high risk for this serious infection.    As a patient or caregiver, it is important to understand your or your loved one's antibiotic treatment. It is especially important for caregivers to speak up when patients can't speak for themselves. Here are some important questions to ask your healthcare team.    What infection is this antibiotic treating and how do you know I have that infection?    What side effects might occur from this antibiotic?    How long will I need to take this antibiotic?    Is it safe to take this antibiotic with other medications or supplements (e.g., vitamins) that I am taking?     Are there any special directions I need to know about taking this antibiotic? For example, should I take it with food?    How will I be monitored to know whether my infection is responding to the antibiotic?    What tests may help to make sure the right antibiotic is prescribed for me?      Information provided by:  www.cdc.gov/getsmart  U.S. Department of Health and Human Services  Centers for disease Control and Prevention  National Center for Emerging and Zoonotic Infectious Diseases  Division of Healthcare Quality Promotion             Medication List: This is a list of all your medications and when to take them.  Check marks below indicate your daily home schedule. Keep this list as a reference.      Medications           Morning Afternoon Evening Bedtime As Needed    baclofen 20 MG tablet   Commonly known as:  LIORESAL   Take 30 mg by mouth 4 times daily At 0900, 1100, 1500, 2100.   Last time this was given:  30 mg on 8/19/2018  9:29 AM   Next Dose Due:  TODAY 8/19/18 11 am                                   docusate sodium 100 MG capsule   Commonly known as:  COLACE   Take 200 mg by mouth 2 times daily   Last time this was given:  100 mg on 8/19/2018  9:30 AM   Next Dose Due:  Tonight 8/19/18                                   hydrocortisone 25 MG Suppository   Commonly known as:  ANUSOL-HC   Place 25 mg rectally 2 times daily as needed for hemorrhoids   Last time this was given:  25 mg on 8/18/2018 10:04 PM                                   hyoscyamine 0.125 MG sublingual tablet   Commonly known as:  LEVSIN/SL   Place 0.125 mg under the tongue every 4 hours as needed for cramping   Last time this was given:  125 mcg on 8/19/2018  2:15 AM                                   multivitamin, therapeutic with minerals Tabs tablet   Take 1 tablet by mouth daily   Last time this was given:  1 tablet on 8/19/2018  9:30 AM   Next Dose Due:  Tomorrow 8/20/18 morning                                   omeprazole 20 MG CR capsule   Commonly known as:  priLOSEC   Take 20 mg by mouth daily   Last time this was given:  20 mg on 8/19/2018  9:30 AM   Next Dose Due:  Tomorrow 8/20/18 morning                                   oxybutynin 5 MG tablet   Commonly known as:  DITROPAN   Take 5 mg by mouth 4 times daily At 0900, 1100, 1500, 2100.   Last time this was given:  5 mg on 8/19/2018  9:29 AM   Next Dose Due:  Today 8/19/18 11 am                                   sulfamethoxazole-trimethoprim 800-160 MG per tablet   Commonly known as:  BACTRIM DS/SEPTRA DS   Take 1 tablet by mouth 2 times daily for 4 days   Start taking on:  8/20/2018                                       tiZANidine 2 MG tablet   Commonly known as:  ZANAFLEX   Take 1-2 tablets (2-4 mg) by mouth 3 times daily   Last time this was given:  6 mg on 8/19/2018  9:29 AM   Next Dose Due:  Today 8/19/18 afternoon                                   VIAGRA 100 MG tablet   Take 100 mg by mouth as needed   Generic drug:  sildenafil

## 2018-08-16 NOTE — IP AVS SNAPSHOT
[address], [address]    [place] [address]    Phone:  [phone]                                       After Visit Summary   8/16/2018    Jose Lopez    MRN: [id number]           After Visit Summary Signature Page     I have received my discharge instructions, and my questions have been answered. I have discussed any challenges I see with this plan with the nurse or doctor.    ..........................................................................................................................................  Patient/Patient Representative Signature      ..........................................................................................................................................  Patient Representative Print Name and Relationship to Patient    ..................................................               ................................................  Date                                            Time    ..........................................................................................................................................  Reviewed by Signature/Title    ...................................................              ..............................................  Date                                                            Time

## 2018-08-17 PROBLEM — N39.0 UTI (URINARY TRACT INFECTION): Status: ACTIVE | Noted: 2018-08-17

## 2018-08-17 PROBLEM — N39.0 URINARY TRACT INFECTION: Status: ACTIVE | Noted: 2018-08-17

## 2018-08-17 LAB
MAGNESIUM SERPL-MCNC: 2.2 MG/DL (ref 1.6–2.3)
POTASSIUM SERPL-SCNC: 3.8 MMOL/L (ref 3.4–5.3)

## 2018-08-17 PROCEDURE — G0378 HOSPITAL OBSERVATION PER HR: HCPCS

## 2018-08-17 PROCEDURE — A9270 NON-COVERED ITEM OR SERVICE: HCPCS | Mod: GY | Performed by: HOSPITALIST

## 2018-08-17 PROCEDURE — 96365 THER/PROPH/DIAG IV INF INIT: CPT

## 2018-08-17 PROCEDURE — 99207 ZZC APP CREDIT; MD BILLING SHARED VISIT: CPT | Performed by: INTERNAL MEDICINE

## 2018-08-17 PROCEDURE — 99220 ZZC INITIAL OBSERVATION CARE,LEVL III: CPT | Performed by: HOSPITALIST

## 2018-08-17 PROCEDURE — 25000132 ZZH RX MED GY IP 250 OP 250 PS 637: Mod: GY | Performed by: INTERNAL MEDICINE

## 2018-08-17 PROCEDURE — 36415 COLL VENOUS BLD VENIPUNCTURE: CPT | Performed by: INTERNAL MEDICINE

## 2018-08-17 PROCEDURE — 25000132 ZZH RX MED GY IP 250 OP 250 PS 637: Mod: GY | Performed by: HOSPITALIST

## 2018-08-17 PROCEDURE — 25000128 H RX IP 250 OP 636: Performed by: EMERGENCY MEDICINE

## 2018-08-17 PROCEDURE — A9270 NON-COVERED ITEM OR SERVICE: HCPCS | Mod: GY | Performed by: INTERNAL MEDICINE

## 2018-08-17 PROCEDURE — 25000132 ZZH RX MED GY IP 250 OP 250 PS 637: Performed by: EMERGENCY MEDICINE

## 2018-08-17 PROCEDURE — 84132 ASSAY OF SERUM POTASSIUM: CPT | Performed by: INTERNAL MEDICINE

## 2018-08-17 PROCEDURE — A9270 NON-COVERED ITEM OR SERVICE: HCPCS | Mod: GY | Performed by: EMERGENCY MEDICINE

## 2018-08-17 RX ORDER — ONDANSETRON 4 MG/1
4 TABLET, ORALLY DISINTEGRATING ORAL EVERY 6 HOURS PRN
Status: DISCONTINUED | OUTPATIENT
Start: 2018-08-17 | End: 2018-08-19 | Stop reason: HOSPADM

## 2018-08-17 RX ORDER — BISACODYL 10 MG
10 SUPPOSITORY, RECTAL RECTAL DAILY PRN
Status: DISCONTINUED | OUTPATIENT
Start: 2018-08-17 | End: 2018-08-19 | Stop reason: HOSPADM

## 2018-08-17 RX ORDER — CEFTRIAXONE 1 G/1
1 INJECTION, POWDER, FOR SOLUTION INTRAMUSCULAR; INTRAVENOUS ONCE
Status: COMPLETED | OUTPATIENT
Start: 2018-08-17 | End: 2018-08-17

## 2018-08-17 RX ORDER — POTASSIUM CL/LIDO/0.9 % NACL 10MEQ/0.1L
10 INTRAVENOUS SOLUTION, PIGGYBACK (ML) INTRAVENOUS
Status: DISCONTINUED | OUTPATIENT
Start: 2018-08-17 | End: 2018-08-19 | Stop reason: HOSPADM

## 2018-08-17 RX ORDER — ACETAMINOPHEN 650 MG/1
650 SUPPOSITORY RECTAL EVERY 4 HOURS PRN
Status: DISCONTINUED | OUTPATIENT
Start: 2018-08-17 | End: 2018-08-19 | Stop reason: HOSPADM

## 2018-08-17 RX ORDER — MULTIPLE VITAMINS W/ MINERALS TAB 9MG-400MCG
1 TAB ORAL DAILY
Status: DISCONTINUED | OUTPATIENT
Start: 2018-08-17 | End: 2018-08-19 | Stop reason: HOSPADM

## 2018-08-17 RX ORDER — HYDROCORTISONE ACETATE 25 MG/1
25 SUPPOSITORY RECTAL 2 TIMES DAILY PRN
Status: DISCONTINUED | OUTPATIENT
Start: 2018-08-17 | End: 2018-08-19 | Stop reason: HOSPADM

## 2018-08-17 RX ORDER — POTASSIUM CHLORIDE 1.5 G/1.58G
40 POWDER, FOR SOLUTION ORAL ONCE
Status: DISCONTINUED | OUTPATIENT
Start: 2018-08-17 | End: 2018-08-17 | Stop reason: ALTCHOICE

## 2018-08-17 RX ORDER — DOCUSATE SODIUM 100 MG/1
200 CAPSULE, LIQUID FILLED ORAL 2 TIMES DAILY
Status: DISCONTINUED | OUTPATIENT
Start: 2018-08-17 | End: 2018-08-19 | Stop reason: HOSPADM

## 2018-08-17 RX ORDER — POTASSIUM CHLORIDE 29.8 MG/ML
20 INJECTION INTRAVENOUS
Status: DISCONTINUED | OUTPATIENT
Start: 2018-08-17 | End: 2018-08-19 | Stop reason: HOSPADM

## 2018-08-17 RX ORDER — POTASSIUM CHLORIDE 7.45 MG/ML
10 INJECTION INTRAVENOUS
Status: DISCONTINUED | OUTPATIENT
Start: 2018-08-17 | End: 2018-08-19 | Stop reason: HOSPADM

## 2018-08-17 RX ORDER — TIZANIDINE 2 MG/1
2-4 TABLET ORAL 3 TIMES DAILY
Status: DISCONTINUED | OUTPATIENT
Start: 2018-08-17 | End: 2018-08-18

## 2018-08-17 RX ORDER — ONDANSETRON 2 MG/ML
4 INJECTION INTRAMUSCULAR; INTRAVENOUS EVERY 6 HOURS PRN
Status: DISCONTINUED | OUTPATIENT
Start: 2018-08-17 | End: 2018-08-19 | Stop reason: HOSPADM

## 2018-08-17 RX ORDER — BACLOFEN 10 MG/1
30 TABLET ORAL 4 TIMES DAILY
Status: DISCONTINUED | OUTPATIENT
Start: 2018-08-17 | End: 2018-08-19 | Stop reason: HOSPADM

## 2018-08-17 RX ORDER — ACETAMINOPHEN 325 MG/1
650 TABLET ORAL EVERY 4 HOURS PRN
Status: DISCONTINUED | OUTPATIENT
Start: 2018-08-17 | End: 2018-08-19 | Stop reason: HOSPADM

## 2018-08-17 RX ORDER — POTASSIUM CHLORIDE 1.5 G/1.58G
20-40 POWDER, FOR SOLUTION ORAL
Status: DISCONTINUED | OUTPATIENT
Start: 2018-08-17 | End: 2018-08-19 | Stop reason: HOSPADM

## 2018-08-17 RX ORDER — OXYBUTYNIN CHLORIDE 5 MG/1
5 TABLET ORAL 4 TIMES DAILY
Status: DISCONTINUED | OUTPATIENT
Start: 2018-08-17 | End: 2018-08-19 | Stop reason: HOSPADM

## 2018-08-17 RX ORDER — DOCUSATE SODIUM 100 MG/1
200 CAPSULE, LIQUID FILLED ORAL 2 TIMES DAILY PRN
Status: ON HOLD | COMMUNITY
End: 2023-05-14

## 2018-08-17 RX ORDER — CEFTRIAXONE 1 G/1
1 INJECTION, POWDER, FOR SOLUTION INTRAMUSCULAR; INTRAVENOUS EVERY 24 HOURS
Status: DISCONTINUED | OUTPATIENT
Start: 2018-08-18 | End: 2018-08-19 | Stop reason: HOSPADM

## 2018-08-17 RX ORDER — NALOXONE HYDROCHLORIDE 0.4 MG/ML
.1-.4 INJECTION, SOLUTION INTRAMUSCULAR; INTRAVENOUS; SUBCUTANEOUS
Status: DISCONTINUED | OUTPATIENT
Start: 2018-08-17 | End: 2018-08-19 | Stop reason: HOSPADM

## 2018-08-17 RX ORDER — AMOXICILLIN 250 MG
2 CAPSULE ORAL 2 TIMES DAILY PRN
Status: DISCONTINUED | OUTPATIENT
Start: 2018-08-17 | End: 2018-08-19 | Stop reason: HOSPADM

## 2018-08-17 RX ORDER — MULTIPLE VITAMINS W/ MINERALS TAB 9MG-400MCG
1 TAB ORAL DAILY
Status: ON HOLD | COMMUNITY
End: 2023-05-14

## 2018-08-17 RX ORDER — HYDROCORTISONE ACETATE 25 MG/1
25 SUPPOSITORY RECTAL 2 TIMES DAILY PRN
Status: ON HOLD | COMMUNITY
End: 2023-05-14

## 2018-08-17 RX ORDER — POTASSIUM CHLORIDE 1500 MG/1
20-40 TABLET, EXTENDED RELEASE ORAL
Status: DISCONTINUED | OUTPATIENT
Start: 2018-08-17 | End: 2018-08-19 | Stop reason: HOSPADM

## 2018-08-17 RX ORDER — OXYCODONE AND ACETAMINOPHEN 5; 325 MG/1; MG/1
1 TABLET ORAL EVERY 4 HOURS PRN
Status: COMPLETED | OUTPATIENT
Start: 2018-08-17 | End: 2018-08-18

## 2018-08-17 RX ORDER — AMOXICILLIN 250 MG
1 CAPSULE ORAL 2 TIMES DAILY PRN
Status: DISCONTINUED | OUTPATIENT
Start: 2018-08-17 | End: 2018-08-19 | Stop reason: HOSPADM

## 2018-08-17 RX ORDER — POTASSIUM CHLORIDE 1500 MG/1
40 TABLET, EXTENDED RELEASE ORAL ONCE
Status: COMPLETED | OUTPATIENT
Start: 2018-08-17 | End: 2018-08-17

## 2018-08-17 RX ADMIN — BACLOFEN 30 MG: 10 TABLET ORAL at 15:58

## 2018-08-17 RX ADMIN — TIZANIDINE 2 MG: 2 TABLET ORAL at 11:25

## 2018-08-17 RX ADMIN — POTASSIUM CHLORIDE 40 MEQ: 1500 TABLET, EXTENDED RELEASE ORAL at 00:49

## 2018-08-17 RX ADMIN — BACLOFEN 30 MG: 10 TABLET ORAL at 11:25

## 2018-08-17 RX ADMIN — OMEPRAZOLE 20 MG: 20 CAPSULE, DELAYED RELEASE ORAL at 11:25

## 2018-08-17 RX ADMIN — OXYBUTYNIN CHLORIDE 5 MG: 5 TABLET ORAL at 00:22

## 2018-08-17 RX ADMIN — BACLOFEN 30 MG: 10 TABLET ORAL at 21:24

## 2018-08-17 RX ADMIN — BISACODYL 10 MG: 10 SUPPOSITORY RECTAL at 20:21

## 2018-08-17 RX ADMIN — MULTIPLE VITAMINS W/ MINERALS TAB 1 TABLET: TAB at 11:25

## 2018-08-17 RX ADMIN — SENNOSIDES AND DOCUSATE SODIUM 2 TABLET: 8.6; 5 TABLET ORAL at 21:24

## 2018-08-17 RX ADMIN — TIZANIDINE 4 MG: 2 TABLET ORAL at 21:39

## 2018-08-17 RX ADMIN — TIZANIDINE 4 MG: 2 TABLET ORAL at 15:58

## 2018-08-17 RX ADMIN — ACETAMINOPHEN 650 MG: 325 TABLET, FILM COATED ORAL at 13:46

## 2018-08-17 RX ADMIN — CEFTRIAXONE SODIUM 1 G: 1 INJECTION, POWDER, FOR SOLUTION INTRAMUSCULAR; INTRAVENOUS at 00:21

## 2018-08-17 RX ADMIN — OXYBUTYNIN CHLORIDE 5 MG: 5 TABLET ORAL at 15:58

## 2018-08-17 RX ADMIN — OXYBUTYNIN CHLORIDE 5 MG: 5 TABLET ORAL at 11:25

## 2018-08-17 RX ADMIN — OXYBUTYNIN CHLORIDE 5 MG: 5 TABLET ORAL at 21:24

## 2018-08-17 ASSESSMENT — ENCOUNTER SYMPTOMS
ABDOMINAL DISTENTION: 0
CONFUSION: 1

## 2018-08-17 NOTE — ED NOTES
Pt very agitated. Yelling at (girl)friend in room to go get him a battery for his phone and to leave if she is not going to help him. Tells nurse she is not his girlfriend. She only lives with him now because she lost everything of her own. Were previously in a relationship. Refuses potassium after first drink. Wants pills instead. Will notify MD. Pt does not make sense with everything he says, does not seem to comprehend reasoning or explanations such as why friend should stay to talk with hospitalist.
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
## 2018-08-17 NOTE — ED NOTES
Bed: ST01  Expected date:   Expected time:   Means of arrival:   Comments:  536 stroke 58m last seen 6 hours ago

## 2018-08-17 NOTE — PHARMACY-ADMISSION MEDICATION HISTORY
Admission medication history interview status for the 8/16/2018  admission is complete. See EPIC admission navigator for prior to admission medications     Medication history source reliability:Moderate    Actions taken by pharmacist (provider contacted, etc):   Interviewed patient using [facility] med list, reviewed  [facility] Everywhere information for recent med changes.     Additional medication history information not noted on PTA med list :    1. He recently stopped taking clonazepam.    2. Pt has a prescription for  tramadol 50mg 1-2 tabs q8hrs prn but he does not use it.     3. He also received a Norco prescription recently from urgent care but he states it made him break out on his forehead so he doesn't want to use it anymore.     4. Pt has a prescription written in May for lidocaine 3%-nifedipine 0.5% cream 1 gram (1/4 tsp) applied topically BID for anal fissure. He rarely uses this but does have it available.     Medication reconciliation/reorder completed by provider prior to medication history? No    Time spent in this activity: 20 minutes    Prior to Admission medications    Medication Sig Last Dose Taking? Auth Provider   baclofen (LIORESAL) 20 MG tablet Take 30 mg by mouth 4 times daily At 0900, 1100, 1500, 2100. 8/16/2018 at AM Yes Reported, Patient   docusate sodium (COLACE) 100 MG capsule Take 200 mg by mouth 2 times daily 8/16/2018 at Unknown time Yes Unknown, Entered By History   hydrocortisone (ANUSOL-HC) 25 MG Suppository Place 25 mg rectally 2 times daily as needed for hemorrhoids Past Week at Unknown time Yes Unknown, Entered By History   hyoscyamine (LEVSIN/SL) 0.125 MG sublingual tablet Place 0.125 mg under the tongue every 4 hours as needed for cramping 8/16/2018 at Unknown time Yes Unknown, Entered By History   multivitamin, therapeutic with minerals (THERA-VIT-M) TABS tablet Take 1 tablet by mouth daily 8/16/2018 at Unknown time Yes Unknown, Entered By History   omeprazole  (PRILOSEC) 20 MG CR capsule Take 20 mg by mouth daily  8/16/2018 at AM Yes Reported, Patient   oxybutynin (DITROPAN) 5 MG tablet Take 5 mg by mouth 4 times daily At 0900, 1100, 1500, 2100. 8/16/2018 at AM Yes Reported, Patient   tiZANidine (ZANAFLEX) 2 MG tablet Take 1-2 tablets (2-4 mg) by mouth 3 times daily 8/16/2018 at AM Yes Jose Belle MD   VIAGRA 100 MG tablet Take 100 mg by mouth as needed More than a month at Unknown time  Reported, Patient

## 2018-08-17 NOTE — PLAN OF CARE
Problem: Patient Care Overview  Goal: Plan of Care/Patient Progress Review  Outcome: No Change  Pt is A&O, confused. VSS on RA, afebrile. Denies pain. IV infusing. Wheel chair bound. [place] catheter in place. Will continue to monitor.
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
## 2018-08-17 NOTE — PLAN OF CARE
"Problem: Patient Care Overview  Goal: Plan of Care/Patient Progress Review  Outcome: No Change  A/Ox3 ex at times seems to have difficulty tracking situation, repeating questions and changing topics quickly. VSS ex -170s, O2sats 97% on RA. C/o bladder spasms, \"burning,\" with pain shooting down legs. States legs are usually rigid but currently more flaccid. Paged MD to restart home meds, given to pt without improvement in spasms. [place] in place, care completed, pt requesting to remove, will discuss with MD. Plan to continue IV abx.       "
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
## 2018-08-17 NOTE — ED PROVIDER NOTES
RECEIVING UNIT ED HANDOFF REVIEW    ED Nurse Handoff Report was reviewed by: Bladimir Florentino on August 17, 2018 at 2:04 AM

## 2018-08-18 LAB
ANION GAP SERPL CALCULATED.3IONS-SCNC: 12 MMOL/L (ref 3–14)
BUN SERPL-MCNC: 26 MG/DL (ref 7–30)
CALCIUM SERPL-MCNC: 8.7 MG/DL (ref 8.5–10.1)
CHLORIDE SERPL-SCNC: 103 MMOL/L (ref 94–109)
CO2 SERPL-SCNC: 23 MMOL/L (ref 20–32)
CREAT SERPL-MCNC: 0.55 MG/DL (ref 0.66–1.25)
GFR SERPL CREATININE-BSD FRML MDRD: >90 ML/MIN/1.7M2
GLUCOSE SERPL-MCNC: 96 MG/DL (ref 70–99)
POTASSIUM SERPL-SCNC: 3.3 MMOL/L (ref 3.4–5.3)
POTASSIUM SERPL-SCNC: 3.8 MMOL/L (ref 3.4–5.3)
SODIUM SERPL-SCNC: 138 MMOL/L (ref 133–144)

## 2018-08-18 PROCEDURE — 25000132 ZZH RX MED GY IP 250 OP 250 PS 637: Mod: GY | Performed by: HOSPITALIST

## 2018-08-18 PROCEDURE — 80048 BASIC METABOLIC PNL TOTAL CA: CPT | Performed by: HOSPITALIST

## 2018-08-18 PROCEDURE — A9270 NON-COVERED ITEM OR SERVICE: HCPCS | Mod: GY | Performed by: HOSPITALIST

## 2018-08-18 PROCEDURE — 25000128 H RX IP 250 OP 636: Performed by: HOSPITALIST

## 2018-08-18 PROCEDURE — G0378 HOSPITAL OBSERVATION PER HR: HCPCS

## 2018-08-18 PROCEDURE — 84132 ASSAY OF SERUM POTASSIUM: CPT | Performed by: HOSPITALIST

## 2018-08-18 PROCEDURE — 25000132 ZZH RX MED GY IP 250 OP 250 PS 637: Mod: GY | Performed by: INTERNAL MEDICINE

## 2018-08-18 PROCEDURE — A9270 NON-COVERED ITEM OR SERVICE: HCPCS | Mod: GY | Performed by: INTERNAL MEDICINE

## 2018-08-18 PROCEDURE — 99225 ZZC SUBSEQUENT OBSERVATION CARE,LEVEL II: CPT | Performed by: HOSPITALIST

## 2018-08-18 PROCEDURE — 36415 COLL VENOUS BLD VENIPUNCTURE: CPT | Performed by: HOSPITALIST

## 2018-08-18 PROCEDURE — 96376 TX/PRO/DX INJ SAME DRUG ADON: CPT

## 2018-08-18 RX ORDER — TIZANIDINE 2 MG/1
4-6 TABLET ORAL 3 TIMES DAILY
Status: DISCONTINUED | OUTPATIENT
Start: 2018-08-18 | End: 2018-08-19 | Stop reason: HOSPADM

## 2018-08-18 RX ORDER — LOPERAMIDE HCL 2 MG
2 CAPSULE ORAL 4 TIMES DAILY PRN
Status: DISCONTINUED | OUTPATIENT
Start: 2018-08-18 | End: 2018-08-19 | Stop reason: HOSPADM

## 2018-08-18 RX ORDER — HYDROCORTISONE ACETATE 25 MG/1
25 SUPPOSITORY RECTAL ONCE
Status: COMPLETED | OUTPATIENT
Start: 2018-08-18 | End: 2018-08-18

## 2018-08-18 RX ADMIN — LOPERAMIDE HYDROCHLORIDE 2 MG: 2 CAPSULE ORAL at 14:40

## 2018-08-18 RX ADMIN — CEFTRIAXONE SODIUM 1 G: 1 INJECTION, POWDER, FOR SOLUTION INTRAMUSCULAR; INTRAVENOUS at 00:01

## 2018-08-18 RX ADMIN — OXYBUTYNIN CHLORIDE 5 MG: 5 TABLET ORAL at 14:14

## 2018-08-18 RX ADMIN — BACLOFEN 30 MG: 10 TABLET ORAL at 20:53

## 2018-08-18 RX ADMIN — TIZANIDINE 4 MG: 2 TABLET ORAL at 08:47

## 2018-08-18 RX ADMIN — OXYCODONE HYDROCHLORIDE AND ACETAMINOPHEN 1 TABLET: 5; 325 TABLET ORAL at 03:43

## 2018-08-18 RX ADMIN — OXYBUTYNIN CHLORIDE 5 MG: 5 TABLET ORAL at 08:48

## 2018-08-18 RX ADMIN — SENNOSIDES AND DOCUSATE SODIUM 1 TABLET: 8.6; 5 TABLET ORAL at 11:12

## 2018-08-18 RX ADMIN — BACLOFEN 30 MG: 10 TABLET ORAL at 08:47

## 2018-08-18 RX ADMIN — DOCUSATE SODIUM 200 MG: 100 CAPSULE, LIQUID FILLED ORAL at 22:04

## 2018-08-18 RX ADMIN — BACLOFEN 30 MG: 10 TABLET ORAL at 14:14

## 2018-08-18 RX ADMIN — POLYETHYLENE GLYCOL 400 AND PROPYLENE GLYCOL 1 DROP: 4; 3 SOLUTION/ DROPS OPHTHALMIC at 11:12

## 2018-08-18 RX ADMIN — HYDROCORTISONE ACETATE 25 MG: 25 SUPPOSITORY RECTAL at 22:04

## 2018-08-18 RX ADMIN — HYDROCORTISONE ACETATE 25 MG: 25 SUPPOSITORY RECTAL at 14:15

## 2018-08-18 RX ADMIN — OXYBUTYNIN CHLORIDE 5 MG: 5 TABLET ORAL at 20:53

## 2018-08-18 RX ADMIN — POTASSIUM CHLORIDE 40 MEQ: 1500 TABLET, EXTENDED RELEASE ORAL at 08:53

## 2018-08-18 RX ADMIN — OMEPRAZOLE 20 MG: 20 CAPSULE, DELAYED RELEASE ORAL at 08:47

## 2018-08-18 RX ADMIN — BACLOFEN 30 MG: 10 TABLET ORAL at 10:43

## 2018-08-18 RX ADMIN — TIZANIDINE 6 MG: 2 TABLET ORAL at 20:53

## 2018-08-18 RX ADMIN — TIZANIDINE 6 MG: 2 TABLET ORAL at 15:00

## 2018-08-18 RX ADMIN — OXYCODONE HYDROCHLORIDE AND ACETAMINOPHEN 1 TABLET: 5; 325 TABLET ORAL at 00:01

## 2018-08-18 RX ADMIN — POTASSIUM CHLORIDE 20 MEQ: 1500 TABLET, EXTENDED RELEASE ORAL at 11:12

## 2018-08-18 RX ADMIN — MULTIPLE VITAMINS W/ MINERALS TAB 1 TABLET: TAB at 08:47

## 2018-08-18 RX ADMIN — OXYBUTYNIN CHLORIDE 5 MG: 5 TABLET ORAL at 10:44

## 2018-08-18 ASSESSMENT — PAIN DESCRIPTION - DESCRIPTORS
DESCRIPTORS: CONSTANT;DISCOMFORT
DESCRIPTORS: BURNING

## 2018-08-18 NOTE — PLAN OF CARE
Problem: Patient Care Overview  Goal: Plan of Care/Patient Progress Review  Patient A&O, makes repetitive statements. Anxious at times. C/o pain in rectum d/t frequent loose stools after suppository. Stated pain is 10/10 and usally take percocet at home per patient statement. Percocet given with some relief (see MD notification). Patient straight cath for 300 ML's. Self caths at home. Wheel chair bound at baseline.  R PIV SL. Continues on IV abx. Urine cultures pending. Regular diet. Turned and repositioned. Will continue to monitor.

## 2018-08-18 NOTE — PLAN OF CARE
Problem: Patient Care Overview  Goal: Plan of Care/Patient Progress Review  Outcome: No Change  Pt A&Ox4; VSS except for low grade temp. C/o rectal pain; had hydrocortisone suppository at change of shift. Pt had a smear of stool/incontinent however, pt repeatedly requests for digital de-impaction of stool; attempt made but unsuccessful. Buttocks slightly reddened/ barrier cream applied. Pt repositioned on his side. Pt transferring to station 88; report given to receiving and charge RN. All belongings including pt's own toilet seat, and W/c sent with pt. Will continue to monitor.

## 2018-08-18 NOTE — PROVIDER NOTIFICATION
MD Notification    Notified Person: MD    Notified Person Name: Erlin Adams    Notification Date/Time: 8/18/18 0000    Notification Interaction: Phone     Purpose of Notification: Patient in pain d/t frequent loose stools causing spasms, patient stated he takes percocet at home to help with pain     Orders Received: Yes     Comments:
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
## 2018-08-18 NOTE — PLAN OF CARE
Problem: Patient Care Overview  Goal: Plan of Care/Patient Progress Review  alert and oriented. VSS, on room air. Up with lift. Suppository given with results. [place] removed at 1815, 250 ml. Straight cath x 1, 100 ml. Patient is wheel chair bound.

## 2018-08-18 NOTE — PLAN OF CARE
Problem: Patient Care Overview  Goal: Plan of Care/Patient Progress Review  Outcome: No Change  A/Ox4. VSS on RA.  C/o pain in rectum rated 10/10. Digital de-impaction unsuccessful; stool softener & suppository given. Pt refuses enema. Potassium replaced; redraw at 1530.  Patient self straight cath 400 cc. Wheel chair bound at baseline.  R PIV SL. IV abx. Urine cultures pending. Regular diet. T/R q2H. Will continue to monitor.

## 2018-08-19 VITALS
TEMPERATURE: 98.4 F | BODY MASS INDEX: 23.77 KG/M2 | WEIGHT: 166.01 LBS | OXYGEN SATURATION: 95 % | RESPIRATION RATE: 18 BRPM | SYSTOLIC BLOOD PRESSURE: 148 MMHG | DIASTOLIC BLOOD PRESSURE: 79 MMHG | HEIGHT: 70 IN | HEART RATE: 93 BPM

## 2018-08-19 LAB — POTASSIUM SERPL-SCNC: 3.7 MMOL/L (ref 3.4–5.3)

## 2018-08-19 PROCEDURE — 25000132 ZZH RX MED GY IP 250 OP 250 PS 637: Mod: GY | Performed by: INTERNAL MEDICINE

## 2018-08-19 PROCEDURE — 25000128 H RX IP 250 OP 636: Performed by: HOSPITALIST

## 2018-08-19 PROCEDURE — A9270 NON-COVERED ITEM OR SERVICE: HCPCS | Mod: GY | Performed by: INTERNAL MEDICINE

## 2018-08-19 PROCEDURE — 96376 TX/PRO/DX INJ SAME DRUG ADON: CPT

## 2018-08-19 PROCEDURE — 25000132 ZZH RX MED GY IP 250 OP 250 PS 637: Mod: GY | Performed by: HOSPITALIST

## 2018-08-19 PROCEDURE — 99217 ZZC OBSERVATION CARE DISCHARGE: CPT | Performed by: HOSPITALIST

## 2018-08-19 PROCEDURE — A9270 NON-COVERED ITEM OR SERVICE: HCPCS | Mod: GY | Performed by: HOSPITALIST

## 2018-08-19 PROCEDURE — G0378 HOSPITAL OBSERVATION PER HR: HCPCS

## 2018-08-19 PROCEDURE — 84132 ASSAY OF SERUM POTASSIUM: CPT | Performed by: HOSPITALIST

## 2018-08-19 PROCEDURE — 36415 COLL VENOUS BLD VENIPUNCTURE: CPT | Performed by: HOSPITALIST

## 2018-08-19 RX ORDER — SULFAMETHOXAZOLE/TRIMETHOPRIM 800-160 MG
1 TABLET ORAL 2 TIMES DAILY
Qty: 8 TABLET | Refills: 0 | Status: SHIPPED | OUTPATIENT
Start: 2018-08-20 | End: 2018-08-24

## 2018-08-19 RX ADMIN — CEFTRIAXONE SODIUM 1 G: 1 INJECTION, POWDER, FOR SOLUTION INTRAMUSCULAR; INTRAVENOUS at 00:18

## 2018-08-19 RX ADMIN — DEXTRAN 70, AND HYPROMELLOSE 2910 2 DROP: 1; 3 SOLUTION/ DROPS OPHTHALMIC at 00:52

## 2018-08-19 RX ADMIN — OXYBUTYNIN CHLORIDE 5 MG: 5 TABLET ORAL at 09:29

## 2018-08-19 RX ADMIN — TIZANIDINE 6 MG: 2 TABLET ORAL at 09:29

## 2018-08-19 RX ADMIN — ACETAMINOPHEN 650 MG: 325 TABLET, FILM COATED ORAL at 01:27

## 2018-08-19 RX ADMIN — HYOSCYAMINE SULFATE 125 MCG: 0.12 TABLET ORAL at 02:15

## 2018-08-19 RX ADMIN — OMEPRAZOLE 20 MG: 20 CAPSULE, DELAYED RELEASE ORAL at 09:30

## 2018-08-19 RX ADMIN — BACLOFEN 30 MG: 10 TABLET ORAL at 09:29

## 2018-08-19 RX ADMIN — Medication 1 MG: at 01:27

## 2018-08-19 RX ADMIN — MULTIPLE VITAMINS W/ MINERALS TAB 1 TABLET: TAB at 09:30

## 2018-08-19 RX ADMIN — DOCUSATE SODIUM 100 MG: 100 CAPSULE, LIQUID FILLED ORAL at 09:30

## 2018-08-19 ASSESSMENT — PAIN DESCRIPTION - DESCRIPTORS: DESCRIPTORS: SPASM

## 2018-08-19 NOTE — PROGRESS NOTES
Observation goals PRIOR TO DISCHARGE     -diagnostic tests and consults completed and resulted  - partially met  -vital signs normal or at patient baseline  -  met

## 2018-08-19 NOTE — PLAN OF CARE
Problem: Patient Care Overview  Goal: Plan of Care/Patient Progress Review  A/O.  Paraplegic.  VSS.  Pain d/t muscle spasms and hemorrhoids.  Pt frequently stimulates rectum attempting to express stool.  Neurogenic bladder, self caths.  Up with lift, WC bound at baseline.  T/R q2h.  Discharge pending UC results and transition to PO antibiotics.

## 2018-08-19 NOTE — PLAN OF CARE
Problem: Patient Care Overview  Goal: Plan of Care/Patient Progress Review  Outcome: Adequate for Discharge Date Met: 08/19/18  Discharge    Patient discharged to home via WC with friend. VSS on RA. Chronic pain controlled by po meds. PIV removed. Discharge instructions and medications reviewed with patient, verbalizes understanding. Follow up appt reviewed, pt will schedule on his own. Discharge medication given to patient. Pt states home medications were brought in with this visit, unable to locate the container with pt's home meds at this time. Spoke to unit 55 (transfering unit) and security office, unable to locate at this time.

## 2018-08-19 NOTE — DISCHARGE SUMMARY
[facility]    Discharge Summary  Hospitalist    Date of Admission:  8/16/2018  Date of Discharge:  8/19/2018  Discharging Provider: Kamari Murray  Date of Service (when I saw the patient): 08/19/18    Discharge Diagnoses   Probable UTI  Mild hypokalemia  Acute infectious encephalopathy  Paraplegia T9-10  Chronic pain syndrome    History of Present Illness   Jose Lopez is an 58 year old male who presented with confusion and spasticity, suspect secondary to UTI.    Hospital Course   Jose Lopez was admitted on 8/16/2018.  The following problems were addressed during his hospitalization:    Probable UTI.  Admission UA with 19 WBC, large LE and negative nitrites with urine culture growing <10K gram negative rods.  Lack of significant findings may be due to receiving 1-2 doses ciprofloxacin outpatient prior to arrival, and therefore suspect his admission does not represent failure of ciprofloxacin, but will switch to Bactrim to complete course and follow up speciation and sensitivities.       Mild hypokalemia;  Resolved with supplementation.     Acute infectious encephalopathy  Mild confusion on admission, suspect due to UTI.  Has been alert and oriented following admission.      Paraplegia T9-10 with chronic muscle spasticity and neurogenic Bowel and Bladder dysfunction.  Continue PTA baclofen, Levsin, oxybutynin and bowel regimen.  Complained of significant hemorrhoid irritation this admission with rectal spasms, recommend steroid suppository until seen in follow up with PCP.     Chronic pain syndrome.  Continue PTA analgesics     # Discharge Pain Plan:   - Patient currently has NO PAIN and is not being prescribed pain medications on discharge.        Kamari Murray    Significant Results and Procedures   See labs below    Pending Results   These results will be followed up by: Hospitalist service  Unresulted Labs Ordered in the Past 30 Days of this Admission     Date and Time Order Name Status  Description    8/16/2018 2250 Urine Culture Aerobic Bacterial Preliminary           Code Status   Full Code       Primary Care Physician   Sung Hollis    Constitutional: well developed, well nourished male in no acute distress  Respiratory: lungs clear to auscultation bilaterally, no crackles or wheezes, no tachypnea  Cardiovascular: regular rate and rhythm, normal S1/S2, no murmur, rubs or gallops  GI: abdomen soft, non-tender, non-distended, normal bowel sounds  Lymph/Hematologic: No peripheral edema  Musculoskeletal: lower extremities atrophied  Neurologic: alert and oriented x3 and to situation, cranial nerves grossly intact  Psychiatric: normal affect    Discharge Disposition   Discharged to home  Condition at discharge: Stable    Consultations This Hospital Stay   None    Time Spent on this Encounter   I, Kamari Murray, personally saw the patient today and spent less than or equal to 30 minutes discharging this patient.    Discharge Orders     Reason for your hospital stay   You were admitted for confusion and spasticity, suspected secondary to urinary tract infection.     Activity   Your activity upon discharge: activity as tolerated     When to contact your care team   Call your primary doctor if you have any of the following: persistent, watery diarrhea or new abdominal pain within 2 weeks of completing course of antibiotics.     Discharge Instructions   Continue hydrocortisone suppositories daily until seen by PCP (you may stop these if rectal pain has improved).     Follow-up and recommended labs and tests    Follow up with primary care provider, Sung Hollis, within 7 days for hospital follow- up.  No follow up labs or test are needed.     Full Code     Diet   Follow this diet upon discharge:      Regular Diet Adult       Discharge Medications   Current Discharge Medication List      START taking these medications    Details   sulfamethoxazole-trimethoprim (BACTRIM DS/SEPTRA DS) 800-160 MG  per tablet Take 1 tablet by mouth 2 times daily for 4 days  Qty: 8 tablet, Refills: 0    Associated Diagnoses: Urinary tract infection without hematuria, site unspecified         CONTINUE these medications which have NOT CHANGED    Details   baclofen (LIORESAL) 20 MG tablet Take 30 mg by mouth 4 times daily At 0900, 1100, 1500, 2100.      docusate sodium (COLACE) 100 MG capsule Take 200 mg by mouth 2 times daily      hydrocortisone (ANUSOL-HC) 25 MG Suppository Place 25 mg rectally 2 times daily as needed for hemorrhoids      hyoscyamine (LEVSIN/SL) 0.125 MG sublingual tablet Place 0.125 mg under the tongue every 4 hours as needed for cramping      multivitamin, therapeutic with minerals (THERA-VIT-M) TABS tablet Take 1 tablet by mouth daily      omeprazole (PRILOSEC) 20 MG CR capsule Take 20 mg by mouth daily   Refills: 3      oxybutynin (DITROPAN) 5 MG tablet Take 5 mg by mouth 4 times daily At 0900, 1100, 1500, 2100.      tiZANidine (ZANAFLEX) 2 MG tablet Take 1-2 tablets (2-4 mg) by mouth 3 times daily  Qty: 180 tablet, Refills: 6    Associated Diagnoses: Muscle spasticity      VIAGRA 100 MG tablet Take 100 mg by mouth as needed  Refills: 3           Allergies   Allergies   Allergen Reactions     Sertraline      Other reaction(s): Gastrointestinal     Data   Most Recent 3 CBC's:  Recent Labs   Lab Test  08/16/18 2230 04/23/18   0600  04/22/18   1825   WBC  10.7  7.8  6.6   HGB  14.4  13.4  15.9   MCV  82  82  83   PLT  302  256  338      Most Recent 3 BMP's:  Recent Labs   Lab Test  08/19/18   0744  08/18/18   1608  08/18/18   0612   08/16/18 2230 04/23/18   0600   NA   --    --   138   --   140  138   POTASSIUM  3.7  3.8  3.3*   < >  2.9*  3.5   CHLORIDE   --    --   103   --   104  103   CO2   --    --   23   --   21  29   BUN   --    --   26   --   30  14   CR   --    --   0.55*   --   1.06  0.74   ANIONGAP   --    --   12   --   15*  6   MAK   --    --   8.7   --   9.2  8.2*   GLC   --    --   96   --    98  95    < > = values in this interval not displayed.     Most Recent 2 LFT's:  Recent Labs   Lab Test  08/16/18   2230  04/19/18   1609   AST  61*  27   ALT  67  34   ALKPHOS  87  104   BILITOTAL  0.5  0.7     Most Recent INR's and Anticoagulation Dosing History:  Anticoagulation Dose History     Recent Dosing and Labs Latest Ref Rng & Units 8/16/2018    INR 0.86 - 1.14 1.02        Most Recent 6 Bacteria Isolates From Any Culture (See EPIC Reports for Culture Details):  Recent Labs   Lab Test  08/16/18   2250  04/22/18   1915  04/22/18   1825  04/19/18   1810   CULT  <10,000 colonies/mL  Lactose fermenting gram negative rods  Susceptibility testing in progress  *  <10,000 colonies/mL  Yeast  Identification to follow.  *  No growth  No growth  >100,000 colonies/mL  Enterococcus faecalis  *       Results for orders placed or performed during the hospital encounter of 04/22/18   Chest XR,  PA & LAT    Narrative    CHEST TWO VIEW   4/22/2018 7:05 PM     HISTORY: Weak.     COMPARISON: None.    FINDINGS: Tiny calcified granuloma left lung base. Heart size normal.  Right lung clear. Spinal fixation hardware in the mid and lower  thoracic spine noted.      Impression    IMPRESSION: Nothing acute.    NAT FLORES MD

## 2018-08-20 LAB
BACTERIA SPEC CULT: ABNORMAL
BACTERIA SPEC CULT: ABNORMAL
Lab: ABNORMAL
SPECIMEN SOURCE: ABNORMAL

## 2018-10-31 ENCOUNTER — PATIENT OUTREACH (OUTPATIENT)
Dept: CARE COORDINATION | Facility: CLINIC | Age: 58
End: 2018-10-31

## 2018-11-02 ENCOUNTER — TELEPHONE (OUTPATIENT)
Dept: NEUROLOGY | Facility: CLINIC | Age: 58
End: 2018-11-02

## 2018-11-02 NOTE — TELEPHONE ENCOUNTER
[facility] Call Center    Phone Message    May a detailed message be left on voicemail: yes    Reason for Call: Other: Patient cannot make it to today's visit and needs a call to reschedule.      Action Taken: Message routed to:  [facility] ([facility] ARLEY

## 2018-12-03 ENCOUNTER — APPOINTMENT (OUTPATIENT)
Dept: GENERAL RADIOLOGY | Facility: CLINIC | Age: 58
DRG: 092 | End: 2018-12-03
Attending: EMERGENCY MEDICINE
Payer: MEDICARE

## 2018-12-03 ENCOUNTER — HOSPITAL ENCOUNTER (INPATIENT)
Facility: CLINIC | Age: 58
LOS: 1 days | Discharge: HOME | DRG: 092 | End: 2018-12-05
Attending: EMERGENCY MEDICINE | Admitting: HOSPITALIST
Payer: MEDICARE

## 2018-12-03 ENCOUNTER — APPOINTMENT (OUTPATIENT)
Dept: CT IMAGING | Facility: CLINIC | Age: 58
DRG: 092 | End: 2018-12-03
Attending: EMERGENCY MEDICINE
Payer: MEDICARE

## 2018-12-03 DIAGNOSIS — G82.20 PARAPLEGIA (H): ICD-10-CM

## 2018-12-03 DIAGNOSIS — G93.40 ENCEPHALOPATHY: ICD-10-CM

## 2018-12-03 DIAGNOSIS — R82.90 ABNORMAL URINALYSIS: ICD-10-CM

## 2018-12-03 PROBLEM — R41.82 ALTERED MENTAL STATE: Status: ACTIVE | Noted: 2018-12-03

## 2018-12-03 LAB
ALBUMIN SERPL-MCNC: 3.8 G/DL (ref 3.4–5)
ALBUMIN UR-MCNC: 10 MG/DL
ALP SERPL-CCNC: 84 U/L (ref 40–150)
ALT SERPL W P-5'-P-CCNC: 35 U/L (ref 0–70)
AMPHETAMINES UR QL SCN: POSITIVE
ANION GAP SERPL CALCULATED.3IONS-SCNC: 10 MMOL/L (ref 3–14)
APPEARANCE UR: CLEAR
AST SERPL W P-5'-P-CCNC: 24 U/L (ref 0–45)
BACTERIA #/AREA URNS HPF: ABNORMAL /HPF
BARBITURATES UR QL: NEGATIVE
BASOPHILS # BLD AUTO: 0 10E9/L (ref 0–0.2)
BASOPHILS NFR BLD AUTO: 0.4 %
BENZODIAZ UR QL: NEGATIVE
BILIRUB SERPL-MCNC: 0.3 MG/DL (ref 0.2–1.3)
BILIRUB UR QL STRIP: NEGATIVE
BUN SERPL-MCNC: 23 MG/DL (ref 7–30)
CALCIUM SERPL-MCNC: 9 MG/DL (ref 8.5–10.1)
CANNABINOIDS UR QL SCN: POSITIVE
CHLORIDE SERPL-SCNC: 107 MMOL/L (ref 94–109)
CO2 SERPL-SCNC: 22 MMOL/L (ref 20–32)
COCAINE UR QL: NEGATIVE
COLOR UR AUTO: YELLOW
CREAT SERPL-MCNC: 0.76 MG/DL (ref 0.66–1.25)
DIFFERENTIAL METHOD BLD: ABNORMAL
EOSINOPHIL # BLD AUTO: 0.2 10E9/L (ref 0–0.7)
EOSINOPHIL NFR BLD AUTO: 2 %
ERYTHROCYTE [DISTWIDTH] IN BLOOD BY AUTOMATED COUNT: 13 % (ref 10–15)
ETHANOL SERPL-MCNC: <0.01 G/DL
GFR SERPL CREATININE-BSD FRML MDRD: >90 ML/MIN/1.7M2
GLUCOSE SERPL-MCNC: 109 MG/DL (ref 70–99)
GLUCOSE UR STRIP-MCNC: NEGATIVE MG/DL
HCT VFR BLD AUTO: 41.8 % (ref 40–53)
HGB BLD-MCNC: 14.4 G/DL (ref 13.3–17.7)
HGB UR QL STRIP: ABNORMAL
HYALINE CASTS #/AREA URNS LPF: 1 /LPF (ref 0–2)
IMM GRANULOCYTES # BLD: 0 10E9/L (ref 0–0.4)
IMM GRANULOCYTES NFR BLD: 0.2 %
INTERPRETATION ECG - MUSE: NORMAL
KETONES UR STRIP-MCNC: 40 MG/DL
LACTATE SERPL-SCNC: 1.1 MMOL/L (ref 0.4–2)
LEUKOCYTE ESTERASE UR QL STRIP: ABNORMAL
LYMPHOCYTES # BLD AUTO: 1.7 10E9/L (ref 0.8–5.3)
LYMPHOCYTES NFR BLD AUTO: 14.7 %
MCH RBC QN AUTO: 28.6 PG (ref 26.5–33)
MCHC RBC AUTO-ENTMCNC: 34.4 G/DL (ref 31.5–36.5)
MCV RBC AUTO: 83 FL (ref 78–100)
MONOCYTES # BLD AUTO: 1.4 10E9/L (ref 0–1.3)
MONOCYTES NFR BLD AUTO: 12.2 %
MUCOUS THREADS #/AREA URNS LPF: PRESENT /LPF
NEUTROPHILS # BLD AUTO: 7.9 10E9/L (ref 1.6–8.3)
NEUTROPHILS NFR BLD AUTO: 70.5 %
NITRATE UR QL: NEGATIVE
NRBC # BLD AUTO: 0 10*3/UL
NRBC BLD AUTO-RTO: 0 /100
OPIATES UR QL SCN: POSITIVE
PCP UR QL SCN: NEGATIVE
PH UR STRIP: 6 PH (ref 5–7)
PLATELET # BLD AUTO: 301 10E9/L (ref 150–450)
POTASSIUM SERPL-SCNC: 4.1 MMOL/L (ref 3.4–5.3)
PROCALCITONIN SERPL-MCNC: <0.05 NG/ML
PROT SERPL-MCNC: 7.6 G/DL (ref 6.8–8.8)
RBC # BLD AUTO: 5.04 10E12/L (ref 4.4–5.9)
RBC #/AREA URNS AUTO: 1 /HPF (ref 0–2)
SODIUM SERPL-SCNC: 139 MMOL/L (ref 133–144)
SOURCE: ABNORMAL
SP GR UR STRIP: 1.02 (ref 1–1.03)
SQUAMOUS #/AREA URNS AUTO: 2 /HPF (ref 0–1)
UROBILINOGEN UR STRIP-MCNC: NORMAL MG/DL (ref 0–2)
WBC # BLD AUTO: 11.3 10E9/L (ref 4–11)
WBC #/AREA URNS AUTO: 8 /HPF (ref 0–5)

## 2018-12-03 PROCEDURE — 87088 URINE BACTERIA CULTURE: CPT | Performed by: EMERGENCY MEDICINE

## 2018-12-03 PROCEDURE — 83605 ASSAY OF LACTIC ACID: CPT | Performed by: EMERGENCY MEDICINE

## 2018-12-03 PROCEDURE — 80053 COMPREHEN METABOLIC PANEL: CPT | Performed by: EMERGENCY MEDICINE

## 2018-12-03 PROCEDURE — 80307 DRUG TEST PRSMV CHEM ANLYZR: CPT | Performed by: EMERGENCY MEDICINE

## 2018-12-03 PROCEDURE — 96360 HYDRATION IV INFUSION INIT: CPT

## 2018-12-03 PROCEDURE — 80320 DRUG SCREEN QUANTALCOHOLS: CPT | Performed by: EMERGENCY MEDICINE

## 2018-12-03 PROCEDURE — 36415 COLL VENOUS BLD VENIPUNCTURE: CPT

## 2018-12-03 PROCEDURE — 70450 CT HEAD/BRAIN W/O DYE: CPT

## 2018-12-03 PROCEDURE — 99285 EMERGENCY DEPT VISIT HI MDM: CPT | Mod: 25

## 2018-12-03 PROCEDURE — 87086 URINE CULTURE/COLONY COUNT: CPT | Performed by: EMERGENCY MEDICINE

## 2018-12-03 PROCEDURE — 87040 BLOOD CULTURE FOR BACTERIA: CPT | Performed by: EMERGENCY MEDICINE

## 2018-12-03 PROCEDURE — 25000125 ZZHC RX 250

## 2018-12-03 PROCEDURE — 25000128 H RX IP 250 OP 636: Performed by: EMERGENCY MEDICINE

## 2018-12-03 PROCEDURE — 93005 ELECTROCARDIOGRAM TRACING: CPT

## 2018-12-03 PROCEDURE — 85025 COMPLETE CBC W/AUTO DIFF WBC: CPT | Performed by: EMERGENCY MEDICINE

## 2018-12-03 PROCEDURE — 71045 X-RAY EXAM CHEST 1 VIEW: CPT

## 2018-12-03 PROCEDURE — 25000125 ZZHC RX 250: Performed by: EMERGENCY MEDICINE

## 2018-12-03 PROCEDURE — 84145 PROCALCITONIN (PCT): CPT | Performed by: EMERGENCY MEDICINE

## 2018-12-03 PROCEDURE — 81001 URINALYSIS AUTO W/SCOPE: CPT | Performed by: EMERGENCY MEDICINE

## 2018-12-03 PROCEDURE — 87186 SC STD MICRODIL/AGAR DIL: CPT | Performed by: EMERGENCY MEDICINE

## 2018-12-03 RX ADMIN — LIDOCAINE HYDROCHLORIDE 10 ML: 20 JELLY TOPICAL at 21:19

## 2018-12-03 RX ADMIN — SODIUM CHLORIDE 1000 ML: 9 INJECTION, SOLUTION INTRAVENOUS at 21:19

## 2018-12-03 NOTE — IP AVS SNAPSHOT
[facility] [address] [facility]    [address] [place] [address]    Phone:  [phone]                                       After Visit Summary   12/3/2018    Jose Lopez    MRN: [id number]           After Visit Summary Signature Page     I have received my discharge instructions, and my questions have been answered. I have discussed any challenges I see with this plan with the nurse or doctor.    ..........................................................................................................................................  Patient/Patient Representative Signature      ..........................................................................................................................................  Patient Representative Print Name and Relationship to Patient    ..................................................               ................................................  Date                                   Time    ..........................................................................................................................................  Reviewed by Signature/Title    ...................................................              ..............................................  Date                                               Time          22EPIC Rev 08/18
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
## 2018-12-04 ENCOUNTER — APPOINTMENT (OUTPATIENT)
Dept: GENERAL RADIOLOGY | Facility: CLINIC | Age: 58
DRG: 092 | End: 2018-12-04
Attending: INTERNAL MEDICINE
Payer: MEDICARE

## 2018-12-04 LAB — LACTATE BLD-SCNC: 1.2 MMOL/L (ref 0.7–2)

## 2018-12-04 PROCEDURE — A9270 NON-COVERED ITEM OR SERVICE: HCPCS | Mod: GY | Performed by: INTERNAL MEDICINE

## 2018-12-04 PROCEDURE — 25000132 ZZH RX MED GY IP 250 OP 250 PS 637: Mod: GY | Performed by: HOSPITALIST

## 2018-12-04 PROCEDURE — A9270 NON-COVERED ITEM OR SERVICE: HCPCS | Mod: GY | Performed by: HOSPITALIST

## 2018-12-04 PROCEDURE — 99207 ZZC APP CREDIT; MD BILLING SHARED VISIT: CPT | Performed by: INTERNAL MEDICINE

## 2018-12-04 PROCEDURE — 83605 ASSAY OF LACTIC ACID: CPT | Performed by: HOSPITALIST

## 2018-12-04 PROCEDURE — 25000132 ZZH RX MED GY IP 250 OP 250 PS 637: Mod: GY | Performed by: INTERNAL MEDICINE

## 2018-12-04 PROCEDURE — 25000132 ZZH RX MED GY IP 250 OP 250 PS 637: Mod: GY | Performed by: NURSE PRACTITIONER

## 2018-12-04 PROCEDURE — A9270 NON-COVERED ITEM OR SERVICE: HCPCS | Mod: GY | Performed by: NURSE PRACTITIONER

## 2018-12-04 PROCEDURE — 74018 RADEX ABDOMEN 1 VIEW: CPT

## 2018-12-04 PROCEDURE — 99223 1ST HOSP IP/OBS HIGH 75: CPT | Mod: AI | Performed by: HOSPITALIST

## 2018-12-04 PROCEDURE — 36415 COLL VENOUS BLD VENIPUNCTURE: CPT | Performed by: HOSPITALIST

## 2018-12-04 PROCEDURE — 12000000 ZZH R&B MED SURG/OB

## 2018-12-04 PROCEDURE — 99221 1ST HOSP IP/OBS SF/LOW 40: CPT | Performed by: NURSE PRACTITIONER

## 2018-12-04 PROCEDURE — 25000128 H RX IP 250 OP 636: Performed by: HOSPITALIST

## 2018-12-04 RX ORDER — CEFTRIAXONE 1 G/1
1 INJECTION, POWDER, FOR SOLUTION INTRAMUSCULAR; INTRAVENOUS EVERY 24 HOURS
Status: DISCONTINUED | OUTPATIENT
Start: 2018-12-04 | End: 2018-12-05 | Stop reason: HOSPADM

## 2018-12-04 RX ORDER — SODIUM CHLORIDE 9 MG/ML
INJECTION, SOLUTION INTRAVENOUS CONTINUOUS
Status: DISCONTINUED | OUTPATIENT
Start: 2018-12-04 | End: 2018-12-04

## 2018-12-04 RX ORDER — AMOXICILLIN 250 MG
2 CAPSULE ORAL 2 TIMES DAILY
Status: DISCONTINUED | OUTPATIENT
Start: 2018-12-04 | End: 2018-12-05 | Stop reason: HOSPADM

## 2018-12-04 RX ORDER — OXYBUTYNIN CHLORIDE 5 MG/1
5 TABLET ORAL 3 TIMES DAILY
Status: DISCONTINUED | OUTPATIENT
Start: 2018-12-04 | End: 2018-12-05 | Stop reason: HOSPADM

## 2018-12-04 RX ORDER — ACETAMINOPHEN 325 MG/1
650 TABLET ORAL EVERY 4 HOURS PRN
Status: DISCONTINUED | OUTPATIENT
Start: 2018-12-04 | End: 2018-12-05 | Stop reason: HOSPADM

## 2018-12-04 RX ORDER — OXYBUTYNIN CHLORIDE 5 MG/1
5 TABLET ORAL 3 TIMES DAILY
COMMUNITY

## 2018-12-04 RX ORDER — HYDROCORTISONE ACETATE 25 MG/1
25 SUPPOSITORY RECTAL 2 TIMES DAILY PRN
Status: DISCONTINUED | OUTPATIENT
Start: 2018-12-04 | End: 2018-12-05 | Stop reason: HOSPADM

## 2018-12-04 RX ORDER — AMOXICILLIN 250 MG
2 CAPSULE ORAL 2 TIMES DAILY PRN
Status: DISCONTINUED | OUTPATIENT
Start: 2018-12-04 | End: 2018-12-05 | Stop reason: HOSPADM

## 2018-12-04 RX ORDER — PROCHLORPERAZINE 25 MG
25 SUPPOSITORY, RECTAL RECTAL EVERY 12 HOURS PRN
Status: DISCONTINUED | OUTPATIENT
Start: 2018-12-04 | End: 2018-12-05 | Stop reason: HOSPADM

## 2018-12-04 RX ORDER — IBUPROFEN 800 MG/1
800 TABLET, FILM COATED ORAL EVERY 8 HOURS PRN
Status: ON HOLD | COMMUNITY
End: 2023-05-14

## 2018-12-04 RX ORDER — MULTIPLE VITAMINS W/ MINERALS TAB 9MG-400MCG
1 TAB ORAL DAILY
Status: DISCONTINUED | OUTPATIENT
Start: 2018-12-04 | End: 2018-12-05 | Stop reason: HOSPADM

## 2018-12-04 RX ORDER — ONDANSETRON 2 MG/ML
4 INJECTION INTRAMUSCULAR; INTRAVENOUS EVERY 6 HOURS PRN
Status: DISCONTINUED | OUTPATIENT
Start: 2018-12-04 | End: 2018-12-05 | Stop reason: HOSPADM

## 2018-12-04 RX ORDER — ONDANSETRON 4 MG/1
4 TABLET, ORALLY DISINTEGRATING ORAL EVERY 6 HOURS PRN
Status: DISCONTINUED | OUTPATIENT
Start: 2018-12-04 | End: 2018-12-05 | Stop reason: HOSPADM

## 2018-12-04 RX ORDER — AMOXICILLIN 250 MG
1 CAPSULE ORAL 2 TIMES DAILY PRN
Status: DISCONTINUED | OUTPATIENT
Start: 2018-12-04 | End: 2018-12-05 | Stop reason: HOSPADM

## 2018-12-04 RX ORDER — PROCHLORPERAZINE MALEATE 10 MG
10 TABLET ORAL EVERY 6 HOURS PRN
Status: DISCONTINUED | OUTPATIENT
Start: 2018-12-04 | End: 2018-12-05 | Stop reason: HOSPADM

## 2018-12-04 RX ORDER — NALOXONE HYDROCHLORIDE 0.4 MG/ML
.1-.4 INJECTION, SOLUTION INTRAMUSCULAR; INTRAVENOUS; SUBCUTANEOUS
Status: DISCONTINUED | OUTPATIENT
Start: 2018-12-04 | End: 2018-12-05 | Stop reason: HOSPADM

## 2018-12-04 RX ORDER — BISACODYL 10 MG
10 SUPPOSITORY, RECTAL RECTAL DAILY PRN
Status: DISCONTINUED | OUTPATIENT
Start: 2018-12-04 | End: 2018-12-05 | Stop reason: HOSPADM

## 2018-12-04 RX ORDER — POLYETHYLENE GLYCOL 3350 17 G/17G
17 POWDER, FOR SOLUTION ORAL DAILY PRN
Status: DISCONTINUED | OUTPATIENT
Start: 2018-12-04 | End: 2018-12-04 | Stop reason: DRUGHIGH

## 2018-12-04 RX ORDER — AMOXICILLIN 250 MG
1 CAPSULE ORAL 2 TIMES DAILY
Status: DISCONTINUED | OUTPATIENT
Start: 2018-12-04 | End: 2018-12-05 | Stop reason: HOSPADM

## 2018-12-04 RX ORDER — POLYETHYLENE GLYCOL 3350 17 G/17G
1 POWDER, FOR SOLUTION ORAL DAILY PRN
Status: ON HOLD | COMMUNITY
End: 2023-05-14

## 2018-12-04 RX ORDER — POLYETHYLENE GLYCOL 3350 17 G/17G
17 POWDER, FOR SOLUTION ORAL 2 TIMES DAILY PRN
Status: DISCONTINUED | OUTPATIENT
Start: 2018-12-04 | End: 2018-12-05 | Stop reason: HOSPADM

## 2018-12-04 RX ORDER — DOCUSATE SODIUM 100 MG/1
200 CAPSULE, LIQUID FILLED ORAL 2 TIMES DAILY PRN
Status: DISCONTINUED | OUTPATIENT
Start: 2018-12-04 | End: 2018-12-05 | Stop reason: HOSPADM

## 2018-12-04 RX ADMIN — TIZANIDINE 4 MG: 4 TABLET ORAL at 10:16

## 2018-12-04 RX ADMIN — ENOXAPARIN SODIUM 40 MG: 40 INJECTION SUBCUTANEOUS at 02:05

## 2018-12-04 RX ADMIN — TIZANIDINE 4 MG: 4 TABLET ORAL at 21:07

## 2018-12-04 RX ADMIN — TIZANIDINE 4 MG: 4 TABLET ORAL at 16:10

## 2018-12-04 RX ADMIN — TIZANIDINE 4 MG: 4 TABLET ORAL at 05:36

## 2018-12-04 RX ADMIN — BACLOFEN 30 MG: 10 TABLET ORAL at 20:58

## 2018-12-04 RX ADMIN — OXYBUTYNIN CHLORIDE 5 MG: 5 TABLET ORAL at 21:07

## 2018-12-04 RX ADMIN — BACLOFEN 30 MG: 10 TABLET ORAL at 11:59

## 2018-12-04 RX ADMIN — OXYBUTYNIN CHLORIDE 5 MG: 5 TABLET ORAL at 16:10

## 2018-12-04 RX ADMIN — MULTIPLE VITAMINS W/ MINERALS TAB 1 TABLET: TAB at 13:57

## 2018-12-04 RX ADMIN — SODIUM CHLORIDE: 9 INJECTION, SOLUTION INTRAVENOUS at 02:01

## 2018-12-04 RX ADMIN — CEFTRIAXONE SODIUM 1 G: 1 INJECTION, POWDER, FOR SOLUTION INTRAMUSCULAR; INTRAVENOUS at 02:05

## 2018-12-04 RX ADMIN — ACETAMINOPHEN 650 MG: 325 TABLET, FILM COATED ORAL at 21:06

## 2018-12-04 RX ADMIN — SENNOSIDES AND DOCUSATE SODIUM 1 TABLET: 8.6; 5 TABLET ORAL at 08:56

## 2018-12-04 RX ADMIN — SENNOSIDES AND DOCUSATE SODIUM 1 TABLET: 8.6; 5 TABLET ORAL at 20:58

## 2018-12-04 RX ADMIN — BACLOFEN 30 MG: 10 TABLET ORAL at 16:10

## 2018-12-04 RX ADMIN — OMEPRAZOLE 20 MG: 20 CAPSULE, DELAYED RELEASE ORAL at 13:57

## 2018-12-04 ASSESSMENT — ACTIVITIES OF DAILY LIVING (ADL)
ADLS_ACUITY_SCORE: 23
ADLS_ACUITY_SCORE: 16
ADLS_ACUITY_SCORE: 23

## 2018-12-04 ASSESSMENT — PAIN DESCRIPTION - DESCRIPTORS: DESCRIPTORS: RADIATING
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
## 2018-12-04 NOTE — PLAN OF CARE
Problem: Patient Care Overview  Goal: Plan of Care/Patient Progress Review  OT: Orders rec'd and chart reviewed. Pt admitted late last night with Altered mental status likely infectious encephalopathy, likely multifactorial. His urine toxicology was positive for amphetamines and cannabinoids. Will hold OT eval until tomorrow until pt is more medically stable to determine more accurate discharge planning.

## 2018-12-04 NOTE — ED NOTES
Bed: ST01  Expected date:   Expected time:   Means of arrival:   Comments:  Brayan 536  STROKE ALERT  Dr. Cason

## 2018-12-04 NOTE — ED NOTES
"[facility]  ED Nurse Handoff Report    ED Chief complaint: Aphasia (Pt had sudden onset of mumbled speech at 2010. Pt was at home with friends. EMS report smell of marijuana. Pt reports being paralyzed from waist down. )      ED Diagnosis:   Final diagnoses:   Encephalopathy   Paraplegia (H)   Abnormal urinalysis       Code Status: Full Code    Allergies:   Allergies   Allergen Reactions     Sertraline      Other reaction(s): Gastrointestinal       Activity level - Baseline/Home:  Total Care, pt is a paraplegic, pt is able to do his own ADLs    Activity Level - Current:   Total Care     Needed?: No    Isolation: No  Infection: Not Applicable  Bariatric?: No    Vital Signs:   Vitals:    12/03/18 2130 12/03/18 2140 12/03/18 2150 12/03/18 2200   BP: 139/69 143/78 133/80 129/61   Resp:       Temp:       TempSrc:       SpO2: 96% 94% 90% 100%   Weight:       Height:           Cardiac Rhythm: ,        Pain level: 0-10 Pain Scale: 0    Is this patient confused?: Yes, pt oriented to self, person, place.  Does this patient have a guardian?  No         If yes, is there guardianship documents in the Epic \"Code/ACP\" activity?  N/A         Guardian Notified?  N/A  [place] - Suicide Severity Rating Scale Completed?  Yes  If yes, what color did the patient score?  White    Patient Report: Initial Complaint: Jose Lopez is a 58 year old male who presents to the emergency department with a sudden onset of confusion.  Focused Assessment: Cardiac: WDL  Resp: WDL  Neuro: Alert to person, place, situation. Aphasia, confusion, pt repeating his name as answers to questions.  Tests Performed: Labs, Xray, CT  Abnormal Results:   CBC with platelets differential Resulted Abnormal Result -   WBC: 11.3 10e9/L [Ref Range: 4.0 - 11.0]  Absolute Monocytes: 1.4 10e9/L [Ref Range: 0.0 - 1.3]  Collected: 12/3/2018 20:57  Last updated: 12/3/2018 21:09     Comprehensive metabolic panel Resulted Abnormal Result - "   Glucose: 109 mg/dL [Ref Range: 70 - 99]  Collected: 12/3/2018 20:57  Last updated: 12/3/2018 21:27     UA with Microscopic Resulted Abnormal Result -   Ketones Urine: 40 mg/dL [Ref Range: NEG^Negative]  Blood Urine: Trace [Ref Range: NEG^Negative]  Protein Albumin Urine: 10 mg/dL [Ref Range: NEG^Negative]  Leukocyte Esterase Urine: Small [Ref Range: NEG^Negative]  WBC Urine: 8 /HPF [Ref Range: 0 - 5]  Bacteria Urine: Few /HPF [Ref Range: NEG^Negative]  Squamous Epithelial /HPF Urine: 2 /HPF [Ref Range: 0 - 1]  Mucous Urine: Present /LPF [Ref Range: NEG^Negative]  Collected: 12/3/2018 21:32  Last updated: 12/3/2018 21:47         Treatments provided: NS 1000ml bolus, urojet    Family Comments: friend at bedside    OBS brochure/video discussed/provided to patient/family: No              Name of person given brochure if not patient: n/a              Relationship to patient: n/a    ED Medications:   Medications   0.9% sodium chloride BOLUS (0 mLs Intravenous Stopped 12/3/18 2158)   lidocaine 2 % (URO-JET) jelly 10 mL (10 mLs Urethral Not Given 12/3/18 2159)       Drips infusing?:  No    For the majority of the shift this patient was Green.   Interventions performed were n/a.    Severe Sepsis OR Septic Shock Diagnosis Present: No    To be done/followed up on inpatient unit:  continue care    ED NURSE PHONE NUMBER: *[id number]

## 2018-12-04 NOTE — ED NOTES
"Pt going in and out of sleep. When awake. Pt is restless moving UE's irradically. Pt not answering all questions appropriately. Pt responds with his name to questions of \"Where are you?, What day is it?, and Are you having pain?,\" Pt friend reports this is not normal behavior.   "
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
## 2018-12-04 NOTE — PLAN OF CARE
"Problem: Patient Care Overview  Goal: Plan of Care/Patient Progress Review  Outcome: No Change  Pt very anxious and was not oriented to time this am more oriented this afternoon. Pt still complains of abdominal/rectal pain. Pt was rectally checked for stool and no stool present.  Pt only ate fair seems to have concerns for what he eats will \"run right through me\".  VSS Pt agreeable to turn and states his chair is coming in so he can get up in the chair later. Declined to be up in our chair.       "

## 2018-12-04 NOTE — PLAN OF CARE
Problem: Patient Care Overview  Goal: Plan of Care/Patient Progress Review  Outcome: No Change  A&Ox2, disoriented to time and situation. VSS on RA. Denies pain. Pt frequently flailing arms and with incoherent speech. Pt with severe muscle spasms - MD ordered Zanaflex until PTA Baclofen is verified and med rec is done. Paraplegic. Scabs and scratch on L 2nd toe. [place] patent with some bleeding. Plan for neuro consult in the AM.
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
## 2018-12-04 NOTE — PHARMACY-ADMISSION MEDICATION HISTORY
Admission medication history interview status for the 12/3/2018  admission is complete. See EPIC admission navigator for prior to admission medications     Medication history source reliability:Good    Actions taken by pharmacist (provider contacted, etc): called [facility] pharmacy and interviewed patient     Additional medication history information not noted on PTA med list : patient has not decreased his baclofen dose and has not increased his Ditropan dose    Medication reconciliation/reorder completed by provider prior to medication history? No    Time spent in this activity: 30 min    Prior to Admission medications    Medication Sig Last Dose Taking? Auth Provider   baclofen (LIORESAL) 20 MG tablet Take 30 mg by mouth 4 times daily At 0900, 1100, 1500, 2100. unknown Yes Reported, Patient   oxybutynin (DITROPAN) 5 MG tablet Take 5 mg by mouth 3 times daily  unknown Yes Unknown, Entered By History   polyethylene glycol (MIRALAX/GLYCOLAX) packet Take 1 packet by mouth daily as needed  unknown Yes Unknown, Entered By History   VIAGRA 100 MG tablet Take 100 mg by mouth as needed unknown Yes Reported, Patient   docusate sodium (COLACE) 100 MG capsule Take 200 mg by mouth 2 times daily as needed  Unknown at Unknown time  Unknown, Entered By History   hydrocortisone (ANUSOL-HC) 25 MG Suppository Place 25 mg rectally 2 times daily as needed for hemorrhoids Unknown at Unknown time  Unknown, Entered By History   hyoscyamine (LEVSIN/SL) 0.125 MG sublingual tablet Place 0.125 mg under the tongue every 4 hours as needed for cramping Unknown at Unknown time  Unknown, Entered By History   ibuprofen (ADVIL/MOTRIN) 800 MG tablet Take 800 mg by mouth every 8 hours as needed for moderate pain unknonw  Unknown, Entered By History   multivitamin, therapeutic with minerals (THERA-VIT-M) TABS tablet Take 1 tablet by mouth daily Unknown at Unknown time  Unknown, Entered By History   omeprazole (PRILOSEC) 20 MG CR capsule Take 20 mg by mouth  daily  Unknown at Unknown time  Reported, Patient

## 2018-12-05 VITALS
WEIGHT: 174.6 LBS | OXYGEN SATURATION: 99 % | HEIGHT: 68 IN | TEMPERATURE: 98.3 F | BODY MASS INDEX: 26.46 KG/M2 | SYSTOLIC BLOOD PRESSURE: 162 MMHG | DIASTOLIC BLOOD PRESSURE: 89 MMHG | RESPIRATION RATE: 18 BRPM

## 2018-12-05 LAB
ANION GAP SERPL CALCULATED.3IONS-SCNC: 8 MMOL/L (ref 3–14)
BASOPHILS # BLD AUTO: 0.1 10E9/L (ref 0–0.2)
BASOPHILS NFR BLD AUTO: 0.6 %
BUN SERPL-MCNC: 23 MG/DL (ref 7–30)
CALCIUM SERPL-MCNC: 8.8 MG/DL (ref 8.5–10.1)
CHLORIDE SERPL-SCNC: 109 MMOL/L (ref 94–109)
CO2 SERPL-SCNC: 23 MMOL/L (ref 20–32)
CREAT SERPL-MCNC: 0.59 MG/DL (ref 0.66–1.25)
DIFFERENTIAL METHOD BLD: ABNORMAL
EOSINOPHIL # BLD AUTO: 0.1 10E9/L (ref 0–0.7)
EOSINOPHIL NFR BLD AUTO: 1.5 %
ERYTHROCYTE [DISTWIDTH] IN BLOOD BY AUTOMATED COUNT: 13 % (ref 10–15)
GFR SERPL CREATININE-BSD FRML MDRD: >90 ML/MIN/1.7M2
GLUCOSE SERPL-MCNC: 94 MG/DL (ref 70–99)
HCT VFR BLD AUTO: 38.4 % (ref 40–53)
HGB BLD-MCNC: 13 G/DL (ref 13.3–17.7)
IMM GRANULOCYTES # BLD: 0 10E9/L (ref 0–0.4)
IMM GRANULOCYTES NFR BLD: 0.2 %
LYMPHOCYTES # BLD AUTO: 2.1 10E9/L (ref 0.8–5.3)
LYMPHOCYTES NFR BLD AUTO: 24 %
MCH RBC QN AUTO: 28 PG (ref 26.5–33)
MCHC RBC AUTO-ENTMCNC: 33.9 G/DL (ref 31.5–36.5)
MCV RBC AUTO: 83 FL (ref 78–100)
MONOCYTES # BLD AUTO: 0.9 10E9/L (ref 0–1.3)
MONOCYTES NFR BLD AUTO: 9.8 %
NEUTROPHILS # BLD AUTO: 5.5 10E9/L (ref 1.6–8.3)
NEUTROPHILS NFR BLD AUTO: 63.9 %
NRBC # BLD AUTO: 0 10*3/UL
NRBC BLD AUTO-RTO: 0 /100
PLATELET # BLD AUTO: 276 10E9/L (ref 150–450)
POTASSIUM SERPL-SCNC: 3.8 MMOL/L (ref 3.4–5.3)
RBC # BLD AUTO: 4.64 10E12/L (ref 4.4–5.9)
SODIUM SERPL-SCNC: 140 MMOL/L (ref 133–144)
WBC # BLD AUTO: 8.6 10E9/L (ref 4–11)

## 2018-12-05 PROCEDURE — 25000128 H RX IP 250 OP 636: Performed by: HOSPITALIST

## 2018-12-05 PROCEDURE — 25000132 ZZH RX MED GY IP 250 OP 250 PS 637: Mod: GY | Performed by: INTERNAL MEDICINE

## 2018-12-05 PROCEDURE — 99238 HOSP IP/OBS DSCHRG MGMT 30/<: CPT | Performed by: INTERNAL MEDICINE

## 2018-12-05 PROCEDURE — 36415 COLL VENOUS BLD VENIPUNCTURE: CPT | Performed by: HOSPITALIST

## 2018-12-05 PROCEDURE — A9270 NON-COVERED ITEM OR SERVICE: HCPCS | Mod: GY | Performed by: HOSPITALIST

## 2018-12-05 PROCEDURE — A9270 NON-COVERED ITEM OR SERVICE: HCPCS | Mod: GY | Performed by: INTERNAL MEDICINE

## 2018-12-05 PROCEDURE — 85025 COMPLETE CBC W/AUTO DIFF WBC: CPT | Performed by: HOSPITALIST

## 2018-12-05 PROCEDURE — 80048 BASIC METABOLIC PNL TOTAL CA: CPT | Performed by: HOSPITALIST

## 2018-12-05 PROCEDURE — 25000132 ZZH RX MED GY IP 250 OP 250 PS 637: Mod: GY | Performed by: HOSPITALIST

## 2018-12-05 PROCEDURE — 25000132 ZZH RX MED GY IP 250 OP 250 PS 637: Mod: GY | Performed by: NURSE PRACTITIONER

## 2018-12-05 PROCEDURE — A9270 NON-COVERED ITEM OR SERVICE: HCPCS | Mod: GY | Performed by: NURSE PRACTITIONER

## 2018-12-05 RX ORDER — MAGNESIUM CARB/ALUMINUM HYDROX 105-160MG
296 TABLET,CHEWABLE ORAL ONCE
Status: DISCONTINUED | OUTPATIENT
Start: 2018-12-05 | End: 2018-12-05 | Stop reason: HOSPADM

## 2018-12-05 RX ORDER — CALCIUM CARBONATE 500 MG/1
1000 TABLET, CHEWABLE ORAL
Status: DISCONTINUED | OUTPATIENT
Start: 2018-12-05 | End: 2018-12-05 | Stop reason: HOSPADM

## 2018-12-05 RX ADMIN — ENOXAPARIN SODIUM 40 MG: 40 INJECTION SUBCUTANEOUS at 01:40

## 2018-12-05 RX ADMIN — OMEPRAZOLE 20 MG: 20 CAPSULE, DELAYED RELEASE ORAL at 08:21

## 2018-12-05 RX ADMIN — MULTIPLE VITAMINS W/ MINERALS TAB 1 TABLET: TAB at 08:21

## 2018-12-05 RX ADMIN — CEFTRIAXONE SODIUM 1 G: 1 INJECTION, POWDER, FOR SOLUTION INTRAMUSCULAR; INTRAVENOUS at 01:41

## 2018-12-05 RX ADMIN — BACLOFEN 30 MG: 10 TABLET ORAL at 00:12

## 2018-12-05 RX ADMIN — CALCIUM CARBONATE (ANTACID) CHEW TAB 500 MG 1000 MG: 500 CHEW TAB at 06:33

## 2018-12-05 RX ADMIN — BACLOFEN 30 MG: 10 TABLET ORAL at 13:03

## 2018-12-05 RX ADMIN — BACLOFEN 30 MG: 10 TABLET ORAL at 08:21

## 2018-12-05 RX ADMIN — ACETAMINOPHEN 650 MG: 325 TABLET, FILM COATED ORAL at 01:45

## 2018-12-05 RX ADMIN — OXYBUTYNIN CHLORIDE 5 MG: 5 TABLET ORAL at 08:21

## 2018-12-05 RX ADMIN — SENNOSIDES AND DOCUSATE SODIUM 1 TABLET: 8.6; 5 TABLET ORAL at 08:22

## 2018-12-05 RX ADMIN — TIZANIDINE 4 MG: 4 TABLET ORAL at 08:21

## 2018-12-05 ASSESSMENT — ACTIVITIES OF DAILY LIVING (ADL)
ADLS_ACUITY_SCORE: 25
ADLS_ACUITY_SCORE: 22
ADLS_ACUITY_SCORE: 23
ADLS_ACUITY_SCORE: 25

## 2018-12-05 NOTE — PLAN OF CARE
Problem: Patient Care Overview  Goal: Plan of Care/Patient Progress Review  Discharge Planner OT   Patient plan for discharge: home  Current status: Orders received and chart reviewed. Discussed pt with RN, she reports is I and has no needs for therapy at this time.  Barriers to return to prior living situation: none  Recommendations for discharge: Pt is discharging home any minute.  Rationale for recommendations: OT orders completed       Entered by: Luci Marin 12/05/2018 2:49 PM

## 2018-12-05 NOTE — PLAN OF CARE
Problem: Patient Care Overview  Goal: Plan of Care/Patient Progress Review  Outcome: No Change  A/O. AVSS on RA except HTN. Tele NS. C/o pain radiating from buttock to groin area, tylenol given. Self cath, 175ml output. Hemiplegia, up w/lift. Regular diet. Will continue to monitor.

## 2018-12-05 NOTE — PLAN OF CARE
Problem: Patient Care Overview  Goal: Plan of Care/Patient Progress Review  Outcome: Improving  [id number]. Pt A/O#4/improved, forgetful, cooperative/talkative, anxious at times, VSS on RA, pt paraplegic-up w/lift,turned/repo Q2h, declined to get OOB for supper, fair po, no BM, will str cath himself late tonight, IVF to SL, continues on Rocephin, OT/SW following, tele-NSR, discharge pending progress.

## 2018-12-05 NOTE — PLAN OF CARE
Problem: Patient Care Overview  Goal: Plan of Care/Patient Progress Review  Outcome: No Change  Alert and oriented x3, forgetful, anxious.  Up with lift.  Complains of groin discomfort, prn Tylenol as needed.  IV antibiotics per orders.  Tele: NSR.  Lung sounds diminished, 99% room air.  Turn and reposition every two hours.  Patient self caths as needed.  Continue to monitor.

## 2018-12-05 NOTE — PLAN OF CARE
Problem: Patient Care Overview  Goal: Plan of Care/Patient Progress Review  Outcome: Adequate for Discharge Date Met: 12/05/18  A&Ox4, anxious, very talkative, would like to speak with MD about his admit tox screen, denies using street drugs, fixated on this topic. Turn/repos q2hr and PRN as pt requests. VSS. Denies pain. Adeline diet, good appetite. Pt self cath'ing for urine per baseline. Discharge to home.
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
## 2018-12-05 NOTE — PLAN OF CARE
Problem: Patient Care Overview  Goal: Plan of Care/Patient Progress Review  Outcome: Improving  [id number]. Pt A/O#4/improved, forgetful, cooperative/talkative, anxious at times, VSS on RA, pt paraplegic-up w/lift,turned/repo Q2h, declined to get OOB for supper, fair po, no BM, self str cath #1, IVF to SL, continues on Rocephin, OT/SW following, tele-NSR, discharge pending progress.

## 2018-12-05 NOTE — PROGRESS NOTES
Discharge    Patient discharged to home via own w/c with friend.   All belongings are packed up with pt. Pt states he has no valuables in security.    Listed belongings gathered and returned to patient. Yes  Care Plan and Patient education resolved: Yes  Prescriptions if needed, hard copies sent with patient  NA  Home and hospital acquired medications returned to patient: NA  Medication Bin checked and emptied on discharge Yes  Follow up appointment made for patient: Yes

## 2018-12-07 LAB
BACTERIA SPEC CULT: ABNORMAL
Lab: ABNORMAL
SPECIMEN SOURCE: ABNORMAL

## 2018-12-27 ENCOUNTER — HOSPITAL ENCOUNTER (OUTPATIENT)
Dept: GENERAL RADIOLOGY | Facility: CLINIC | Age: 58
Discharge: HOME | End: 2018-12-27
Attending: NURSE PRACTITIONER | Admitting: NURSE PRACTITIONER
Payer: MEDICARE

## 2018-12-27 DIAGNOSIS — K62.89 ANAL OR RECTAL PAIN: ICD-10-CM

## 2018-12-27 DIAGNOSIS — K59.00 CONSTIPATION: ICD-10-CM

## 2018-12-27 PROCEDURE — 74283 THER NMA RDCTJ INTUS/OBSTRCJ: CPT

## 2018-12-27 RX ADMIN — DIATRIZOATE MEGLUMINE AND DIATRIZOATE SODIUM 1 ML: 660; 100 SOLUTION ORAL; RECTAL at 16:11

## 2019-01-02 ENCOUNTER — HOSPITAL ENCOUNTER (OUTPATIENT)
Dept: ULTRASOUND IMAGING | Facility: CLINIC | Age: 59
Discharge: HOME | End: 2019-01-02
Attending: NURSE PRACTITIONER | Admitting: NURSE PRACTITIONER
Payer: COMMERCIAL

## 2019-01-02 DIAGNOSIS — K62.89 RECTAL PAIN: ICD-10-CM

## 2019-01-02 DIAGNOSIS — R10.30 INGUINAL PAIN, UNSPECIFIED LATERALITY: ICD-10-CM

## 2019-01-02 PROCEDURE — 76857 US EXAM PELVIC LIMITED: CPT

## 2019-03-21 ENCOUNTER — OFFICE VISIT (OUTPATIENT)
Dept: PHYSICAL MEDICINE AND REHAB | Facility: CLINIC | Age: 59
End: 2019-03-21

## 2019-03-21 VITALS
OXYGEN SATURATION: 96 % | WEIGHT: 180.1 LBS | HEART RATE: 78 BPM | DIASTOLIC BLOOD PRESSURE: 74 MMHG | BODY MASS INDEX: 27.38 KG/M2 | SYSTOLIC BLOOD PRESSURE: 142 MMHG | RESPIRATION RATE: 16 BRPM | TEMPERATURE: 98.2 F

## 2019-03-21 DIAGNOSIS — G82.20 PARAPLEGIA (H): Primary | ICD-10-CM

## 2019-03-21 DIAGNOSIS — N31.9 NEUROGENIC BLADDER: ICD-10-CM

## 2019-03-21 DIAGNOSIS — M62.838 MUSCLE SPASTICITY: ICD-10-CM

## 2019-03-21 DIAGNOSIS — F19.90 SUBSTANCE USE DISORDER: ICD-10-CM

## 2019-03-21 DIAGNOSIS — K59.2 NEUROGENIC BOWEL: ICD-10-CM

## 2019-03-21 RX ORDER — GABAPENTIN 300 MG/1
CAPSULE ORAL
Refills: 1 | Status: ON HOLD | COMMUNITY
Start: 2019-02-05 | End: 2023-05-14

## 2019-03-21 ASSESSMENT — PAIN SCALES - GENERAL: PAINLEVEL: NO PAIN (0)

## 2019-03-21 NOTE — LETTER
3/21/2019      RE: Jose Lopez  [address]  [place] [address]       Physical medicine rehabilitation clinic note:    Mr. Lopez is a 58-year-old with paraplegia spasticity neurogenic bowel and bladder.  Returns to rehab clinic having last seen me a year ago. He cancelled / no-showed in November. Also sees Dr. Fountain at .  Has had several visits still with bowel impaction and has seen gastroenterology.  Had hospitalization with encephalopathy attributed to chemical use including benzodiazapine, opioids and cannabinoids.     Reports taking  Gabapentin 300 mg twice a day, on that for few months.  Oxybutynin 15 mg ER daily  Baclofen 20 mg QID.  Now off tizanidine.     Reports several concerns about pain, drop in quality of life for several years, getting let go from a job recently though his chief concern today is still around bowels. He has had several ER visits and admissions with impaction but also with identified anal fissure and hemorrhoids. He continues to swing in consistency but most often on the constipated side though some looser stools at times and incontinence. He does however report some episodes of relative stability even near a month utilizing duclolax by mouth BID and dig stim in AM with enema. 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  Pressed for different contributing factors or other changes at first nothing comes up though pressed further there may be significant caffeine intake that fluctuates especially when he needs to drive. He also acknowledges using marijuana frequently as well as illicit acquired opioids (percocet and oxycodone) as well as benzodiazapines - all of these were positive in recent hospitalization tox screen.  Reports being desperate with the pains he was / is having.    Bladder function continues with intermittent catheterization but has had some bladder spasms and leakage. Reports concerning episode of full  incontinence in bed but not isolated to this.  His oxybutynin has been changed from IR 5 TID to ER 15 mg daily.  He reports some variable blurry vision but not able to clearly link to oxybutynin dose / formulation change (though possibly). Has been on oxybutynin for a long time without blurry vision.  Was recently started on gabapentin and wonders if some of symptoms are side effects from that.    On exam:  /74 (BP Location: Right arm, Patient Position: Chair, Cuff Size: Adult Large)   Pulse 78   Temp 98.2  F (36.8  C) (Oral)   Resp 16   Wt 81.7 kg (180 lb 1.6 oz)   SpO2 96%   BMI 27.38 kg/m     Alert, conversant with fluent speech / language  Frequent pressured speech but not seeming manic, directable. Intensity to discussion he's eventually able to settle and track dialogue well.  No diaphoresis  Pupils equal, typical diameter for light  Arrives in well used manual wheelchair but fitting generally well.  No involuntary spasms seen.  Deferred MMT or neuro exam.    Assessment and recommendations:  1. Longer conversation again today exploring broader situation but some focus on impact on bowels. Pain, spasticity, mood, bowel and bladder, anal fissures, hemorrhoids, along with substance use, medication side effects all may have complex interplay. I actually diagrammed with [place] being his symptoms with various contributing arrows and interplay with them.  2. We numbered some items recommending he address to simplify the situations and maximize his ability to manage and have more regular routine.  3. First and foremost I encouraged abstainence from opioids, benzodiazepines and even marijuana. While some people can have effective benefits from marijuana including with spasticity and pain, I suspect in his case this impacts mood and bowel function in an unpredictable way. He's in agreement  4. Based on presence / persistence of some of his symptoms once off chemicals, recommend systematic trial off  medications. Subsequent steps likewise depends on results of prior steps.  5. He feels some symptoms with fatigue and blurry vision may be gabapentin being more recently started medication. Reasonable to trial off as seems only on lower 300 mg dose, wouldn't necessarily need to taper.  6. Oxybutynin in particular for his blurry vision may be linked. Trial down on dose (says he takes a 10 + 5 mg ER capsules), can go down to just 10 then 5 mg and see.  7. Neurogenic bowel. Above may impact bowel regularity and while he has had cycles of considerable impaction, I'm speculating other influences including opioid use. He may go with above routine that had worked with PO Ducolax oral, dig stim and enema.  8. Encouraged follow up with colorectal surgeon / gastroenterology for management of anal fissure, apparently using hydrocortisone suppositories. Managing this pain will help.  9. Discussed impact of caffeine possibly on bowel function in general being pro-cathartic. He should regulate amount, avoid the high caffeine drinks / energy boost was taking to stay awake during driving. Some caffeine however can be used as part of morning bowel routine for some people. Another key is avoiding swings in amount of intake.  10. Mood ties into all of his symptomatology and I encouraged support through counseling and can talk with primary about any medication though in general already has some polypharmacy.   11. Do believe having consistent system and providers is important for Jose. Already established with Veronica Fountain and I encouraged his ongoing following with her. Subsequent feedback and optimization of his medication trials etc will defer back to the providers who prescribed most items in past.  12. I'll not schedule f/u with him at this time but he has our contact information   70 minutes spent with Mr. Lopez today including extended conversations in various factors as above. Of this > 50% in education and  counseling.    Jose Belle MD    CC Veronica Fountain, Mpho Telma

## 2019-03-21 NOTE — PROGRESS NOTES
Physical medicine rehabilitation clinic note:    Mr. Lopez is a 58-year-old with paraplegia spasticity neurogenic bowel and bladder.  Returns to rehab clinic having last seen me a year ago. He cancelled / no-showed in November. Also sees Dr. Fountain at .  Has had several visits still with bowel impaction and has seen gastroenterology.  Had hospitalization with encephalopathy attributed to chemical use including benzodiazapine, opioids and cannabinoids.     Reports taking  Gabapentin 300 mg twice a day, on that for few months.  Oxybutynin 15 mg ER daily  Baclofen 20 mg QID.  Now off tizanidine.     Reports several concerns about pain, drop in quality of life for several years, getting let go from a job recently though his chief concern today is still around bowels. He has had several ER visits and admissions with impaction but also with identified anal fissure and hemorrhoids. He continues to swing in consistency but most often on the constipated side though some looser stools at times and incontinence. He does however report some episodes of relative stability even near a month utilizing duclolax by mouth BID and dig stim in AM with enema. 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  Pressed for different contributing factors or other changes at first nothing comes up though pressed further there may be significant caffeine intake that fluctuates especially when he needs to drive. He also acknowledges using marijuana frequently as well as illicit acquired opioids (percocet and oxycodone) as well as benzodiazapines - all of these were positive in recent hospitalization tox screen.  Reports being desperate with the pains he was / is having.    Bladder function continues with intermittent catheterization but has had some bladder spasms and leakage. Reports concerning episode of full incontinence in bed but not isolated to this.  His oxybutynin has been changed from  IR 5 TID to ER 15 mg daily.  He reports some variable blurry vision but not able to clearly link to oxybutynin dose / formulation change (though possibly). Has been on oxybutynin for a long time without blurry vision.  Was recently started on gabapentin and wonders if some of symptoms are side effects from that.    On exam:  /74 (BP Location: Right arm, Patient Position: Chair, Cuff Size: Adult Large)   Pulse 78   Temp 98.2  F (36.8  C) (Oral)   Resp 16   Wt 81.7 kg (180 lb 1.6 oz)   SpO2 96%   BMI 27.38 kg/m    Alert, conversant with fluent speech / language  Frequent pressured speech but not seeming manic, directable. Intensity to discussion he's eventually able to settle and track dialogue well.  No diaphoresis  Pupils equal, typical diameter for light  Arrives in well used manual wheelchair but fitting generally well.  No involuntary spasms seen.  Deferred MMT or neuro exam.    Assessment and recommendations:  1. Longer conversation again today exploring broader situation but some focus on impact on bowels. Pain, spasticity, mood, bowel and bladder, anal fissures, hemorrhoids, along with substance use, medication side effects all may have complex interplay. I actually diagrammed with [place] being his symptoms with various contributing arrows and interplay with them.  2. We numbered some items recommending he address to simplify the situations and maximize his ability to manage and have more regular routine.  3. First and foremost I encouraged abstainence from opioids, benzodiazepines and even marijuana. While some people can have effective benefits from marijuana including with spasticity and pain, I suspect in his case this impacts mood and bowel function in an unpredictable way. He's in agreement  4. Based on presence / persistence of some of his symptoms once off chemicals, recommend systematic trial off medications. Subsequent steps likewise depends on results of prior steps.  5. He feels  some symptoms with fatigue and blurry vision may be gabapentin being more recently started medication. Reasonable to trial off as seems only on lower 300 mg dose, wouldn't necessarily need to taper.  6. Oxybutynin in particular for his blurry vision may be linked. Trial down on dose (says he takes a 10 + 5 mg ER capsules), can go down to just 10 then 5 mg and see.  7. Neurogenic bowel. Above may impact bowel regularity and while he has had cycles of considerable impaction, I'm speculating other influences including opioid use. He may go with above routine that had worked with PO Ducolax oral, dig stim and enema.  8. Encouraged follow up with colorectal surgeon / gastroenterology for management of anal fissure, apparently using hydrocortisone suppositories. Managing this pain will help.  9. Discussed impact of caffeine possibly on bowel function in general being pro-cathartic. He should regulate amount, avoid the high caffeine drinks / energy boost was taking to stay awake during driving. Some caffeine however can be used as part of morning bowel routine for some people. Another key is avoiding swings in amount of intake.  10. Mood ties into all of his symptomatology and I encouraged support through counseling and can talk with primary about any medication though in general already has some polypharmacy.   11. Do believe having consistent system and providers is important for Jose. Already established with Veronica Fountain and I encouraged his ongoing following with her. Subsequent feedback and optimization of his medication trials etc will defer back to the providers who prescribed most items in past.  12. I'll not schedule f/u with him at this time but he has our contact information   70 minutes spent with Mr. Lopez today including extended conversations in various factors as above. Of this > 50% in education and counseling.    CC Veronica Fountain, Mpho Telma

## 2019-04-29 ENCOUNTER — TRANSFERRED RECORDS (OUTPATIENT)
Dept: HEALTH INFORMATION MANAGEMENT | Facility: CLINIC | Age: 59
End: 2019-04-29

## 2019-04-29 LAB
CREAT SERPL-MCNC: 0.76 MG/DL (ref 0.73–1.18)
GFR SERPL CREATININE-BSD FRML MDRD: >60 ML/MIN/1.73M2
POTASSIUM SERPL-SCNC: 4.2 MMOL/L (ref 3.5–5.1)

## 2019-04-30 ENCOUNTER — ANESTHESIA EVENT (OUTPATIENT)
Dept: SURGERY | Facility: CLINIC | Age: 59
End: 2019-04-30
Payer: COMMERCIAL

## 2019-05-01 ENCOUNTER — ANESTHESIA (OUTPATIENT)
Dept: SURGERY | Facility: CLINIC | Age: 59
End: 2019-05-01
Payer: COMMERCIAL

## 2019-05-01 ENCOUNTER — HOSPITAL ENCOUNTER (OUTPATIENT)
Facility: CLINIC | Age: 59
Discharge: HOME | End: 2019-05-01
Attending: COLON & RECTAL SURGERY | Admitting: COLON & RECTAL SURGERY
Payer: COMMERCIAL

## 2019-05-01 VITALS
SYSTOLIC BLOOD PRESSURE: 136 MMHG | DIASTOLIC BLOOD PRESSURE: 81 MMHG | RESPIRATION RATE: 16 BRPM | TEMPERATURE: 96.3 F | WEIGHT: 168 LBS | HEART RATE: 75 BPM | BODY MASS INDEX: 24.05 KG/M2 | OXYGEN SATURATION: 99 % | HEIGHT: 70 IN

## 2019-05-01 DIAGNOSIS — G82.20 PARAPLEGIA (H): Primary | ICD-10-CM

## 2019-05-01 PROCEDURE — 27210794 ZZH OR GENERAL SUPPLY STERILE: Performed by: COLON & RECTAL SURGERY

## 2019-05-01 PROCEDURE — 25000566 ZZH SEVOFLURANE, EA 15 MIN: Performed by: COLON & RECTAL SURGERY

## 2019-05-01 PROCEDURE — 25000128 H RX IP 250 OP 636: Performed by: NURSE ANESTHETIST, CERTIFIED REGISTERED

## 2019-05-01 PROCEDURE — 71000027 ZZH RECOVERY PHASE 2 EACH 15 MINS: Performed by: COLON & RECTAL SURGERY

## 2019-05-01 PROCEDURE — 71000013 ZZH RECOVERY PHASE 1 LEVEL 1 EA ADDTL HR: Performed by: COLON & RECTAL SURGERY

## 2019-05-01 PROCEDURE — 37000008 ZZH ANESTHESIA TECHNICAL FEE, 1ST 30 MIN: Performed by: COLON & RECTAL SURGERY

## 2019-05-01 PROCEDURE — 88304 TISSUE EXAM BY PATHOLOGIST: CPT | Mod: 26 | Performed by: COLON & RECTAL SURGERY

## 2019-05-01 PROCEDURE — 36000052 ZZH SURGERY LEVEL 2 EA 15 ADDTL MIN: Performed by: COLON & RECTAL SURGERY

## 2019-05-01 PROCEDURE — 25000125 ZZHC RX 250: Performed by: COLON & RECTAL SURGERY

## 2019-05-01 PROCEDURE — 71000012 ZZH RECOVERY PHASE 1 LEVEL 1 FIRST HR: Performed by: COLON & RECTAL SURGERY

## 2019-05-01 PROCEDURE — 25000128 H RX IP 250 OP 636: Performed by: COLON & RECTAL SURGERY

## 2019-05-01 PROCEDURE — 36000050 ZZH SURGERY LEVEL 2 1ST 30 MIN: Performed by: COLON & RECTAL SURGERY

## 2019-05-01 PROCEDURE — 25000132 ZZH RX MED GY IP 250 OP 250 PS 637: Performed by: ANESTHESIOLOGY

## 2019-05-01 PROCEDURE — 37000009 ZZH ANESTHESIA TECHNICAL FEE, EACH ADDTL 15 MIN: Performed by: COLON & RECTAL SURGERY

## 2019-05-01 PROCEDURE — 40000169 ZZH STATISTIC PRE-PROCEDURE ASSESSMENT I: Performed by: COLON & RECTAL SURGERY

## 2019-05-01 PROCEDURE — 25800030 ZZH RX IP 258 OP 636: Performed by: NURSE ANESTHETIST, CERTIFIED REGISTERED

## 2019-05-01 PROCEDURE — 88304 TISSUE EXAM BY PATHOLOGIST: CPT | Performed by: COLON & RECTAL SURGERY

## 2019-05-01 PROCEDURE — 25000132 ZZH RX MED GY IP 250 OP 250 PS 637: Performed by: COLON & RECTAL SURGERY

## 2019-05-01 PROCEDURE — 25000125 ZZHC RX 250: Performed by: NURSE ANESTHETIST, CERTIFIED REGISTERED

## 2019-05-01 RX ORDER — FENTANYL CITRATE 50 UG/ML
25-50 INJECTION, SOLUTION INTRAMUSCULAR; INTRAVENOUS EVERY 5 MIN PRN
Status: DISCONTINUED | OUTPATIENT
Start: 2019-05-01 | End: 2019-05-01 | Stop reason: HOSPADM

## 2019-05-01 RX ORDER — ONDANSETRON 2 MG/ML
INJECTION INTRAMUSCULAR; INTRAVENOUS PRN
Status: DISCONTINUED | OUTPATIENT
Start: 2019-05-01 | End: 2019-05-01

## 2019-05-01 RX ORDER — PROPOFOL 10 MG/ML
INJECTION, EMULSION INTRAVENOUS PRN
Status: DISCONTINUED | OUTPATIENT
Start: 2019-05-01 | End: 2019-05-01

## 2019-05-01 RX ORDER — NALOXONE HYDROCHLORIDE 0.4 MG/ML
.1-.4 INJECTION, SOLUTION INTRAMUSCULAR; INTRAVENOUS; SUBCUTANEOUS
Status: DISCONTINUED | OUTPATIENT
Start: 2019-05-01 | End: 2019-05-01 | Stop reason: HOSPADM

## 2019-05-01 RX ORDER — HYDROMORPHONE HYDROCHLORIDE 1 MG/ML
.3-.5 INJECTION, SOLUTION INTRAMUSCULAR; INTRAVENOUS; SUBCUTANEOUS EVERY 10 MIN PRN
Status: DISCONTINUED | OUTPATIENT
Start: 2019-05-01 | End: 2019-05-01 | Stop reason: HOSPADM

## 2019-05-01 RX ORDER — SODIUM CHLORIDE, SODIUM LACTATE, POTASSIUM CHLORIDE, CALCIUM CHLORIDE 600; 310; 30; 20 MG/100ML; MG/100ML; MG/100ML; MG/100ML
INJECTION, SOLUTION INTRAVENOUS CONTINUOUS PRN
Status: DISCONTINUED | OUTPATIENT
Start: 2019-05-01 | End: 2019-05-01

## 2019-05-01 RX ORDER — IBUPROFEN 600 MG/1
600 TABLET, FILM COATED ORAL
Status: DISCONTINUED | OUTPATIENT
Start: 2019-05-01 | End: 2019-05-01 | Stop reason: HOSPADM

## 2019-05-01 RX ORDER — ACETAMINOPHEN 325 MG/1
975 TABLET ORAL ONCE
Status: COMPLETED | OUTPATIENT
Start: 2019-05-01 | End: 2019-05-01

## 2019-05-01 RX ORDER — DEXAMETHASONE SODIUM PHOSPHATE 4 MG/ML
INJECTION, SOLUTION INTRA-ARTICULAR; INTRALESIONAL; INTRAMUSCULAR; INTRAVENOUS; SOFT TISSUE PRN
Status: DISCONTINUED | OUTPATIENT
Start: 2019-05-01 | End: 2019-05-01

## 2019-05-01 RX ORDER — OXYCODONE HYDROCHLORIDE 5 MG/1
5 TABLET ORAL
Status: COMPLETED | OUTPATIENT
Start: 2019-05-01 | End: 2019-05-01

## 2019-05-01 RX ORDER — BUPIVACAINE HYDROCHLORIDE 5 MG/ML
INJECTION, SOLUTION PERINEURAL PRN
Status: DISCONTINUED | OUTPATIENT
Start: 2019-05-01 | End: 2019-05-01 | Stop reason: HOSPADM

## 2019-05-01 RX ORDER — SODIUM CHLORIDE, SODIUM LACTATE, POTASSIUM CHLORIDE, CALCIUM CHLORIDE 600; 310; 30; 20 MG/100ML; MG/100ML; MG/100ML; MG/100ML
INJECTION, SOLUTION INTRAVENOUS CONTINUOUS
Status: DISCONTINUED | OUTPATIENT
Start: 2019-05-01 | End: 2019-05-01 | Stop reason: HOSPADM

## 2019-05-01 RX ORDER — ONDANSETRON 4 MG/1
4 TABLET, ORALLY DISINTEGRATING ORAL EVERY 30 MIN PRN
Status: DISCONTINUED | OUTPATIENT
Start: 2019-05-01 | End: 2019-05-01 | Stop reason: HOSPADM

## 2019-05-01 RX ORDER — ONDANSETRON 2 MG/ML
4 INJECTION INTRAMUSCULAR; INTRAVENOUS EVERY 30 MIN PRN
Status: DISCONTINUED | OUTPATIENT
Start: 2019-05-01 | End: 2019-05-01 | Stop reason: HOSPADM

## 2019-05-01 RX ORDER — ONDANSETRON 4 MG/1
4 TABLET, ORALLY DISINTEGRATING ORAL
Status: DISCONTINUED | OUTPATIENT
Start: 2019-05-01 | End: 2019-05-01 | Stop reason: HOSPADM

## 2019-05-01 RX ORDER — MAGNESIUM HYDROXIDE 1200 MG/15ML
LIQUID ORAL PRN
Status: DISCONTINUED | OUTPATIENT
Start: 2019-05-01 | End: 2019-05-01 | Stop reason: HOSPADM

## 2019-05-01 RX ORDER — CELECOXIB 200 MG/1
200 CAPSULE ORAL ONCE
Status: COMPLETED | OUTPATIENT
Start: 2019-05-01 | End: 2019-05-01

## 2019-05-01 RX ORDER — FENTANYL CITRATE 50 UG/ML
INJECTION, SOLUTION INTRAMUSCULAR; INTRAVENOUS PRN
Status: DISCONTINUED | OUTPATIENT
Start: 2019-05-01 | End: 2019-05-01

## 2019-05-01 RX ORDER — LIDOCAINE 50 MG/G
OINTMENT TOPICAL PRN
Qty: 60 G | Refills: 1 | Status: ON HOLD | OUTPATIENT
Start: 2019-05-01 | End: 2023-05-14

## 2019-05-01 RX ADMIN — ACETAMINOPHEN 975 MG: 325 TABLET, FILM COATED ORAL at 10:14

## 2019-05-01 RX ADMIN — PHENYLEPHRINE HYDROCHLORIDE 200 MCG: 10 INJECTION, SOLUTION INTRAMUSCULAR; INTRAVENOUS; SUBCUTANEOUS at 11:11

## 2019-05-01 RX ADMIN — DEXAMETHASONE SODIUM PHOSPHATE 4 MG: 4 INJECTION, SOLUTION INTRA-ARTICULAR; INTRALESIONAL; INTRAMUSCULAR; INTRAVENOUS; SOFT TISSUE at 11:09

## 2019-05-01 RX ADMIN — ROCURONIUM BROMIDE 40 MG: 10 INJECTION INTRAVENOUS at 11:00

## 2019-05-01 RX ADMIN — FENTANYL CITRATE 25 MCG: 50 INJECTION, SOLUTION INTRAMUSCULAR; INTRAVENOUS at 10:57

## 2019-05-01 RX ADMIN — ONDANSETRON 4 MG: 2 INJECTION INTRAMUSCULAR; INTRAVENOUS at 11:09

## 2019-05-01 RX ADMIN — OXYCODONE HYDROCHLORIDE 5 MG: 5 TABLET ORAL at 12:19

## 2019-05-01 RX ADMIN — DEXMEDETOMIDINE HYDROCHLORIDE 12 MCG: 100 INJECTION, SOLUTION INTRAVENOUS at 10:57

## 2019-05-01 RX ADMIN — SUGAMMADEX 400 MG: 100 INJECTION, SOLUTION INTRAVENOUS at 11:21

## 2019-05-01 RX ADMIN — SODIUM CHLORIDE, POTASSIUM CHLORIDE, SODIUM LACTATE AND CALCIUM CHLORIDE: 600; 310; 30; 20 INJECTION, SOLUTION INTRAVENOUS at 10:58

## 2019-05-01 RX ADMIN — MIDAZOLAM 1 MG: 1 INJECTION INTRAMUSCULAR; INTRAVENOUS at 10:57

## 2019-05-01 RX ADMIN — CELECOXIB 200 MG: 200 CAPSULE ORAL at 10:15

## 2019-05-01 RX ADMIN — PROPOFOL 200 MG: 10 INJECTION, EMULSION INTRAVENOUS at 11:00

## 2019-05-01 RX ADMIN — FENTANYL CITRATE 25 MCG: 50 INJECTION, SOLUTION INTRAMUSCULAR; INTRAVENOUS at 11:00

## 2019-05-01 ASSESSMENT — COPD QUESTIONNAIRES: COPD: 0

## 2019-05-01 ASSESSMENT — MIFFLIN-ST. JEOR: SCORE: 1598.29

## 2019-05-01 ASSESSMENT — LIFESTYLE VARIABLES: TOBACCO_USE: 0

## 2019-05-01 NOTE — BRIEF OP NOTE
[facility]    Brief Operative Note    Pre-operative diagnosis: RECTAL PAIN  Post-operative diagnosis Fissure fistula  Procedure: Procedure(s):  EXAM UNDER ANESTHESIA, RECTUM  FISTULOTOMY AND REMOVAL OF ANAL SKIN TAGS  Surgeon: Surgeon(s) and Role:     * Tiburcio Barros MD - Primary     * Brittany Begum MD - Fellow - Assisting  Anesthesia: General   Estimated blood loss: None  Drains: None  Specimens:   ID Type Source Tests Collected by Time Destination   A : RIGHT POSTERIOR SKIN TAG Tissue Buttock SURGICAL PATHOLOGY EXAM Tiburcio Barros MD 5/1/2019 11:15 AM      Findings:   None.  Complications: None.  Implants:  * No implants in log *

## 2019-05-01 NOTE — ANESTHESIA PREPROCEDURE EVALUATION
Anesthesia Pre-Procedure Evaluation    Patient: Jose Lopez   MRN: [id number] : 1960          Preoperative Diagnosis: RECTAL PAIN    Procedure(s):  EXAM UNDER ANESTHESIA, RECTUM  POSSIBLE HEMORRHOIDECTOMY INTERNAL  FISTULECTOMY, RECTUM    Past Medical History:   Diagnosis Date     Benzodiazepine dependence (H)      Muscle spasticity      Neurogenic bladder      Neurogenic bowel      Paraplegia (H)      Sepsis (H)     from UTI     Spinal cord injury at T9 level (H)     per chart review     Substance abuse (H)      UTI (urinary tract infection)      Past Surgical History:   Procedure Laterality Date     BACK SURGERY       CHOLECYSTECTOMY       ORTHOPEDIC SURGERY         Anesthesia Evaluation     . Pt has had prior anesthetic. Type: General    No history of anesthetic complications          ROS/MED HX    ENT/Pulmonary:      (-) tobacco use, asthma and COPD   Neurologic:     (+)Spinal cord injury (paraplegia, spacticity) level of injury: T9    (-) CVA, TIA and Neuropathy   Cardiovascular:        (-) hypertension, CAD, irregular heartbeat/palpitations and stent   METS/Exercise Tolerance:     Hematologic:        (-) anemia   Musculoskeletal:         GI/Hepatic:        (-) GERD and liver disease   Renal/Genitourinary:      (-) renal disease   Endo:      (-) Type I DM, Type II DM and thyroid disease   Psychiatric:         Infectious Disease:  - neg infectious disease ROS       Malignancy:         Other:    (+) other significant disability Wheelchair bound                        Physical Exam  Normal systems: cardiovascular, pulmonary and dental    Airway   Mallampati: II  TM distance: >3 FB  Neck ROM: full    Dental     Cardiovascular   Rhythm and rate: regular and normal      Pulmonary    breath sounds clear to auscultation            Lab Results   Component Value Date    WBC 8.6 2018    HGB 13.0 (L) 2018    HCT 38.4 (L) 2018     2018     2018    POTASSIUM 3.8  "12/05/2018    CHLORIDE 109 12/05/2018    CO2 23 12/05/2018    BUN 23 12/05/2018    CR 0.59 (L) 12/05/2018    GLC 94 12/05/2018    MAK 8.8 12/05/2018    MAG 2.2 08/16/2018    ALBUMIN 3.8 12/03/2018    PROTTOTAL 7.6 12/03/2018    ALT 35 12/03/2018    AST 24 12/03/2018    ALKPHOS 84 12/03/2018    BILITOTAL 0.3 12/03/2018    LIPASE 127 04/19/2018    INR 1.02 08/16/2018       Preop Vitals  BP Readings from Last 3 Encounters:   05/01/19 127/74   03/21/19 142/74   12/05/18 162/89    Pulse Readings from Last 3 Encounters:   03/21/19 78   08/18/18 93   04/22/18 65      Resp Readings from Last 3 Encounters:   03/21/19 16   12/05/18 18   08/19/18 18    SpO2 Readings from Last 3 Encounters:   05/01/19 98%   03/21/19 96%   12/05/18 99%      Temp Readings from Last 1 Encounters:   05/01/19 36.5  C (97.7  F)    Ht Readings from Last 1 Encounters:   05/01/19 1.778 m (5' 10\")      Wt Readings from Last 1 Encounters:   05/01/19 76.2 kg (168 lb)    Estimated body mass index is 24.11 kg/m  as calculated from the following:    Height as of this encounter: 1.778 m (5' 10\").    Weight as of this encounter: 76.2 kg (168 lb).       Anesthesia Plan      History & Physical Review  History and physical reviewed and following examination; no interval change.    ASA Status:  2 .    NPO Status:  > 6 hours    Plan for General and ETT with Intravenous induction. Maintenance will be Balanced.    PONV prophylaxis:  Ondansetron (or other 5HT-3)       Postoperative Care  Postoperative pain management:  Oral pain medications and Multi-modal analgesia.      Consents  Anesthetic plan, risks, benefits and alternatives discussed with:  Patient..                 Matthew Retana MD  "

## 2019-05-01 NOTE — ANESTHESIA CARE TRANSFER NOTE
Patient: Jose Lopez    Procedure(s):  EXAM UNDER ANESTHESIA, RECTUM  FISTULOTOMY AND REMOVAL OF ANAL SKIN TAGS    Diagnosis: RECTAL PAIN  Diagnosis Additional Information: No value filed.    Anesthesia Type:   General, ETT     Note:  Airway :Face Mask  Patient transferred to:PACU  Comments: Spontaneously breathing with adequate vT, sats 98 - 100%, TOF 4/4 with sustained tetany. Purposeful movement, following commands with 100% O2 at 15 L/min, suctioned x2, Anesthesiologist notified.  Extubated.  To PACU with O2 via SFM at 10 L/minute, VSS. Monitors on, report to RN.Handoff Report: Identifed the Patient, Identified the Reponsible Provider, Reviewed the pertinent medical history, Discussed the surgical course, Reviewed Intra-OP anesthesia mangement and issues during anesthesia, Set expectations for post-procedure period and Allowed opportunity for questions and acknowledgement of understanding      Vitals: (Last set prior to Anesthesia Care Transfer)    CRNA VITALS  5/1/2019 1059 - 5/1/2019 1136      5/1/2019             Pulse:  73    SpO2:  97 %    Resp Rate (set):  10                Electronically Signed By: ELLEN Rich CRNA  May 1, 2019  11:36 AM

## 2019-05-01 NOTE — DISCHARGE INSTRUCTIONS
Today you were given 975 mg of Tylenol at 10:15 AM. The recommended daily maximum dose is 4000 mg.     You were given 1 oxycotin at 12:15PM    While you were at the hospital today you received Celebrex at 10:15 AM, an antiinflammatory medication similar to Ibuprofen.  You should not take other antiinflammatory medication, such as Ibuprofen, Motrin, Advil, Aleve, Naprosyn, etc, for at least 8 hours.     Same Day Surgery Discharge Instructions for  Sedation and General Anesthesia       It's not unusual to feel dizzy, light-headed or faint for up to 24 hours after surgery or while taking pain medication.  If you have these symptoms: sit for a few minutes before standing and have someone assist you when you get up to walk or use the bathroom.      You should rest and relax for the next 24 hours. We recommend you make arrangements to have an adult stay with you for at least 24 hours after your discharge.  Avoid hazardous and strenuous activity.      DO NOT DRIVE any vehicle or operate mechanical equipment for 24 hours following the end of your surgery.  Even though you may feel normal, your reactions may be affected by the medication you have received.      Do not drink alcoholic beverages for 24 hours following surgery.       Slowly progress to your regular diet as you feel able. It's not unusual to feel nauseated and/or vomit after receiving anesthesia.  If you develop these symptoms, drink clear liquids (apple juice, ginger ale, broth, 7-up, etc. ) until you feel better.  If your nausea and vomiting persists for 24 hours, please notify your surgeon.        All narcotic pain medications, along with inactivity and anesthesia, can cause constipation. Drinking plenty of liquids and increasing fiber intake will help.      For any questions of a medical nature, call your surgeon.      Do not make important decisions for 24 hours.      If you had general anesthesia, you may have a sore throat for a couple of days related to  the breathing tube used during surgery.  You may use Cepacol lozenges to help with this discomfort.  If it worsens or if you develop a fever, contact your surgeon.       If you feel your pain is not well managed with the pain medications prescribed by your surgeon, please contact your surgeon's office to let them know so they can address your concerns.         Discharge Instructions Following Anorectal Surgery  [facility]  [phone]  Medication:     You may be prescribed a narcotic pain relievers such as: Vicodin, Percocet, and Tylenol # 3.  You should reduce your use of these medications as your pain improves.  You may be prescribed an anal ointment (such as Americain, Tronothane, or Xylocaine). Apply the ointment to the anal area with your finger, then cover the area with cotton or gauze.  Stool softeners (Miralax) are to be taken in a glass of water once in the morning with increased water intake throughout the day.   Bowel function:   It is important to have soft bowel movements at least every other day and to keep your stool soft. Constipation and/or diarrhea can make the pain worse and must be avoided.   Constipation:  You should have a bowel movement at least every other day.  If two days pass without one, take one tablespoon of milk of magnesia; if there is no result, repeat this dose in six hours.   Diarrhea:  Diarrhea, usually caused by the overuse of laxatives, is also a concern. If you have more than three watery bowel movements during a 24 hour period, stop taking milk of magnesia or laxatives.  If diarrhea persists, call your doctor.   Bathing:  After bowel movements, use a wet washcloth, toilet paper or cotton to clean yourself.  If possible take a sitz bath or tub bath immediately. Baths should last between 10-15 minutes with the water as warm as you can comfortably tolerate. Try to take at least three sitz baths (or showers with hand-held sprayer) every day.    Discharge/Infection:  Bloody discharge after bowel movements is normal and may last 2 to 4 weeks after your surgery. However, if you have bleeding between bowel movements that doesn't stop, call the office.  Urination:  If you have trouble urinating, do so while sitting in a tub of water, or run the water faucet while sitting on the toilet. If you are unable to urinate 6-8 hours after surgery, call the office.  Diet:  A high fiber diet, including at least four servings of fruits or vegetables daily, will help to keep your bowel movements regular and soft. It is important to drink six to eight glasses of water or juice everyday when using fiber products.   Activity:    Activity as tolerated. After some surgeries, you may be told not to perform any lifting (more than 10 pounds) for several weeks after surgery.    Call the office if you have:  Fever greater than 101   Chills   Foul-smelling drainage   Nausea and vomiting   Diarrhea - greater than 3 water stools in 24 hours   Constipation - no bowel movement for 3 days   Severe bleeding that does not stop soon after a bowel movement   Problems with the incision, including increased pain, swelling, redness, or drainage.  Difficulty urinating

## 2019-05-01 NOTE — OR NURSING
There is order for pt to void prior to discharge. Pt states he self caths 2-3 time per day. Pt states he will self cath as soon as he gets home.

## 2019-05-01 NOTE — OP NOTE
Procedure Date: 05/01/2019      PREOPERATIVE DIAGNOSIS:  Anal pain.      POSTOPERATIVE DIAGNOSES:   1.  Fissure fistula.   2.  Anal skin tag.      PROCEDURES:   1.  Examination under anesthesia.   2.  Fistulotomy with division of muscle.   3.  Excision of anal skin tag.      STAFF SURGEON:  Tiburcio Barros MD.      FIRST ASSISTANT:  Brittany Begum MD, [facility] Colorectal Surgery Fellow      ANESTHESIA:  General.      ESTIMATED BLOOD LOSS:  Minimal.      SPECIMENS:  Anal skin tag.      INDICATIONS:  Jose is a 58-year-old gentleman with incomplete paraplegia.  He has been having issues of anal pain and a bothersome skin tag.  Office examination was concerning for a possible fissure or fistula.  He presents now for an examination under anesthesia, possible excision of skin tag, and possible fistulotomy.  The risks, benefits and alternatives of surgery including the risk of bleeding, infection, recurrence and alteration in control of gas and liquid stool were discussed.  He wished to proceed.      DESCRIPTION OF PROCEDURE:  After obtaining informed consent, the patient was brought to the operating room and general anesthesia was induced.  He was placed in prone jackknife position on the operating table.  All pressure points were inspected and padded appropriately.  Buttocks were spread with tape.  Perineum was prepped and draped in the usual sterile fashion.  A timeout was undertaken, which identified the patient and correct procedure.  Digital examination was concerning for a large fissure in the posterior midline.  No masses were palpated.  There was an external opening in the right posterior position with an associated skin tag in the adjacent skin at the anal verge.  Anoscopy and passage of a fistula probe from the external opening confirmed the superficial fistula in the posterior midline.  A fissure was seen in the posterior midline that was not active; and therefore, a fissure fistula.  We  therefore incised the overlying skin, as well as a small portion of the internal sphincter muscle overlying the probe, thus performing a fistulotomy with division of muscle.  The 2 associated skin tags in the area were excised and sent off for routine pathology.  All this was done with electrocautery.  Hemostasis was achieved.  Thirty mL of 0.5% Marcaine plain was infiltrated for local anesthesia.  A dressing was applied.  He was turned supine, awakened, extubated and transferred to the PACU in stable condition.  Lap, sponge and needle counts correct at the end of the case x2.         TIBURCIO BARROS MD             D: 2019   T: 2019   MT:       Name:     DEVIN BHARDWAJ   MRN:      [id number]        Account:        [id number]   :      1960           Procedure Date: 2019      Document: [id number]       cc: Tiburcio Barros MD       Primary Care Physician

## 2019-05-02 LAB — COPATH REPORT: NORMAL

## 2019-09-29 ENCOUNTER — HEALTH MAINTENANCE LETTER (OUTPATIENT)
Age: 59
End: 2019-09-29

## 2020-03-15 ENCOUNTER — HEALTH MAINTENANCE LETTER (OUTPATIENT)
Age: 60
End: 2020-03-15

## 2021-01-14 ENCOUNTER — HEALTH MAINTENANCE LETTER (OUTPATIENT)
Age: 61
End: 2021-01-14

## 2021-05-08 ENCOUNTER — HEALTH MAINTENANCE LETTER (OUTPATIENT)
Age: 61
End: 2021-05-08

## 2021-10-23 ENCOUNTER — HEALTH MAINTENANCE LETTER (OUTPATIENT)
Age: 61
End: 2021-10-23

## 2022-10-10 ENCOUNTER — HEALTH MAINTENANCE LETTER (OUTPATIENT)
Age: 62
End: 2022-10-10

## 2023-05-13 ENCOUNTER — HOSPITAL ENCOUNTER (OUTPATIENT)
Facility: CLINIC | Age: 63
Setting detail: OBSERVATION
Discharge: HOME HEALTH | End: 2023-05-17
Attending: EMERGENCY MEDICINE | Admitting: INTERNAL MEDICINE
Payer: COMMERCIAL

## 2023-05-13 DIAGNOSIS — L89.329 PRESSURE INJURY OF SKIN OF LEFT BUTTOCK, UNSPECIFIED INJURY STAGE: ICD-10-CM

## 2023-05-13 DIAGNOSIS — G89.29 CHRONIC BUTTOCK PAIN: ICD-10-CM

## 2023-05-13 DIAGNOSIS — M79.18 CHRONIC BUTTOCK PAIN: ICD-10-CM

## 2023-05-13 DIAGNOSIS — G82.20 PARAPLEGIA (H): ICD-10-CM

## 2023-05-13 LAB
ANION GAP SERPL CALCULATED.3IONS-SCNC: 11 MMOL/L (ref 7–15)
BASOPHILS # BLD AUTO: 0.1 10E3/UL (ref 0–0.2)
BASOPHILS NFR BLD AUTO: 1 %
BUN SERPL-MCNC: 16.1 MG/DL (ref 8–23)
CALCIUM SERPL-MCNC: 8.8 MG/DL (ref 8.8–10.2)
CHLORIDE SERPL-SCNC: 101 MMOL/L (ref 98–107)
CREAT SERPL-MCNC: 0.39 MG/DL (ref 0.67–1.17)
DEPRECATED HCO3 PLAS-SCNC: 26 MMOL/L (ref 22–29)
EOSINOPHIL # BLD AUTO: 0.8 10E3/UL (ref 0–0.7)
EOSINOPHIL NFR BLD AUTO: 9 %
ERYTHROCYTE [DISTWIDTH] IN BLOOD BY AUTOMATED COUNT: 23.8 % (ref 10–15)
GFR SERPL CREATININE-BSD FRML MDRD: >90 ML/MIN/1.73M2
GLUCOSE SERPL-MCNC: 90 MG/DL (ref 70–99)
HCT VFR BLD AUTO: 36.4 % (ref 40–53)
HGB BLD-MCNC: 11.3 G/DL (ref 13.3–17.7)
IMM GRANULOCYTES # BLD: 0 10E3/UL
IMM GRANULOCYTES NFR BLD: 0 %
LYMPHOCYTES # BLD AUTO: 1.2 10E3/UL (ref 0.8–5.3)
LYMPHOCYTES NFR BLD AUTO: 13 %
MCH RBC QN AUTO: 23.2 PG (ref 26.5–33)
MCHC RBC AUTO-ENTMCNC: 31 G/DL (ref 31.5–36.5)
MCV RBC AUTO: 75 FL (ref 78–100)
MONOCYTES # BLD AUTO: 0.9 10E3/UL (ref 0–1.3)
MONOCYTES NFR BLD AUTO: 9 %
NEUTROPHILS # BLD AUTO: 6.5 10E3/UL (ref 1.6–8.3)
NEUTROPHILS NFR BLD AUTO: 68 %
NRBC # BLD AUTO: 0 10E3/UL
NRBC BLD AUTO-RTO: 0 /100
PLATELET # BLD AUTO: 352 10E3/UL (ref 150–450)
POTASSIUM SERPL-SCNC: 3.6 MMOL/L (ref 3.4–5.3)
RBC # BLD AUTO: 4.87 10E6/UL (ref 4.4–5.9)
SODIUM SERPL-SCNC: 138 MMOL/L (ref 136–145)
WBC # BLD AUTO: 9.6 10E3/UL (ref 4–11)

## 2023-05-13 PROCEDURE — 36415 COLL VENOUS BLD VENIPUNCTURE: CPT | Performed by: EMERGENCY MEDICINE

## 2023-05-13 PROCEDURE — 250N000013 HC RX MED GY IP 250 OP 250 PS 637: Performed by: EMERGENCY MEDICINE

## 2023-05-13 PROCEDURE — 99285 EMERGENCY DEPT VISIT HI MDM: CPT | Mod: 25

## 2023-05-13 PROCEDURE — 85025 COMPLETE CBC W/AUTO DIFF WBC: CPT | Performed by: EMERGENCY MEDICINE

## 2023-05-13 PROCEDURE — 82310 ASSAY OF CALCIUM: CPT | Performed by: EMERGENCY MEDICINE

## 2023-05-13 PROCEDURE — G0378 HOSPITAL OBSERVATION PER HR: HCPCS

## 2023-05-13 PROCEDURE — 250N000013 HC RX MED GY IP 250 OP 250 PS 637: Performed by: INTERNAL MEDICINE

## 2023-05-13 RX ORDER — CIPROFLOXACIN 500 MG/1
500 TABLET, FILM COATED ORAL EVERY 12 HOURS SCHEDULED
Status: DISCONTINUED | OUTPATIENT
Start: 2023-05-13 | End: 2023-05-17 | Stop reason: HOSPADM

## 2023-05-13 RX ORDER — OXYCODONE HYDROCHLORIDE 5 MG/1
15 TABLET ORAL ONCE
Status: COMPLETED | OUTPATIENT
Start: 2023-05-13 | End: 2023-05-13

## 2023-05-13 RX ORDER — OXYCODONE HYDROCHLORIDE 5 MG/1
20 TABLET ORAL 2 TIMES DAILY
Status: DISCONTINUED | OUTPATIENT
Start: 2023-05-13 | End: 2023-05-17 | Stop reason: HOSPADM

## 2023-05-13 RX ORDER — CLONAZEPAM 0.5 MG/1
1 TABLET ORAL EVERY 6 HOURS PRN
Status: DISCONTINUED | OUTPATIENT
Start: 2023-05-13 | End: 2023-05-17 | Stop reason: HOSPADM

## 2023-05-13 RX ORDER — OXYCODONE HYDROCHLORIDE 5 MG/1
15 TABLET ORAL EVERY 4 HOURS PRN
Status: DISCONTINUED | OUTPATIENT
Start: 2023-05-13 | End: 2023-05-17 | Stop reason: HOSPADM

## 2023-05-13 RX ADMIN — OXYCODONE HYDROCHLORIDE 15 MG: 5 TABLET ORAL at 19:55

## 2023-05-13 RX ADMIN — OXYCODONE HYDROCHLORIDE 20 MG: 5 TABLET ORAL at 23:50

## 2023-05-13 ASSESSMENT — ENCOUNTER SYMPTOMS
WOUND: 1
FEVER: 0

## 2023-05-13 ASSESSMENT — ACTIVITIES OF DAILY LIVING (ADL)
ADLS_ACUITY_SCORE: 35
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
## 2023-05-13 NOTE — ED PROVIDER NOTES
History     Chief Complaint:  WOUND CARE       The history is provided by the patient and the EMS personnel.      Jose Lopez is a 62 year old male on Eliquis with a history of paraplegia and wheelchair-bound, DVT, pressure injury of skin on buttock who presents with wound on left hip/buttock and pain at site. The patient states that he was at assisted living for wound care but then went home where he was getting nurses aid until recently, now having increased pain at wound site. He states he has been taking Oxycodone for the pain and mentions he has been recently diagnosed with a UTI. The patient denies any fever.    Independent Historian:   EMS state the patient has been seen at [facility] the last 2 nights and mention they get frequent calls from the patients residence.    Review of External Notes: I reviewed emergency department visit notes from [facility] on 5/11/2023 and on 5/12/2023.  Patient was started on ciprofloxacin for a urinary tract infection.  Cultures appear to be pending.  I reviewed the [place] prescription drug monitoring database.  The patient was prescribed oxycodone 20 mg x 40 on 4/27.  He was prescribed oxycodone 50 mg x 27 on 4/24.  He was prescribed oxycodone 20 mg x 20 on 4/17.  He was prescribed oxycodone 20 mg x 30 on 4/11.  I reviewed the family medicine note from May 9.  The patient called and that he was out of oxycodone.  Reported that he could not have this filled until May 17.  I reviewed the nursing home note from his most recent stay in April at [facility].    ROS:  Review of Systems   Constitutional: Negative for fever.   Skin: Positive for wound.   All other systems reviewed and are negative.    Allergies:   Sertraline   Nitrofurantoin     Medications:    Lioresal   Colace   Neurontin   Ditropan   Zantac   Viagra   Eliquis    Cymbalta   Cipro   Roxicodone      Past Medical History:    Benzodiazepine dependence   Muscle spasticity   Neurogenic bladder    Paraplegia   Sepsis   Spinal cord injury   Substance abuse   UTI  Calculus of gallbladder    Altered mental status   C. Diff   Iron deficiency anemia   Hypoxia   DVT  Pressure injury of skin of buttock   Hypokalemia   Parotitis   Cellulitis   LATONYA  Anal fissure     Past Surgical History:    Back surgery   Cholecystectomy   Fistulotomy rectum   Shoulder surgery   Tibia fracture surgery     Family History:    Alcoholism   Septicemia     Social History:   reports that he has never smoked. He has never used smokeless tobacco. He reports that he does not drink alcohol and does not use drugs.   Patient is DNR  PCP: Sung Hollis     Physical Exam     Patient Vitals for the past 24 hrs:   BP Temp Temp src Pulse Resp SpO2 Weight   05/13/23 2200 100/64 -- -- 68 -- -- --   05/13/23 2130 121/72 -- -- 67 18 98 % --   05/13/23 2100 122/78 -- -- 66 20 95 % --   05/13/23 1838 118/62 98.2  F (36.8  C) Temporal 66 18 100 % 70.3 kg (155 lb)      Physical Exam  General: Well-nourished, appears to be in pain  Eyes: PERRL, conjunctivae pink no scleral icterus or conjunctival injection  ENT:  Moist mucus membranes, posterior oropharynx clear without erythema or exudates  Respiratory:  Lungs clear to auscultation bilaterally, no crackles/rubs/wheezes.  Good air movement  CV: Normal rate and rhythm, no murmurs/rubs/gallops  GI:  Abdomen soft and non-distended.  Normoactive BS.  No tenderness, guarding or rebound  Skin: Left buttocks with pressure ulcer that erodes through the epidermis.  Moist with light thin film of white exudate. Not foul smelling. No significant surrounding cellulitis.  Warm, dry.  No rashes or petechiae  Musculoskeletal: No peripheral edema or calf tenderness  Neuro: Alert and oriented to person/place/time  Psychiatric: Normal affect      Emergency Department Course     Laboratory:  Labs Ordered and Resulted from Time of ED Arrival to Time of ED Departure   BASIC METABOLIC PANEL - Abnormal       Result Value     Sodium 138      Potassium 3.6      Chloride 101      Carbon Dioxide (CO2) 26      Anion Gap 11      Urea Nitrogen 16.1      Creatinine 0.39 (*)     Calcium 8.8      Glucose 90      GFR Estimate >90     CBC WITH PLATELETS AND DIFFERENTIAL - Abnormal    WBC Count 9.6      RBC Count 4.87      Hemoglobin 11.3 (*)     Hematocrit 36.4 (*)     MCV 75 (*)     MCH 23.2 (*)     MCHC 31.0 (*)     RDW 23.8 (*)     Platelet Count 352      % Neutrophils 68      % Lymphocytes 13      % Monocytes 9      % Eosinophils 9      % Basophils 1      % Immature Granulocytes 0      NRBCs per 100 WBC 0      Absolute Neutrophils 6.5      Absolute Lymphocytes 1.2      Absolute Monocytes 0.9      Absolute Eosinophils 0.8 (*)     Absolute Basophils 0.1      Absolute Immature Granulocytes 0.0      Absolute NRBCs 0.0        Emergency Department Course & Assessments:       Interventions:  Medications   oxyCODONE (ROXICODONE) tablet 15 mg (15 mg Oral $Given 5/13/23 1955)      Assessments:  1839 I consulted with the patient and obtained history as shown above  2104 I rechecked the patient and explained exam results   2213 I rechecked the patient     Independent Interpretation (X-rays, CTs, rhythm strip):  None    Consultations/Discussion of Management or Tests:  2209 I consulted with Dr. Redmond about the patient and plan of care    Social Determinants of Health affecting care:   Healthcare Access/Compliance, Transportation and paraplegia    Disposition:  The patient was admitted to the hospital under the care of Dr. Redmond .     Impression & Plan    CMS Diagnoses: None    Medical Decision Making:  Jose Lopez is a 62 year old male with a history of paraplegia who comes with a wound on his left buttocks.  He does not appear to be septic.  The wound is apparently worse than it had been when he was discharged from the TCU and he has not been able to get appropriate wound care.  He is unable to change and dress the wound himself given his  paraplegia as well as the location of the wound.  He is being treated with antibiotics already for urinary tract infection.  I do not believe he needs additional antibiotics.  We attempted to contact the TCU where he had previously been staying to see if they had availability for him to be admitted for ongoing wound care.  They are unable to take him tonight.  He will be admitted to the hospital for wound care and placement or to arrange for skilled nursing as he will need assistance with frequent dressing changes and other measures to prevent this decubitus ulcer from progressing.    Diagnosis:    ICD-10-CM    1. Pressure injury of skin of left buttock, unspecified injury stage  L89.329 Wound Care Referral      2. Chronic buttock pain  M79.18 Wound Care Referral    G89.29       3. Paraplegia (H)  G82.20            Discharge Medications:  New Prescriptions    No medications on file      Scribe Disclosure:  SONJA, Jnaes Villeda, am serving as a scribe at 6:49 PM on 5/13/2023 to document services personally performed by Savannah De La Cruz MD based on my observations and the provider's statements to me.     5/13/2023   Savannah De La Cruz MD Cho, Amy C, MD  05/14/23 0044

## 2023-05-14 PROCEDURE — 250N000013 HC RX MED GY IP 250 OP 250 PS 637: Performed by: PHYSICIAN ASSISTANT

## 2023-05-14 PROCEDURE — 250N000013 HC RX MED GY IP 250 OP 250 PS 637: Performed by: INTERNAL MEDICINE

## 2023-05-14 PROCEDURE — 250N000013 HC RX MED GY IP 250 OP 250 PS 637: Performed by: HOSPITALIST

## 2023-05-14 PROCEDURE — 99233 SBSQ HOSP IP/OBS HIGH 50: CPT | Performed by: HOSPITALIST

## 2023-05-14 PROCEDURE — G0378 HOSPITAL OBSERVATION PER HR: HCPCS

## 2023-05-14 RX ORDER — ONDANSETRON 2 MG/ML
4 INJECTION INTRAMUSCULAR; INTRAVENOUS EVERY 6 HOURS PRN
Status: DISCONTINUED | OUTPATIENT
Start: 2023-05-14 | End: 2023-05-17 | Stop reason: HOSPADM

## 2023-05-14 RX ORDER — NALOXONE HYDROCHLORIDE 0.4 MG/ML
0.4 INJECTION, SOLUTION INTRAMUSCULAR; INTRAVENOUS; SUBCUTANEOUS
Status: DISCONTINUED | OUTPATIENT
Start: 2023-05-14 | End: 2023-05-17 | Stop reason: HOSPADM

## 2023-05-14 RX ORDER — PREGABALIN 100 MG/1
100 CAPSULE ORAL 2 TIMES DAILY
Status: ON HOLD | COMMUNITY
End: 2023-05-25

## 2023-05-14 RX ORDER — FERROUS SULFATE 325(65) MG
325 TABLET ORAL
COMMUNITY

## 2023-05-14 RX ORDER — CLONAZEPAM 0.5 MG/1
1 TABLET ORAL EVERY 6 HOURS
Status: DISCONTINUED | OUTPATIENT
Start: 2023-05-14 | End: 2023-05-17 | Stop reason: HOSPADM

## 2023-05-14 RX ORDER — OXYBUTYNIN CHLORIDE 5 MG/1
10 TABLET ORAL 2 TIMES DAILY
Status: DISCONTINUED | OUTPATIENT
Start: 2023-05-14 | End: 2023-05-17 | Stop reason: HOSPADM

## 2023-05-14 RX ORDER — NALOXONE HYDROCHLORIDE 0.4 MG/ML
0.2 INJECTION, SOLUTION INTRAMUSCULAR; INTRAVENOUS; SUBCUTANEOUS
Status: DISCONTINUED | OUTPATIENT
Start: 2023-05-14 | End: 2023-05-17 | Stop reason: HOSPADM

## 2023-05-14 RX ORDER — SIMETHICONE 125 MG
125 TABLET,CHEWABLE ORAL 3 TIMES DAILY PRN
Status: ON HOLD | COMMUNITY
End: 2024-04-08

## 2023-05-14 RX ORDER — ACETAMINOPHEN 650 MG/1
650 SUPPOSITORY RECTAL EVERY 6 HOURS PRN
Status: DISCONTINUED | OUTPATIENT
Start: 2023-05-14 | End: 2023-05-17 | Stop reason: HOSPADM

## 2023-05-14 RX ORDER — CIPROFLOXACIN 500 MG/1
500 TABLET, FILM COATED ORAL 2 TIMES DAILY
Status: ON HOLD | COMMUNITY
End: 2023-05-16

## 2023-05-14 RX ORDER — SIMETHICONE 80 MG
80 TABLET,CHEWABLE ORAL 3 TIMES DAILY
Status: ON HOLD | COMMUNITY
End: 2023-05-14

## 2023-05-14 RX ORDER — ACETAMINOPHEN 325 MG/1
650 TABLET ORAL EVERY 6 HOURS PRN
Status: DISCONTINUED | OUTPATIENT
Start: 2023-05-14 | End: 2023-05-17 | Stop reason: HOSPADM

## 2023-05-14 RX ORDER — VANCOMYCIN HYDROCHLORIDE 125 MG/1
125 CAPSULE ORAL
Status: DISCONTINUED | OUTPATIENT
Start: 2023-05-14 | End: 2023-05-17 | Stop reason: HOSPADM

## 2023-05-14 RX ORDER — FOLIC ACID 1 MG/1
1 TABLET ORAL DAILY
COMMUNITY

## 2023-05-14 RX ORDER — OXYCODONE HYDROCHLORIDE 20 MG/1
20 TABLET ORAL 2 TIMES DAILY
Status: ON HOLD | COMMUNITY
End: 2023-05-16

## 2023-05-14 RX ORDER — SENNOSIDES 8.6 MG
1 TABLET ORAL 2 TIMES DAILY
Status: DISCONTINUED | OUTPATIENT
Start: 2023-05-14 | End: 2023-05-17 | Stop reason: HOSPADM

## 2023-05-14 RX ORDER — SIMETHICONE 80 MG
80 TABLET,CHEWABLE ORAL 3 TIMES DAILY
Status: DISCONTINUED | OUTPATIENT
Start: 2023-05-14 | End: 2023-05-17 | Stop reason: HOSPADM

## 2023-05-14 RX ORDER — DULOXETIN HYDROCHLORIDE 60 MG/1
60 CAPSULE, DELAYED RELEASE ORAL DAILY
Status: ON HOLD | COMMUNITY
End: 2023-05-25

## 2023-05-14 RX ORDER — ONDANSETRON 4 MG/1
4 TABLET, ORALLY DISINTEGRATING ORAL EVERY 6 HOURS PRN
Status: DISCONTINUED | OUTPATIENT
Start: 2023-05-14 | End: 2023-05-17 | Stop reason: HOSPADM

## 2023-05-14 RX ORDER — VANCOMYCIN HYDROCHLORIDE 125 MG/1
125 CAPSULE ORAL
Status: ON HOLD | COMMUNITY
End: 2023-05-25

## 2023-05-14 RX ORDER — DULOXETIN HYDROCHLORIDE 30 MG/1
60 CAPSULE, DELAYED RELEASE ORAL DAILY
Status: DISCONTINUED | OUTPATIENT
Start: 2023-05-14 | End: 2023-05-17 | Stop reason: HOSPADM

## 2023-05-14 RX ORDER — FERROUS SULFATE 325(65) MG
325 TABLET ORAL
Status: DISCONTINUED | OUTPATIENT
Start: 2023-05-15 | End: 2023-05-17 | Stop reason: HOSPADM

## 2023-05-14 RX ORDER — CLONAZEPAM 1 MG/1
1 TABLET ORAL EVERY 6 HOURS
Status: ON HOLD | COMMUNITY
End: 2023-05-25

## 2023-05-14 RX ORDER — OXYCODONE HYDROCHLORIDE 15 MG/1
15 TABLET ORAL EVERY 4 HOURS PRN
Status: ON HOLD | COMMUNITY
End: 2023-05-16

## 2023-05-14 RX ORDER — SENNOSIDES A AND B 8.6 MG/1
1 TABLET, FILM COATED ORAL 2 TIMES DAILY
Status: ON HOLD | COMMUNITY
End: 2024-04-08

## 2023-05-14 RX ORDER — LANOLIN ALCOHOL/MO/W.PET/CERES
3 CREAM (GRAM) TOPICAL
Status: DISCONTINUED | OUTPATIENT
Start: 2023-05-14 | End: 2023-05-17 | Stop reason: HOSPADM

## 2023-05-14 RX ORDER — PREGABALIN 100 MG/1
100 CAPSULE ORAL 2 TIMES DAILY
Status: DISCONTINUED | OUTPATIENT
Start: 2023-05-14 | End: 2023-05-17 | Stop reason: HOSPADM

## 2023-05-14 RX ADMIN — CIPROFLOXACIN HYDROCHLORIDE 500 MG: 500 TABLET, FILM COATED ORAL at 00:49

## 2023-05-14 RX ADMIN — OXYCODONE HYDROCHLORIDE 15 MG: 5 TABLET ORAL at 14:36

## 2023-05-14 RX ADMIN — SIMETHICONE CHEW TAB 80 MG 80 MG: 80 TABLET ORAL at 20:12

## 2023-05-14 RX ADMIN — Medication 1 MG: at 00:49

## 2023-05-14 RX ADMIN — CLONAZEPAM 1 MG: 0.5 TABLET ORAL at 10:26

## 2023-05-14 RX ADMIN — MELATONIN TAB 3 MG 3 MG: 3 TAB at 23:40

## 2023-05-14 RX ADMIN — CLONAZEPAM 1 MG: 0.5 TABLET ORAL at 00:49

## 2023-05-14 RX ADMIN — OXYBUTYNIN CHLORIDE 10 MG: 5 TABLET ORAL at 12:50

## 2023-05-14 RX ADMIN — BACLOFEN 30 MG: 10 TABLET ORAL at 20:11

## 2023-05-14 RX ADMIN — DULOXETINE HYDROCHLORIDE 60 MG: 30 CAPSULE, DELAYED RELEASE ORAL at 10:21

## 2023-05-14 RX ADMIN — APIXABAN 5 MG: 5 TABLET, FILM COATED ORAL at 00:49

## 2023-05-14 RX ADMIN — VANCOMYCIN HYDROCHLORIDE 125 MG: 125 CAPSULE ORAL at 16:10

## 2023-05-14 RX ADMIN — CLONAZEPAM 1 MG: 0.5 TABLET ORAL at 18:08

## 2023-05-14 RX ADMIN — SENNOSIDES 1 TABLET: 8.6 TABLET, FILM COATED ORAL at 20:11

## 2023-05-14 RX ADMIN — OXYCODONE HYDROCHLORIDE 15 MG: 5 TABLET ORAL at 00:49

## 2023-05-14 RX ADMIN — BACLOFEN 30 MG: 10 TABLET ORAL at 12:51

## 2023-05-14 RX ADMIN — CLONAZEPAM 1 MG: 0.5 TABLET ORAL at 12:51

## 2023-05-14 RX ADMIN — CIPROFLOXACIN HYDROCHLORIDE 500 MG: 500 TABLET, FILM COATED ORAL at 20:12

## 2023-05-14 RX ADMIN — OXYCODONE HYDROCHLORIDE 20 MG: 5 TABLET ORAL at 20:12

## 2023-05-14 RX ADMIN — APIXABAN 5 MG: 5 TABLET, FILM COATED ORAL at 08:25

## 2023-05-14 RX ADMIN — OXYBUTYNIN CHLORIDE 10 MG: 5 TABLET ORAL at 20:12

## 2023-05-14 RX ADMIN — OXYCODONE HYDROCHLORIDE 15 MG: 5 TABLET ORAL at 05:07

## 2023-05-14 RX ADMIN — CLONAZEPAM 1 MG: 0.5 TABLET ORAL at 23:40

## 2023-05-14 RX ADMIN — OXYCODONE HYDROCHLORIDE 20 MG: 5 TABLET ORAL at 08:25

## 2023-05-14 RX ADMIN — SIMETHICONE CHEW TAB 80 MG 80 MG: 80 TABLET ORAL at 14:36

## 2023-05-14 RX ADMIN — PREGABALIN 100 MG: 100 CAPSULE ORAL at 20:12

## 2023-05-14 RX ADMIN — CIPROFLOXACIN HYDROCHLORIDE 500 MG: 500 TABLET, FILM COATED ORAL at 08:25

## 2023-05-14 RX ADMIN — APIXABAN 5 MG: 5 TABLET, FILM COATED ORAL at 20:12

## 2023-05-14 RX ADMIN — BACLOFEN 30 MG: 10 TABLET ORAL at 14:35

## 2023-05-14 RX ADMIN — OXYCODONE HYDROCHLORIDE 15 MG: 5 TABLET ORAL at 10:21

## 2023-05-14 ASSESSMENT — ACTIVITIES OF DAILY LIVING (ADL)
ADLS_ACUITY_SCORE: 38
ADLS_ACUITY_SCORE: 39
ADLS_ACUITY_SCORE: 36
ADLS_ACUITY_SCORE: 39
ADLS_ACUITY_SCORE: 31
ADLS_ACUITY_SCORE: 31
ADLS_ACUITY_SCORE: 41
ADLS_ACUITY_SCORE: 31
ADLS_ACUITY_SCORE: 36
ADLS_ACUITY_SCORE: 41
ADLS_ACUITY_SCORE: 31
ADLS_ACUITY_SCORE: 36

## 2023-05-14 NOTE — PLAN OF CARE
Goal Outcome Evaluation:    Orientation/Cognitive: A&O x4  Observation Goals (Met/ Not Met): Not met  Mobility Level/Assist Equipment: W/c bound, Lift T&R  Fall Risk (Y/N): Y  Behavior Concerns: NA  Pain Management: reports L hip and rectal pain - reports baseline pain is 5/10, rating pain 6-8/10.  Tele/VS/O2: VSS, cont. Pulse ox WNL.   ABNL Lab/BG: no labs  Diet: Regular  Bowel/Bladder: Neurogenic bladder Straight cath q4-6h per pt, neurogenic bowel  Skin Concerns: Wound to L hip with small dime sized open area, mepilex in place, as well as coccyx, R hip pea sized red [place]-not open  Drains/Devices: PIV:S/L  Tests/Procedures for next shift: WOC, SW consult   Anticipated DC date & active delays:1-2 days pending placement.   Patient Stated Goal for Today: Pain control

## 2023-05-14 NOTE — PHARMACY-ADMISSION MEDICATION HISTORY
Pharmacy Intern Admission Medication History    Admission medication history is complete. The information provided in this note is only as accurate as the sources available at the time of the update.    Medication reconciliation/reorder completed by provider prior to medication history? Yes    Information Source(s): Patient and CareEverywhere/SureScripts via in-person    Pertinent Information: Patient states he is taking several medications including Eliquis, oxycodone, and Klonopin although there are no recent fill histories and denies fill at any other pharmacies. Seems as though there may be an issue with compliance. Additionally he states he will be out of his oxycodone because it cannot be filled until 5/20.     Changes made to PTA medication list:    Added: None    Deleted: None    Changed: None    Medication Affordability: Not including over the counter (OTC) medications, was there a time in the past 3 months where you did not take your medication as prescribed because of cost? No        Allergies reviewed with patient and updates made in EHR: yes    Medication History Completed By: Teresa Duran 5/14/2023 10:30 AM    Prior to Admission medications    Medication Sig Last Dose Taking? Auth Provider Long Term End Date   apixaban ANTICOAGULANT (ELIQUIS) 5 MG tablet Take 5 mg by mouth 2 times daily Past Week at 5/12 am Yes Unknown, Entered By History     baclofen (LIORESAL) 20 MG tablet Take 20 mg by mouth 3 times daily At 0900, 1100, 1500, 2100. Past Week at 5/12 am Yes Reported, Patient     ciprofloxacin (CIPRO) 500 MG tablet Take 500 mg by mouth 2 times daily Past Week at 5/12 am Yes Unknown, Entered By History     clonazePAM (KLONOPIN) 1 MG tablet Take 1 mg by mouth every 6 hours Past Week at 5/12 am Yes Unknown, Entered By History No    DULoxetine (CYMBALTA) 60 MG capsule Take 60 mg by mouth daily Past Week at 5/12 am Yes Unknown, Entered By History Yes    ferrous sulfate (FEROSUL) 325 (65 Fe) MG tablet  Take 325 mg by mouth daily (with breakfast) Past Week at 5/12 am Yes Unknown, Entered By History     folic acid (FOLVITE) 1 MG tablet Take 1 mg by mouth daily Past Week at 5/12 am Yes Unknown, Entered By History     naloxone (NARCAN) 4 MG/0.1ML nasal spray Spray 4 mg into one nostril alternating nostrils as needed for opioid reversal every 2-3 minutes until assistance arrives  at prn Yes Unknown, Entered By History     oxybutynin (DITROPAN) 5 MG tablet Take 10 mg by mouth 2 times daily Past Week at 5/12 am Yes Unknown, Entered By History     oxyCODONE HCl (ROXICODONE) 20 MG TABS immediate release tablet Take 20 mg by mouth 2 times daily Past Week at 5/12 am Yes Unknown, Entered By History     oxyCODONE IR (ROXICODONE) 15 MG tablet Take 15 mg by mouth every 4 hours as needed for severe pain Past Week at 5/12 am Yes Unknown, Entered By History     pregabalin (LYRICA) 100 MG capsule Take 100 mg by mouth 2 times daily Past Week at 5/12 am Yes Unknown, Entered By History Yes    senna (SENOKOT) 8.6 MG tablet Take 1 tablet by mouth 2 times daily Past Week at 5/12 am Yes Unknown, Entered By History     simethicone (MYLICON) 125 MG chewable tablet Take 125 mg by mouth 3 times daily Past Week at 5/12 am Yes Unknown, Entered By History     vancomycin (VANCOCIN) 125 MG capsule Take 125 mg by mouth every 72 hours Past Week at 5/11 am Yes Unknown, Entered By History

## 2023-05-14 NOTE — ED NOTES
[facility]  ED Nurse Handoff Report    ED Chief complaint: WOUND CARE      ED Diagnosis:   Final diagnoses:   Pressure injury of skin of left buttock, unspecified injury stage   Chronic buttock pain   Paraplegia (H)       Code Status: MD need to assess     Allergies:   Allergies   Allergen Reactions     No Known Allergies      Sertraline Other (See Comments)     Other reaction(s): Gastrointestinal  Other reaction(s): Gastrointestinal       Patient Story: Patient states he needs wound care for his PI Left buttock.  Focused Assessment:  L buttock wound    Treatments and/or interventions provided: Oxycodone given for pain, BMP CBC done  Patient's response to treatments and/or interventions: Pain medication help per patient    To be done/followed up on inpatient unit: Continue plan of care, Follow up wound care    Does this patient have any cognitive concerns?: None    Activity level - Baseline/Home:  Wheelchair bound  Activity Level - Current:   Wheelchair bound    Patient's Preferred language: English   Needed?: No    Isolation: None  Infection: Not Applicable  Patient tested for COVID 19 prior to admission: NO  Bariatric?: No  Labs Ordered and Resulted from Time of ED Arrival to Time of ED Departure   BASIC METABOLIC PANEL - Abnormal       Result Value    Sodium 138      Potassium 3.6      Chloride 101      Carbon Dioxide (CO2) 26      Anion Gap 11      Urea Nitrogen 16.1      Creatinine 0.39 (*)     Calcium 8.8      Glucose 90      GFR Estimate >90     CBC WITH PLATELETS AND DIFFERENTIAL - Abnormal    WBC Count 9.6      RBC Count 4.87      Hemoglobin 11.3 (*)     Hematocrit 36.4 (*)     MCV 75 (*)     MCH 23.2 (*)     MCHC 31.0 (*)     RDW 23.8 (*)     Platelet Count 352      % Neutrophils 68      % Lymphocytes 13      % Monocytes 9      % Eosinophils 9      % Basophils 1      % Immature Granulocytes 0      NRBCs per 100 WBC 0      Absolute Neutrophils 6.5      Absolute Lymphocytes 1.2       Absolute Monocytes 0.9      Absolute Eosinophils 0.8 (*)     Absolute Basophils 0.1      Absolute Immature Granulocytes 0.0      Absolute NRBCs 0.0       Vital Signs:   Vitals:    05/13/23 1838 05/13/23 2100 05/13/23 2130 05/13/23 2200   BP: 118/62 122/78 121/72 100/64   Pulse: 66 66 67 68   Resp: 18 20 18    Temp: 98.2  F (36.8  C)      TempSrc: Temporal      SpO2: 100% 95% 98%    Weight: 70.3 kg (155 lb)          Cardiac Rhythm:     Was the PSS-3 completed:   Yes  What interventions are required if any?               Family Comments: None  OBS brochure/video discussed/provided to patient/family: Yes              Name of person given brochure if not patient:               Relationship to patient:     For the majority of the shift this patient's behavior was Green.   Behavioral interventions performed were  none.    ED NURSE PHONE NUMBER: *[phone]

## 2023-05-14 NOTE — DISCHARGE INSTRUCTIONS
*You may resume diet and activities.  *No new medications as prescribed.  Continue your current medications  *Return if you develop fever, vomiting, faint or feel like you will faint or become worse in any way.
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
## 2023-05-14 NOTE — PROGRESS NOTES
Observation goals  PRIOR TO DISCHARGE        Comments:   -diagnostic tests and consults completed and resulted:Not met - WOC and SW consults pending  -vital signs normal or at patient baseline:met  -adequate pain control on oral analgesics:Not met   -safe disposition plan has been identified:Not met   Nurse to notify provider when observation goals have been met and patient is ready for discharge.

## 2023-05-14 NOTE — PROGRESS NOTES
Observation goals  PRIOR TO DISCHARGE        Comments: -diagnostic tests and consults completed and resulted Not Met  -vital signs normal or at patient baseline Met  -adequate pain control on oral analgesics Not Met  -safe disposition plan has been identified Not Met  Nurse to notify provider when observation goals have been met and patient is ready for discharge.

## 2023-05-14 NOTE — PROGRESS NOTES
RECEIVING UNIT ED HANDOFF REVIEW    ED Nurse Handoff Report was reviewed by: Zenia Montoya RN on May 13, 2023 at 11:39 PM

## 2023-05-14 NOTE — PROGRESS NOTES
Observation goals  PRIOR TO DISCHARGE        Comments:   -diagnostic tests and consults completed and resulted:Not met   -vital signs normal or at patient baseline:met  -adequate pain control on oral analgesics:Not met   -safe disposition plan has been identified:Not met   Nurse to notify provider when observation goals have been met and patient is ready for discharge.

## 2023-05-14 NOTE — PLAN OF CARE
Goal Outcome Evaluation:      Plan of Care Reviewed With: patient    Overall Patient Progress: improvingOverall Patient Progress: improving     Orientation/Cognitive: A&Ox4  Observation Goals (Met/ Not Met): Not met  Mobility Level/Assist Equipment: W/c bound, Lift T/R  Fall Risk (Y/N): Y  Behavior Concerns: NA  Pain Management: PRN oxy x2 for 8/10 L hip and rectum  pain   Tele/VS/O2: VSS  ABNL Lab/BG: no labs  Diet: Regular  Bowel/Bladder: Neurogenic bladder Straight cath q4-6h, neurogenic bowel  Skin Concerns: Wound to L hip mepilex in place, as well as coccyx, R hip pea sized red [place]-not open  Drains/Devices: PIV:S/L  Tests/Procedures for next shift: WOC, SW consult   Anticipated DC date & active delays:1-2 days  Patient Stated Goal for Today: Pain control      Observation goals  PRIOR TO DISCHARGE        Comments:   -diagnostic tests and consults completed and resulted:Not met   -vital signs normal or at patient baseline:met  -adequate pain control on oral analgesics: met   -safe disposition plan has been identified:Not met   Nurse to notify provider when observation goals have been met and patient is ready for discharge

## 2023-05-14 NOTE — PLAN OF CARE
Goal Outcome Evaluation:      Plan of Care Reviewed With: patient    Overall Patient Progress: no changeOverall Patient Progress: no change    Orientation/Cognitive: A&Ox4  Observation Goals (Met/ Not Met): Not met  Mobility Level/Assist Equipment: Wheelchair bound, Lift T/R  Fall Risk (Y/N): Y  Behavior Concerns: NA  Pain Management: PRN oxyx2 for 8/10 L hip and rectum  pain   Tele/VS/O2: VSS  ABNL Lab/BG: Creat:0.39 Hgb:11.3  Diet: Regular  Bowel/Bladder: Neurogenic bladder Straight cath q4-6h, neurogenic bowel  Skin Concerns: Wound to left hip mepilex in place  Drains/Devices: PIV:S/L  Tests/Procedures for next shift: WOC, SW consult   Anticipated DC date & active delays:1-2 days  Patient Stated Goal for Today: Pain control   Other: Upon admission, when changing the patient, RN removed the socks and noticed a 1/4 of a straw that was in his sock. Pt became defensive when RN mentioned the straw and was wondering how the straw got there since he cant even reach down to his legs.       Observation goals  PRIOR TO DISCHARGE        Comments:   -diagnostic tests and consults completed and resulted:Not met   -vital signs normal or at patient baseline:met  -adequate pain control on oral analgesics:Not met   -safe disposition plan has been identified:Not met   Nurse to notify provider when observation goals have been met and patient is ready for discharge

## 2023-05-14 NOTE — ED NOTES
Called [facility], Hailee MONTEJO states nobody can do admission this time. Need to follow up on AM. MD notified.
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
## 2023-05-15 ENCOUNTER — TELEPHONE (OUTPATIENT)
Dept: WOUND CARE | Facility: CLINIC | Age: 63
End: 2023-05-15
Payer: COMMERCIAL

## 2023-05-15 PROCEDURE — G0463 HOSPITAL OUTPT CLINIC VISIT: HCPCS

## 2023-05-15 PROCEDURE — G0378 HOSPITAL OBSERVATION PER HR: HCPCS

## 2023-05-15 PROCEDURE — 250N000013 HC RX MED GY IP 250 OP 250 PS 637: Performed by: INTERNAL MEDICINE

## 2023-05-15 PROCEDURE — 99233 SBSQ HOSP IP/OBS HIGH 50: CPT | Performed by: HOSPITALIST

## 2023-05-15 PROCEDURE — 250N000013 HC RX MED GY IP 250 OP 250 PS 637: Performed by: HOSPITALIST

## 2023-05-15 RX ORDER — POLYETHYLENE GLYCOL 3350 17 G/17G
17 POWDER, FOR SOLUTION ORAL DAILY
Status: DISCONTINUED | OUTPATIENT
Start: 2023-05-15 | End: 2023-05-17 | Stop reason: HOSPADM

## 2023-05-15 RX ADMIN — PREGABALIN 100 MG: 100 CAPSULE ORAL at 07:55

## 2023-05-15 RX ADMIN — BACLOFEN 30 MG: 10 TABLET ORAL at 12:51

## 2023-05-15 RX ADMIN — POLYETHYLENE GLYCOL 3350 17 G: 17 POWDER, FOR SOLUTION ORAL at 12:52

## 2023-05-15 RX ADMIN — SENNOSIDES 1 TABLET: 8.6 TABLET, FILM COATED ORAL at 20:50

## 2023-05-15 RX ADMIN — FERROUS SULFATE TAB 325 MG (65 MG ELEMENTAL FE) 325 MG: 325 (65 FE) TAB at 12:51

## 2023-05-15 RX ADMIN — SIMETHICONE CHEW TAB 80 MG 80 MG: 80 TABLET ORAL at 20:49

## 2023-05-15 RX ADMIN — APIXABAN 5 MG: 5 TABLET, FILM COATED ORAL at 20:49

## 2023-05-15 RX ADMIN — CIPROFLOXACIN HYDROCHLORIDE 500 MG: 500 TABLET, FILM COATED ORAL at 07:56

## 2023-05-15 RX ADMIN — BACLOFEN 30 MG: 10 TABLET ORAL at 16:38

## 2023-05-15 RX ADMIN — OXYBUTYNIN CHLORIDE 10 MG: 5 TABLET ORAL at 07:55

## 2023-05-15 RX ADMIN — PREGABALIN 100 MG: 100 CAPSULE ORAL at 20:49

## 2023-05-15 RX ADMIN — OXYCODONE HYDROCHLORIDE 15 MG: 5 TABLET ORAL at 18:33

## 2023-05-15 RX ADMIN — SIMETHICONE CHEW TAB 80 MG 80 MG: 80 TABLET ORAL at 07:55

## 2023-05-15 RX ADMIN — OXYCODONE HYDROCHLORIDE 20 MG: 5 TABLET ORAL at 22:04

## 2023-05-15 RX ADMIN — BACLOFEN 30 MG: 10 TABLET ORAL at 20:48

## 2023-05-15 RX ADMIN — CIPROFLOXACIN HYDROCHLORIDE 500 MG: 500 TABLET, FILM COATED ORAL at 20:49

## 2023-05-15 RX ADMIN — SIMETHICONE CHEW TAB 80 MG 80 MG: 80 TABLET ORAL at 12:56

## 2023-05-15 RX ADMIN — SENNOSIDES 1 TABLET: 8.6 TABLET, FILM COATED ORAL at 07:56

## 2023-05-15 RX ADMIN — OXYCODONE HYDROCHLORIDE 15 MG: 5 TABLET ORAL at 12:50

## 2023-05-15 RX ADMIN — OXYCODONE HYDROCHLORIDE 20 MG: 5 TABLET ORAL at 07:56

## 2023-05-15 RX ADMIN — CLONAZEPAM 1 MG: 0.5 TABLET ORAL at 18:33

## 2023-05-15 RX ADMIN — BACLOFEN 30 MG: 10 TABLET ORAL at 08:00

## 2023-05-15 RX ADMIN — APIXABAN 5 MG: 5 TABLET, FILM COATED ORAL at 07:56

## 2023-05-15 RX ADMIN — CLONAZEPAM 1 MG: 0.5 TABLET ORAL at 07:56

## 2023-05-15 RX ADMIN — DULOXETINE HYDROCHLORIDE 60 MG: 30 CAPSULE, DELAYED RELEASE ORAL at 07:56

## 2023-05-15 RX ADMIN — CLONAZEPAM 1 MG: 0.5 TABLET ORAL at 12:52

## 2023-05-15 RX ADMIN — OXYBUTYNIN CHLORIDE 10 MG: 5 TABLET ORAL at 20:49

## 2023-05-15 ASSESSMENT — ACTIVITIES OF DAILY LIVING (ADL)
ADLS_ACUITY_SCORE: 39
ADLS_ACUITY_SCORE: 38
ADLS_ACUITY_SCORE: 39
ADLS_ACUITY_SCORE: 38
ADLS_ACUITY_SCORE: 38
DEPENDENT_IADLS:: TRANSPORTATION
ADLS_ACUITY_SCORE: 39
ADLS_ACUITY_SCORE: 38
ADLS_ACUITY_SCORE: 39
ADLS_ACUITY_SCORE: 38
ADLS_ACUITY_SCORE: 39

## 2023-05-15 NOTE — PROGRESS NOTES
Observation Goals:     -diagnostic tests and consults completed and resulted -Not met     -vital signs normal or at patient baseline - Met     -adequate pain control on oral analgesics - Met.controlled with scheduled medicine      -safe disposition plan has been identified - Not met     -Nurse to notify provider when observation goals have been met and patient is ready for discharge - not yet.
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
## 2023-05-15 NOTE — PROGRESS NOTES
Shift: 0153-2916  Orientation/Cognitive: AOx4  Observation Goals (Met/ Not Met): partially met, see notes below.  Mobility Level/Assist Equipment: A2 lift and A1 with turning.  Fall Risk (Y/N): yes  Behavior Concerns: calm and cooperative  Pain Management: complains of back and left hip pain 8/10 - controlled with scheduled analgesic.  Tele/VS/O2: VS stable and on room air.  ABNL Lab/BG: no labs taken.  Diet: regular  Bowel/Bladder: incontinent BB, neurogenic bladder - straight catheterization is needed.  Skin Concerns: wound on left hip covered with foam dressing. WOC consult ordered.  Drains/Devices: PIV SL  Tests/Procedures for next shift: none  Anticipated DC date & active delays: TBD, awaiting WOC and SW consult.        Observation Goals:    -diagnostic tests and consults completed and resulted - in progress.    -vital signs normal or at patient baseline - VS stable and on room air.    -adequate pain control on oral analgesics - controlled with scheduled analgesic.    -safe disposition plan has been identified - not yet.    -Nurse to notify provider when observation goals have been met and patient is ready for discharge - not yet.

## 2023-05-15 NOTE — TELEPHONE ENCOUNTER
Consult received via Workqueue from Savannah De La Cruz MD for wound of the IT and Toe    Please schedule with Celeste Brooks or Dr. Grigsby at [facility] [place] for next available appointment.    **If scheduling with Celeste CAMPA or Dr. Rascon please schedule a follow up 2-3 weeks after initial appointment.    Is the patient able to make their own medical decisions? yes    Is patient a ARI lift? PLEASE INQUIRE WHEN MAKING THE APPOINTMENT AND PUT IN APPOINTMENT NOTES    Routing to  Wound Healing Scheduling.

## 2023-05-15 NOTE — PROGRESS NOTES
Observation Goals:     -Diagnostic tests and consults completed and resulted: Not met     -Vital signs normal or at patient baseline: Partially met, soft BP.      -Adequate pain control on oral analgesics: Met. Using scheduled and PRN pain meds    -Safe disposition plan has been identified: Not met     -Nurse to notify provider when observation goals have been met and patient is ready for discharge - not yet.

## 2023-05-15 NOTE — PROVIDER NOTIFICATION
MD Notification    Notified Person: MD    Notified Person Name: ROGER BRYANT PAC    Notification Date/Time: 5/14/23 2051     Notification Interaction: amcom    Purpose of Notification: [address]  Pt asking for higher dose of Melatonin, he said, Melatonin 1mg does not have any effect. Can I have a higher dose of Melatonin pls. thanks Melly MONTEJO [phone]    Orders Received: Melatonin 3mg PRN    Comments:

## 2023-05-15 NOTE — PROGRESS NOTES
Orientation/Cognitive: AOx4  Observation Goals (Met/ Not Met): Not met  Mobility Level/Assist Equipment: A2 lift and A1 with turning.  Fall Risk (Y/N): yes  Behavior Concerns: Flat affect, frustrated, stating that he wants to sell his house and move to [place] to be with her sister who will take care of him .  Pain Management: Scheduled pain meds and PRN oxy given w/ relief.   Tele/VS/O2: Soft BP, other VSS on RA.  ABNL Lab/BG: No labs today.  Diet: regular  Bowel/Bladder: incontinent BB, neurogenic bladder - straight cath x2 this shift.   Skin Concerns: wound on left hip covered with foam dressing, done by [facility] Nurse today.  Drains/Devices: PIV SL  Tests/Procedures for next shift: none  Anticipated DC date & active delays: TBD, pending clinical improvement. PT rec TCU.

## 2023-05-15 NOTE — PROGRESS NOTES
Observation Goals:    -diagnostic tests and consults completed and resulted - in progress.    -vital signs normal or at patient baseline - VS stable and on room air.    -adequate pain control on oral analgesics - controlled with scheduled medicine     -safe disposition plan has been identified - not yet.    -Nurse to notify provider when observation goals have been met and patient is ready for discharge - not yet.

## 2023-05-15 NOTE — PROGRESS NOTES
Observation Goals:     -diagnostic tests and consults completed and resulted -Not met     -vital signs normal or at patient baseline - Partially met, soft BP.      -adequate pain control on oral analgesics - Met.controlled with scheduled medicine      -safe disposition plan has been identified - Not met     -Nurse to notify provider when observation goals have been met and patient is ready for discharge - not yet.
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
## 2023-05-16 PROCEDURE — G0378 HOSPITAL OBSERVATION PER HR: HCPCS

## 2023-05-16 PROCEDURE — 99232 SBSQ HOSP IP/OBS MODERATE 35: CPT | Performed by: HOSPITALIST

## 2023-05-16 PROCEDURE — 250N000013 HC RX MED GY IP 250 OP 250 PS 637: Performed by: HOSPITALIST

## 2023-05-16 PROCEDURE — 250N000013 HC RX MED GY IP 250 OP 250 PS 637: Performed by: INTERNAL MEDICINE

## 2023-05-16 RX ORDER — BACLOFEN 20 MG/1
20 TABLET ORAL 3 TIMES DAILY
Status: DISCONTINUED | OUTPATIENT
Start: 2023-05-16 | End: 2023-05-17 | Stop reason: HOSPADM

## 2023-05-16 RX ORDER — OXYCODONE HYDROCHLORIDE 15 MG/1
15 TABLET ORAL EVERY 4 HOURS PRN
Qty: 10 TABLET | Refills: 0 | Status: ON HOLD | OUTPATIENT
Start: 2023-05-16 | End: 2023-05-25

## 2023-05-16 RX ORDER — OXYCODONE HYDROCHLORIDE 20 MG/1
20 TABLET ORAL 2 TIMES DAILY
Qty: 5 TABLET | Refills: 0 | Status: ON HOLD | OUTPATIENT
Start: 2023-05-16 | End: 2023-05-25

## 2023-05-16 RX ORDER — CIPROFLOXACIN 500 MG/1
500 TABLET, FILM COATED ORAL 2 TIMES DAILY
Qty: 4 TABLET | Refills: 0 | Status: SHIPPED | OUTPATIENT
Start: 2023-05-16 | End: 2023-05-18

## 2023-05-16 RX ADMIN — CLONAZEPAM 1 MG: 0.5 TABLET ORAL at 17:53

## 2023-05-16 RX ADMIN — PREGABALIN 100 MG: 100 CAPSULE ORAL at 20:21

## 2023-05-16 RX ADMIN — CLONAZEPAM 1 MG: 0.5 TABLET ORAL at 06:08

## 2023-05-16 RX ADMIN — CLONAZEPAM 1 MG: 0.5 TABLET ORAL at 14:20

## 2023-05-16 RX ADMIN — DULOXETINE HYDROCHLORIDE 60 MG: 30 CAPSULE, DELAYED RELEASE ORAL at 08:35

## 2023-05-16 RX ADMIN — CIPROFLOXACIN HYDROCHLORIDE 500 MG: 500 TABLET, FILM COATED ORAL at 20:21

## 2023-05-16 RX ADMIN — SENNOSIDES 1 TABLET: 8.6 TABLET, FILM COATED ORAL at 08:35

## 2023-05-16 RX ADMIN — BACLOFEN 20 MG: 20 TABLET ORAL at 20:21

## 2023-05-16 RX ADMIN — BACLOFEN 20 MG: 20 TABLET ORAL at 14:20

## 2023-05-16 RX ADMIN — SIMETHICONE CHEW TAB 80 MG 80 MG: 80 TABLET ORAL at 20:21

## 2023-05-16 RX ADMIN — CLONAZEPAM 1 MG: 0.5 TABLET ORAL at 01:53

## 2023-05-16 RX ADMIN — PREGABALIN 100 MG: 100 CAPSULE ORAL at 08:35

## 2023-05-16 RX ADMIN — OXYCODONE HYDROCHLORIDE 15 MG: 5 TABLET ORAL at 16:51

## 2023-05-16 RX ADMIN — OXYBUTYNIN CHLORIDE 10 MG: 5 TABLET ORAL at 08:34

## 2023-05-16 RX ADMIN — FERROUS SULFATE TAB 325 MG (65 MG ELEMENTAL FE) 325 MG: 325 (65 FE) TAB at 08:35

## 2023-05-16 RX ADMIN — OXYCODONE HYDROCHLORIDE 20 MG: 5 TABLET ORAL at 20:20

## 2023-05-16 RX ADMIN — SIMETHICONE CHEW TAB 80 MG 80 MG: 80 TABLET ORAL at 08:34

## 2023-05-16 RX ADMIN — BACLOFEN 30 MG: 10 TABLET ORAL at 08:35

## 2023-05-16 RX ADMIN — APIXABAN 5 MG: 5 TABLET, FILM COATED ORAL at 08:35

## 2023-05-16 RX ADMIN — OXYCODONE HYDROCHLORIDE 20 MG: 5 TABLET ORAL at 08:34

## 2023-05-16 RX ADMIN — OXYBUTYNIN CHLORIDE 10 MG: 5 TABLET ORAL at 20:20

## 2023-05-16 RX ADMIN — SENNOSIDES 1 TABLET: 8.6 TABLET, FILM COATED ORAL at 20:21

## 2023-05-16 RX ADMIN — APIXABAN 5 MG: 5 TABLET, FILM COATED ORAL at 20:21

## 2023-05-16 RX ADMIN — SIMETHICONE CHEW TAB 80 MG 80 MG: 80 TABLET ORAL at 14:20

## 2023-05-16 RX ADMIN — CIPROFLOXACIN HYDROCHLORIDE 500 MG: 500 TABLET, FILM COATED ORAL at 08:34

## 2023-05-16 RX ADMIN — POLYETHYLENE GLYCOL 3350 17 G: 17 POWDER, FOR SOLUTION ORAL at 08:34

## 2023-05-16 ASSESSMENT — ACTIVITIES OF DAILY LIVING (ADL)
ADLS_ACUITY_SCORE: 39
ADLS_ACUITY_SCORE: 41
ADLS_ACUITY_SCORE: 39
ADLS_ACUITY_SCORE: 39
ADLS_ACUITY_SCORE: 41
ADLS_ACUITY_SCORE: 39
ADLS_ACUITY_SCORE: 41
ADLS_ACUITY_SCORE: 41
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
## 2023-05-16 NOTE — PROGRESS NOTES
Observation Goals:     -Diagnostic tests and consults completed and resulted: Met     -Vital signs normal or at patient baseline: Met     -Adequate pain control on oral analgesics: Met    -Safe disposition plan has been identified: Partially met, SW following      -Nurse to notify provider when observation goals have been met and patient is ready for discharge

## 2023-05-16 NOTE — PROGRESS NOTES
Goal Outcome Evaluation     Orientation/Cognitive: A+Ox4  Observation Goals (Met/ Not Met): Not met  Mobility Level/Assist Equipment: A2 lift. A1 with repositioning in bed. PT rec TCU.  Fall Risk (Y/N): Y  Behavior Concerns: Cooperative. Frustrated, stating that he wants to sell his house and move to [place] to be with her sister who will take care of him .  Pain Management: Scheduled pain meds and PRN oxy given w/ relief.   Tele/VS/O2: Soft BP, other VSS on RA.  ABNL Lab/BG: No labs today.  Diet: Regular  Bowel/Bladder: Incontinent bowel. Straight cath x 2 (300 ml and 200 ml) for neurogenic bladder.   Skin Concerns: Mepilex on left hip wound is CDI. WOCN is following.   Drains/Devices: PIV SL  Tests/Procedures for next shift: None  Anticipated DC date & active delays: TBD, pending clinical improvement.      Observation Goals:     -Diagnostic tests and consults completed and resulted: Not met     -Vital signs normal or at patient baseline: Partially met, soft BP.      -Adequate pain control on oral analgesics: Met. Using scheduled and PRN pain meds     -Safe disposition plan has been identified: Not met     -Nurse to notify provider when observation goals have been met and patient is ready for discharge - not yet.

## 2023-05-16 NOTE — PROGRESS NOTES
Observation Goals:     -Diagnostic tests and consults completed and resulted: Not met     -Vital signs normal or at patient baseline: Met     -Adequate pain control on oral analgesics: Partially met    -Safe disposition plan has been identified: Not met     -Nurse to notify provider when observation goals have been met and patient is ready for discharge

## 2023-05-16 NOTE — PROGRESS NOTES
Goal Outcome Evaluation     Orientation/Cognitive: A+Ox4  Observation Goals (Met/ Not Met): Not met  Mobility Level/Assist Equipment: A2 lift. A1 with repositioning in bed. PT rec TCU.  Fall Risk (Y/N): Y  Behavior Concerns: None  Pain Management: Scheduled pain   Tele/VS/O2: Soft BP, other VSS on RA.  ABNL Lab/BG: none  Diet: Regular  Bowel/Bladder: Incontinent bowel. Straight cath x 1 (700 ML)  for neurogenic bladder.   Skin Concerns: Mepilex on left hip wound is CDI. WOCN is following.   Drains/Devices: PIV SL  Tests/Procedures for next shift: None  Anticipated DC date & active delays: TBD  Observation Goals:Sleep     -Diagnostic tests and consults completed and resulted: Not met     -Vital signs normal or at patient baseline: Partially met, soft BP.      -Adequate pain control on oral analgesics: Met. Using scheduled and PRN pain meds     -Safe disposition plan has been identified: Not met     -Nurse to notify provider when observation goals have been met and patient is ready for discharge - not yet.

## 2023-05-16 NOTE — PLAN OF CARE
Goal Outcome Evaluation:    Orientation/Cognitive: AOx4, forgetful at times  Observation Goals (Met/ Not Met): Not met  Mobility Level/Assist Equipment: A2/Lift/Repo. Wheelchair baseline  Fall Risk (Y/N): Y  Behavior Concerns: Green  Pain Management: C/o L hip wound pain, repositioned. Scheduled and PRN pain medications given  Tele/VS/O2: VSS on RA  ABNL Lab/BG: See results  Diet: Regular  Bowel/Bladder: Urinary retention; Straight cathed x 1; No BM throughout shift   Skin Concerns: Wound to L hip dressing changed, mepilex CDI  Drains/Devices: PIV SL  Tests/Procedures for next shift: None  Anticipated DC date & active delays: TBD, SW following for discharge planning, home care to be set-up.

## 2023-05-16 NOTE — PROGRESS NOTES
Care Management Follow Up    Length of Stay (days): 0    Expected Discharge Date: 05/17/2023     Concerns to be Addressed:       Patient plan of care discussed at interdisciplinary rounds: Yes    Anticipated Discharge Disposition: Home Care     Anticipated Discharge Services:    Anticipated Discharge DME:      Patient/family educated on Medicare website which has current facility and service quality ratings:    Education Provided on the Discharge Plan:    Patient/Family in Agreement with the Plan: yes    Referrals Placed by CM/SW:  Home care hub referral  Private pay costs discussed: Not applicable    Additional Information:  Kd sent referral to home care hub for home care. Patient told writer,( I really just want to go home). Writer was notified that patient was accepted by [facility] but when writer looks at home care tab it says declined. Writer asked for clarification by home care.  Unsure of patient has been accepted yet.    Luisa Atkins RN

## 2023-05-16 NOTE — TELEPHONE ENCOUNTER
Called patient to offer scheduling but there was no answer and the mailbox was full. No voicemail was left.

## 2023-05-17 VITALS
OXYGEN SATURATION: 98 % | SYSTOLIC BLOOD PRESSURE: 100 MMHG | HEART RATE: 64 BPM | HEIGHT: 70 IN | BODY MASS INDEX: 18.31 KG/M2 | TEMPERATURE: 98.4 F | DIASTOLIC BLOOD PRESSURE: 58 MMHG | RESPIRATION RATE: 18 BRPM | WEIGHT: 127.87 LBS

## 2023-05-17 PROCEDURE — G0378 HOSPITAL OBSERVATION PER HR: HCPCS

## 2023-05-17 PROCEDURE — 99239 HOSP IP/OBS DSCHRG MGMT >30: CPT | Performed by: HOSPITALIST

## 2023-05-17 PROCEDURE — 250N000013 HC RX MED GY IP 250 OP 250 PS 637: Performed by: INTERNAL MEDICINE

## 2023-05-17 PROCEDURE — 250N000013 HC RX MED GY IP 250 OP 250 PS 637: Performed by: HOSPITALIST

## 2023-05-17 RX ADMIN — SIMETHICONE CHEW TAB 80 MG 80 MG: 80 TABLET ORAL at 07:59

## 2023-05-17 RX ADMIN — SIMETHICONE CHEW TAB 80 MG 80 MG: 80 TABLET ORAL at 14:20

## 2023-05-17 RX ADMIN — CIPROFLOXACIN HYDROCHLORIDE 500 MG: 500 TABLET, FILM COATED ORAL at 07:59

## 2023-05-17 RX ADMIN — OXYBUTYNIN CHLORIDE 10 MG: 5 TABLET ORAL at 08:00

## 2023-05-17 RX ADMIN — PREGABALIN 100 MG: 100 CAPSULE ORAL at 08:00

## 2023-05-17 RX ADMIN — SENNOSIDES 1 TABLET: 8.6 TABLET, FILM COATED ORAL at 07:59

## 2023-05-17 RX ADMIN — BACLOFEN 20 MG: 20 TABLET ORAL at 08:00

## 2023-05-17 RX ADMIN — BACLOFEN 20 MG: 20 TABLET ORAL at 14:20

## 2023-05-17 RX ADMIN — OXYCODONE HYDROCHLORIDE 15 MG: 5 TABLET ORAL at 11:57

## 2023-05-17 RX ADMIN — VANCOMYCIN HYDROCHLORIDE 125 MG: 125 CAPSULE ORAL at 14:20

## 2023-05-17 RX ADMIN — CLONAZEPAM 1 MG: 0.5 TABLET ORAL at 11:57

## 2023-05-17 RX ADMIN — FERROUS SULFATE TAB 325 MG (65 MG ELEMENTAL FE) 325 MG: 325 (65 FE) TAB at 07:59

## 2023-05-17 RX ADMIN — DULOXETINE HYDROCHLORIDE 60 MG: 30 CAPSULE, DELAYED RELEASE ORAL at 07:59

## 2023-05-17 RX ADMIN — POLYETHYLENE GLYCOL 3350 17 G: 17 POWDER, FOR SOLUTION ORAL at 07:59

## 2023-05-17 RX ADMIN — APIXABAN 5 MG: 5 TABLET, FILM COATED ORAL at 07:59

## 2023-05-17 RX ADMIN — OXYCODONE HYDROCHLORIDE 20 MG: 5 TABLET ORAL at 07:59

## 2023-05-17 RX ADMIN — CLONAZEPAM 1 MG: 0.5 TABLET ORAL at 00:12

## 2023-05-17 RX ADMIN — CLONAZEPAM 1 MG: 0.5 TABLET ORAL at 06:50

## 2023-05-17 ASSESSMENT — ACTIVITIES OF DAILY LIVING (ADL)
ADLS_ACUITY_SCORE: 41
ADLS_ACUITY_SCORE: 45
ADLS_ACUITY_SCORE: 41

## 2023-05-17 NOTE — PLAN OF CARE
Observation goals  PRIOR TO DISCHARGE        Comments:   -diagnostic tests and consults completed and resulted: Met   -vital signs normal or at patient baseline: Met  -adequate pain control on oral analgesics: Partially met  -safe disposition plan has been identified: Met  Nurse to notify provider when observation goals have been met and patient is ready for discharge.

## 2023-05-17 NOTE — PLAN OF CARE
Pt A&O, forgetful. VSS. On RA. Pain controlled in L hip/buttocks area with scheduled oxycodone and baclofen, prn oxycodone given x1. Wound dressing change to L buttocks completed, scant drainage. Tolerating reg diet. Self straight caths, completed x1 with 720 output. Up with lift to personal wheelchair. Wheelchair baseline, paraplegic. Ok for discharge home with outpt home care for dressing changes. All discharge instructions, meds, and follow ups reviewed with patient. Med refills sent to [facility] pharmacy. Home with [facility] transport. Wheeled self down to exit via own wheelchair.

## 2023-05-17 NOTE — PROGRESS NOTES
Care Management Follow Up    Length of Stay (days): 0    Expected Discharge Date: 05/17/2023     Concerns to be Addressed:       Patient plan of care discussed at interdisciplinary rounds: Yes    Anticipated Discharge Disposition: Home Care     Anticipated Discharge Services:    Anticipated Discharge DME:      Patient/family educated on Medicare website which has current facility and service quality ratings:    Education Provided on the Discharge Plan:    Patient/Family in Agreement with the Plan: yes    Referrals Placed by CM/SW:    Private pay costs discussed: Not applicable    Additional Information:  Writer set up PCP/hospital follow up appt for pt on 5-26 at [facility] but pt didn't want that location or to wait 8 days so pt called and made his own appt at a [facility] clinic for 5-19 himself. [facility] agency called me in am to see if pt is discharging today and I stated he had discharge orders.  CM will continue to follow for needs.  ADD: Pt requesting transportation to home. Writer arranged ride through [facility] to discharge within the hour. BS RN, Pt and charge RN updated with plan.      Kamari Mullins RN  [facility]  Inpatient Care Management - FLOAT  SUNITHA RN Mobile: [phone] daily 7:30-4:00

## 2023-05-17 NOTE — PROGRESS NOTES
Overnight    Pt hypotensive otherwise vital signs stable on room air. Alert and oriented x4 somewhat forgetful, repeats the same conversation. Ax2 lift. Able to reposition self in bed, needs assistance with lower extremities. No drainage seen from wounds. Regular diet. Neurogenic bladder, occasionally incontinent. straight caths baseline. Reports will let nursing know when to cath. Continued bowel regimen. Contact precautions.
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
## 2023-05-20 ENCOUNTER — HOSPITAL ENCOUNTER (INPATIENT)
Facility: CLINIC | Age: 63
LOS: 5 days | Discharge: NURSING, REHAB OR LONG-TERM CARE | DRG: 690 | End: 2023-05-25
Attending: EMERGENCY MEDICINE | Admitting: INTERNAL MEDICINE
Payer: COMMERCIAL

## 2023-05-20 DIAGNOSIS — G82.20 PARAPLEGIA (H): ICD-10-CM

## 2023-05-20 DIAGNOSIS — R62.7 FAILURE TO THRIVE IN ADULT: ICD-10-CM

## 2023-05-20 DIAGNOSIS — M62.838 MUSCLE SPASTICITY: Primary | ICD-10-CM

## 2023-05-20 DIAGNOSIS — K59.00 CONSTIPATION, UNSPECIFIED CONSTIPATION TYPE: ICD-10-CM

## 2023-05-20 DIAGNOSIS — T83.511A URINARY TRACT INFECTION ASSOCIATED WITH CATHETERIZATION OF URINARY TRACT, UNSPECIFIED INDWELLING URINARY CATHETER TYPE, INITIAL ENCOUNTER (H): ICD-10-CM

## 2023-05-20 DIAGNOSIS — R19.7 DIARRHEA, UNSPECIFIED TYPE: ICD-10-CM

## 2023-05-20 DIAGNOSIS — N39.0 URINARY TRACT INFECTION ASSOCIATED WITH CATHETERIZATION OF URINARY TRACT, UNSPECIFIED INDWELLING URINARY CATHETER TYPE, INITIAL ENCOUNTER (H): ICD-10-CM

## 2023-05-20 DIAGNOSIS — G89.4 CHRONIC PAIN SYNDROME: ICD-10-CM

## 2023-05-20 LAB
ALBUMIN SERPL BCG-MCNC: 4.1 G/DL (ref 3.5–5.2)
ALBUMIN UR-MCNC: 30 MG/DL
ALP SERPL-CCNC: 116 U/L (ref 40–129)
ALT SERPL W P-5'-P-CCNC: 14 U/L (ref 10–50)
ANION GAP SERPL CALCULATED.3IONS-SCNC: 13 MMOL/L (ref 7–15)
APPEARANCE UR: ABNORMAL
AST SERPL W P-5'-P-CCNC: 16 U/L (ref 10–50)
BACTERIA #/AREA URNS HPF: ABNORMAL /HPF
BASOPHILS # BLD AUTO: 0.1 10E3/UL (ref 0–0.2)
BASOPHILS NFR BLD AUTO: 1 %
BILIRUB SERPL-MCNC: 0.4 MG/DL
BILIRUB UR QL STRIP: NEGATIVE
BUN SERPL-MCNC: 15.4 MG/DL (ref 8–23)
CALCIUM SERPL-MCNC: 9.9 MG/DL (ref 8.8–10.2)
CAOX CRY #/AREA URNS HPF: ABNORMAL /HPF
CHLORIDE SERPL-SCNC: 99 MMOL/L (ref 98–107)
COLOR UR AUTO: YELLOW
CREAT SERPL-MCNC: 0.43 MG/DL (ref 0.67–1.17)
DEPRECATED HCO3 PLAS-SCNC: 25 MMOL/L (ref 22–29)
EOSINOPHIL # BLD AUTO: 0.8 10E3/UL (ref 0–0.7)
EOSINOPHIL NFR BLD AUTO: 5 %
ERYTHROCYTE [DISTWIDTH] IN BLOOD BY AUTOMATED COUNT: 23.5 % (ref 10–15)
GFR SERPL CREATININE-BSD FRML MDRD: >90 ML/MIN/1.73M2
GLUCOSE SERPL-MCNC: 95 MG/DL (ref 70–99)
GLUCOSE UR STRIP-MCNC: NEGATIVE MG/DL
HCT VFR BLD AUTO: 40.4 % (ref 40–53)
HGB BLD-MCNC: 12.6 G/DL (ref 13.3–17.7)
HGB UR QL STRIP: NEGATIVE
IMM GRANULOCYTES # BLD: 0.1 10E3/UL
IMM GRANULOCYTES NFR BLD: 0 %
KETONES UR STRIP-MCNC: NEGATIVE MG/DL
LEUKOCYTE ESTERASE UR QL STRIP: ABNORMAL
LYMPHOCYTES # BLD AUTO: 1.3 10E3/UL (ref 0.8–5.3)
LYMPHOCYTES NFR BLD AUTO: 7 %
MCH RBC QN AUTO: 23.3 PG (ref 26.5–33)
MCHC RBC AUTO-ENTMCNC: 31.2 G/DL (ref 31.5–36.5)
MCV RBC AUTO: 75 FL (ref 78–100)
MONOCYTES # BLD AUTO: 1.4 10E3/UL (ref 0–1.3)
MONOCYTES NFR BLD AUTO: 8 %
MUCOUS THREADS #/AREA URNS LPF: PRESENT /LPF
NEUTROPHILS # BLD AUTO: 14.1 10E3/UL (ref 1.6–8.3)
NEUTROPHILS NFR BLD AUTO: 79 %
NITRATE UR QL: NEGATIVE
NRBC # BLD AUTO: 0 10E3/UL
NRBC BLD AUTO-RTO: 0 /100
PH UR STRIP: 7.5 [PH] (ref 5–7)
PLATELET # BLD AUTO: 494 10E3/UL (ref 150–450)
POTASSIUM SERPL-SCNC: 4.3 MMOL/L (ref 3.4–5.3)
PROT SERPL-MCNC: 8 G/DL (ref 6.4–8.3)
RBC # BLD AUTO: 5.4 10E6/UL (ref 4.4–5.9)
RBC URINE: 17 /HPF
SODIUM SERPL-SCNC: 137 MMOL/L (ref 136–145)
SP GR UR STRIP: 1.02 (ref 1–1.03)
SQUAMOUS EPITHELIAL: <1 /HPF
UROBILINOGEN UR STRIP-MCNC: NORMAL MG/DL
WBC # BLD AUTO: 17.7 10E3/UL (ref 4–11)
WBC URINE: >182 /HPF
YEAST #/AREA URNS HPF: ABNORMAL /HPF

## 2023-05-20 PROCEDURE — 250N000011 HC RX IP 250 OP 636: Performed by: INTERNAL MEDICINE

## 2023-05-20 PROCEDURE — 99222 1ST HOSP IP/OBS MODERATE 55: CPT | Mod: AI | Performed by: INTERNAL MEDICINE

## 2023-05-20 PROCEDURE — 36415 COLL VENOUS BLD VENIPUNCTURE: CPT | Performed by: EMERGENCY MEDICINE

## 2023-05-20 PROCEDURE — 250N000013 HC RX MED GY IP 250 OP 250 PS 637: Performed by: EMERGENCY MEDICINE

## 2023-05-20 PROCEDURE — 99285 EMERGENCY DEPT VISIT HI MDM: CPT | Mod: 25

## 2023-05-20 PROCEDURE — 120N000001 HC R&B MED SURG/OB

## 2023-05-20 PROCEDURE — 96360 HYDRATION IV INFUSION INIT: CPT

## 2023-05-20 PROCEDURE — 250N000013 HC RX MED GY IP 250 OP 250 PS 637: Performed by: INTERNAL MEDICINE

## 2023-05-20 PROCEDURE — 85025 COMPLETE CBC W/AUTO DIFF WBC: CPT | Performed by: EMERGENCY MEDICINE

## 2023-05-20 PROCEDURE — 258N000003 HC RX IP 258 OP 636: Performed by: EMERGENCY MEDICINE

## 2023-05-20 PROCEDURE — 81003 URINALYSIS AUTO W/O SCOPE: CPT | Performed by: EMERGENCY MEDICINE

## 2023-05-20 PROCEDURE — 80053 COMPREHEN METABOLIC PANEL: CPT | Performed by: EMERGENCY MEDICINE

## 2023-05-20 PROCEDURE — 87088 URINE BACTERIA CULTURE: CPT | Performed by: EMERGENCY MEDICINE

## 2023-05-20 RX ORDER — ONDANSETRON 2 MG/ML
4 INJECTION INTRAMUSCULAR; INTRAVENOUS EVERY 6 HOURS PRN
Status: DISCONTINUED | OUTPATIENT
Start: 2023-05-20 | End: 2023-05-25 | Stop reason: HOSPADM

## 2023-05-20 RX ORDER — OXYCODONE HYDROCHLORIDE 5 MG/1
15 TABLET ORAL EVERY 4 HOURS PRN
Status: DISCONTINUED | OUTPATIENT
Start: 2023-05-20 | End: 2023-05-25 | Stop reason: HOSPADM

## 2023-05-20 RX ORDER — ACETAMINOPHEN 325 MG/1
650 TABLET ORAL EVERY 6 HOURS PRN
Status: DISCONTINUED | OUTPATIENT
Start: 2023-05-20 | End: 2023-05-25 | Stop reason: HOSPADM

## 2023-05-20 RX ORDER — ACETAMINOPHEN 650 MG/1
650 SUPPOSITORY RECTAL EVERY 6 HOURS PRN
Status: DISCONTINUED | OUTPATIENT
Start: 2023-05-20 | End: 2023-05-25 | Stop reason: HOSPADM

## 2023-05-20 RX ORDER — SIMETHICONE 125 MG
125 TABLET,CHEWABLE ORAL 3 TIMES DAILY
Status: DISCONTINUED | OUTPATIENT
Start: 2023-05-20 | End: 2023-05-20

## 2023-05-20 RX ORDER — SODIUM CHLORIDE 9 MG/ML
INJECTION, SOLUTION INTRAVENOUS CONTINUOUS
Status: DISCONTINUED | OUTPATIENT
Start: 2023-05-20 | End: 2023-05-21

## 2023-05-20 RX ORDER — CEFTRIAXONE 1 G/1
1 INJECTION, POWDER, FOR SOLUTION INTRAMUSCULAR; INTRAVENOUS EVERY 24 HOURS
Status: DISCONTINUED | OUTPATIENT
Start: 2023-05-20 | End: 2023-05-20

## 2023-05-20 RX ORDER — LIDOCAINE 40 MG/G
CREAM TOPICAL
Status: DISCONTINUED | OUTPATIENT
Start: 2023-05-20 | End: 2023-05-25 | Stop reason: HOSPADM

## 2023-05-20 RX ORDER — SIMETHICONE 80 MG
80 TABLET,CHEWABLE ORAL 3 TIMES DAILY
Status: DISCONTINUED | OUTPATIENT
Start: 2023-05-20 | End: 2023-05-25 | Stop reason: HOSPADM

## 2023-05-20 RX ORDER — VANCOMYCIN HYDROCHLORIDE 125 MG/1
125 CAPSULE ORAL 4 TIMES DAILY
Status: DISCONTINUED | OUTPATIENT
Start: 2023-05-20 | End: 2023-05-23

## 2023-05-20 RX ORDER — OXYCODONE HYDROCHLORIDE 5 MG/1
20 TABLET ORAL 2 TIMES DAILY
Status: DISCONTINUED | OUTPATIENT
Start: 2023-05-20 | End: 2023-05-25 | Stop reason: HOSPADM

## 2023-05-20 RX ORDER — OXYBUTYNIN CHLORIDE 5 MG/1
10 TABLET ORAL 2 TIMES DAILY
Status: DISCONTINUED | OUTPATIENT
Start: 2023-05-20 | End: 2023-05-25 | Stop reason: HOSPADM

## 2023-05-20 RX ORDER — ONDANSETRON 4 MG/1
4 TABLET, ORALLY DISINTEGRATING ORAL EVERY 6 HOURS PRN
Status: DISCONTINUED | OUTPATIENT
Start: 2023-05-20 | End: 2023-05-25 | Stop reason: HOSPADM

## 2023-05-20 RX ORDER — PIPERACILLIN SODIUM, TAZOBACTAM SODIUM 3; .375 G/15ML; G/15ML
3.38 INJECTION, POWDER, LYOPHILIZED, FOR SOLUTION INTRAVENOUS EVERY 6 HOURS
Status: DISCONTINUED | OUTPATIENT
Start: 2023-05-20 | End: 2023-05-23

## 2023-05-20 RX ORDER — OXYCODONE HYDROCHLORIDE 5 MG/1
20 TABLET ORAL ONCE
Status: COMPLETED | OUTPATIENT
Start: 2023-05-20 | End: 2023-05-20

## 2023-05-20 RX ORDER — CLONAZEPAM 1 MG/1
1 TABLET ORAL EVERY 6 HOURS
Status: DISCONTINUED | OUTPATIENT
Start: 2023-05-20 | End: 2023-05-25 | Stop reason: HOSPADM

## 2023-05-20 RX ORDER — DULOXETIN HYDROCHLORIDE 60 MG/1
60 CAPSULE, DELAYED RELEASE ORAL DAILY
Status: DISCONTINUED | OUTPATIENT
Start: 2023-05-21 | End: 2023-05-25 | Stop reason: HOSPADM

## 2023-05-20 RX ORDER — BACLOFEN 20 MG/1
20 TABLET ORAL 3 TIMES DAILY
Status: DISCONTINUED | OUTPATIENT
Start: 2023-05-20 | End: 2023-05-25 | Stop reason: HOSPADM

## 2023-05-20 RX ORDER — FERROUS SULFATE 325(65) MG
325 TABLET ORAL
Status: DISCONTINUED | OUTPATIENT
Start: 2023-05-21 | End: 2023-05-25 | Stop reason: HOSPADM

## 2023-05-20 RX ORDER — FOLIC ACID 1 MG/1
1 TABLET ORAL DAILY
Status: DISCONTINUED | OUTPATIENT
Start: 2023-05-21 | End: 2023-05-25 | Stop reason: HOSPADM

## 2023-05-20 RX ORDER — PREGABALIN 100 MG/1
100 CAPSULE ORAL 2 TIMES DAILY
Status: DISCONTINUED | OUTPATIENT
Start: 2023-05-20 | End: 2023-05-25 | Stop reason: HOSPADM

## 2023-05-20 RX ADMIN — OXYBUTYNIN CHLORIDE 10 MG: 5 TABLET ORAL at 21:26

## 2023-05-20 RX ADMIN — VANCOMYCIN HYDROCHLORIDE 125 MG: 125 CAPSULE ORAL at 21:26

## 2023-05-20 RX ADMIN — SODIUM CHLORIDE 1000 ML: 9 INJECTION, SOLUTION INTRAVENOUS at 16:43

## 2023-05-20 RX ADMIN — SODIUM CHLORIDE: 9 INJECTION, SOLUTION INTRAVENOUS at 20:23

## 2023-05-20 RX ADMIN — BACLOFEN 20 MG: 20 TABLET ORAL at 21:26

## 2023-05-20 RX ADMIN — OXYCODONE HYDROCHLORIDE 20 MG: 5 TABLET ORAL at 20:22

## 2023-05-20 RX ADMIN — PIPERACILLIN AND TAZOBACTAM 3.38 G: 3; .375 INJECTION, POWDER, FOR SOLUTION INTRAVENOUS at 21:38

## 2023-05-20 RX ADMIN — PREGABALIN 100 MG: 100 CAPSULE ORAL at 21:44

## 2023-05-20 RX ADMIN — SIMETHICONE 80 MG: 80 TABLET, CHEWABLE ORAL at 21:26

## 2023-05-20 RX ADMIN — CLONAZEPAM 1 MG: 1 TABLET ORAL at 20:22

## 2023-05-20 RX ADMIN — APIXABAN 5 MG: 5 TABLET, FILM COATED ORAL at 20:23

## 2023-05-20 RX ADMIN — Medication 1 MG: at 23:24

## 2023-05-20 RX ADMIN — OXYCODONE HYDROCHLORIDE 20 MG: 5 TABLET ORAL at 15:26

## 2023-05-20 ASSESSMENT — ACTIVITIES OF DAILY LIVING (ADL)
CONCENTRATING,_REMEMBERING_OR_MAKING_DECISIONS_DIFFICULTY: NO
WEAR_GLASSES_OR_BLIND: NO
DRESSING/BATHING_DIFFICULTY: YES
ADLS_ACUITY_SCORE: 35
DRESS: 1-->ASSISTANCE (EQUIPMENT/PERSON) NEEDED (NOT DEVELOPMENTALLY APPROPRIATE)
ADLS_ACUITY_SCORE: 35
TRANSFERRING: 1-->ASSISTANCE (EQUIPMENT/PERSON) NEEDED
DIFFICULTY_EATING/SWALLOWING: NO
TRANSFERRING: 1-->ASSISTANCE (EQUIPMENT/PERSON) NEEDED (NOT DEVELOPMENTALLY APPROPRIATE)
DRESSING/BATHING: BATHING DIFFICULTY, ASSISTANCE 1 PERSON;DRESSING DIFFICULTY, ASSISTANCE 1 PERSON
TOILETING: 1-->ASSISTANCE (EQUIPMENT/PERSON) NEEDED (NOT DEVELOPMENTALLY APPROPRIATE)
ADLS_ACUITY_SCORE: 49
WALKING_OR_CLIMBING_STAIRS_DIFFICULTY: YES
ADLS_ACUITY_SCORE: 35
DOING_ERRANDS_INDEPENDENTLY_DIFFICULTY: YES
DRESS: 1-->ASSISTANCE (EQUIPMENT/PERSON) NEEDED
TOILETING_ISSUES: YES
TOILETING_ASSISTANCE: TOILETING DIFFICULTY, ASSISTANCE 1 PERSON;TOILETING DIFFICULTY, REQUIRES EQUIPMENT
TOILETING: 1-->ASSISTANCE (EQUIPMENT/PERSON) NEEDED
BATHING: 1-->ASSISTANCE NEEDED
ADLS_ACUITY_SCORE: 35
WALKING_OR_CLIMBING_STAIRS: TRANSFERRING DIFFICULTY, DEPENDENT;AMBULATION DIFFICULTY, DEPENDENT;STAIR CLIMBING DIFFICULTY, DEPENDENT
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
## 2023-05-20 NOTE — ED PROVIDER NOTES
"  History     Chief Complaint:  Wound Check and Diarrhea       HPI   Jose Lopez is a 62 year old male with history of paraplegia, neurogenic bladder and bowel, and recurrent C.diff who presents with diarrhea beginning 2 days ago as well as pain around his chronic left buttock wound and rectum. Patient was placed on Vancomycin through 5/26 but states he has not been taking them as he does not have any pills. Denies fever, bloody stool, or vomiting. Patient takes Oxycodone at home but reports he does not have any at this time which is causing increasing pain.    Independent Historian:   Patient and family member report history as above.    Review of External Notes:   I reviewed discharge summary from 5/17/23.    Medications:    Eliquis  Clonazepam  Duloxetine  Ferrous sulfate  Folic acid  Naloxone  Oxybutynin  Oxycodone  Pregabalin  Senna  Simethicone  Vancomycin     Past Medical History:    Benzodiazepine dependence  Muscle spasticity  Neurogenic bladder  Neurogenic bowel  Paraplegia  Sepsis  UTI     Past Surgical History:    Back surgery  Cholecystectomy  Fistulotomy rectum  Orthopedic surgery     Physical Exam     Patient Vitals for the past 24 hrs:   BP Temp Temp src Pulse Resp SpO2 Height Weight   05/20/23 1509 114/66 98.3  F (36.8  C) Oral 70 15 100 % 1.778 m (5' 10\") 72.6 kg (160 lb)        Physical Exam  VS: Reviewed per above  HENT: normal speech  EYES: sclera anicteric  CV: Rate as noted, regular rhythm.   RESP: Effort normal. Breath sounds are normal bilaterally.  GI: no tenderness/rebound/guarding, not distended.  NEURO: Alert, paraplegia, sensory level around the lower abdomen  MSK: No deformity of the extremities  SKIN: Warm and dry, chronic appearing buttock pressure wounds.  No obvious superimposed cellulitic change.      Emergency Department Course       Laboratory:  Labs Ordered and Resulted from Time of ED Arrival to Time of ED Departure   COMPREHENSIVE METABOLIC PANEL - Abnormal       Result " Value    Sodium 137      Potassium 4.3      Chloride 99      Carbon Dioxide (CO2) 25      Anion Gap 13      Urea Nitrogen 15.4      Creatinine 0.43 (*)     Calcium 9.9      Glucose 95      Alkaline Phosphatase 116      AST 16      ALT 14      Protein Total 8.0      Albumin 4.1      Bilirubin Total 0.4      GFR Estimate >90     CBC WITH PLATELETS AND DIFFERENTIAL - Abnormal    WBC Count 17.7 (*)     RBC Count 5.40      Hemoglobin 12.6 (*)     Hematocrit 40.4      MCV 75 (*)     MCH 23.3 (*)     MCHC 31.2 (*)     RDW 23.5 (*)     Platelet Count 494 (*)     % Neutrophils 79      % Lymphocytes 7      % Monocytes 8      % Eosinophils 5      % Basophils 1      % Immature Granulocytes 0      NRBCs per 100 WBC 0      Absolute Neutrophils 14.1 (*)     Absolute Lymphocytes 1.3      Absolute Monocytes 1.4 (*)     Absolute Eosinophils 0.8 (*)     Absolute Basophils 0.1      Absolute Immature Granulocytes 0.1      Absolute NRBCs 0.0     ROUTINE UA WITH MICROSCOPIC REFLEX TO CULTURE   ENTERIC BACTERIA AND VIRUS PANEL BY EBENEZER STOOL   C. DIFFICILE TOXIN B PCR WITH REFLEX TO C. DIFFICILE ANTIGEN AND TOXINS A/B EIA          Emergency Department Course & Assessments:       Interventions:  Medications   0.9% sodium chloride BOLUS (1,000 mLs Intravenous $New Bag 5/20/23 1643)     Followed by   sodium chloride 0.9% infusion (has no administration in time range)   oxyCODONE (ROXICODONE) tablet 20 mg (20 mg Oral $Given 5/20/23 1526)        Assessments:  1515 I entered the patient's room and obtained history.    Consultations/Discussion of Management or Tests:   I consulted with Dr. marshall, hospitalist, regarding the patient's history and presentation here in the emergency department who accepted the patient for admission.        Disposition:  The patient was admitted to the hospital under the care of Dr. marshall.     Impression & Plan      Medical Decision Making:  Patient presents to the ER for evaluation of failure to thrive, uncontrolled  chronic pain, diarrhea.  Vital signs reassuring.  Abdominal exam is benign.  Chronic buttock pressure wounds do not appear to be superinfected.  Labs reveal nonspecific leukocytosis.  Stool and urine studies pending.  Reviewed recent hospital admission with plans for discharge with home care as the patient tells me he still cannot care for himself despite this.  He will be admitted for further management of his new symptoms as well as coordination of cares.    Diagnosis:    ICD-10-CM    1. Failure to thrive in adult  R62.7       2. Diarrhea, unspecified type  R19.7            Discharge Medications:  New Prescriptions    No medications on file          Scribe Disclosure:  Leah CASILLAS Hired, am serving as a scribe at 3:23 PM on 5/20/2023 to document services personally performed by Luis E Currie MD based on my observations and the provider's statements to me.     5/20/2023   Luis E Currie MD Lindenbaum, Elan, MD  05/20/23 8929

## 2023-05-20 NOTE — ED TRIAGE NOTES
Patient was discharged from the hospital 4 days ago. He has no help at home and redeveloped a pressure injury on his left buttock. Patient also complains of diarrhea similar to when he had c-diff.     Triage Assessment     Row Name 05/20/23 1512       Triage Assessment (Adult)    Airway WDL WDL       Respiratory WDL    Respiratory WDL WDL       Skin Circulation/Temperature WDL    Skin Circulation/Temperature WDL WDL       Cardiac WDL    Cardiac WDL WDL       Peripheral/Neurovascular WDL    Peripheral Neurovascular WDL WDL       Cognitive/Neuro/Behavioral WDL    Cognitive/Neuro/Behavioral WDL WDL

## 2023-05-20 NOTE — PROGRESS NOTES
"[facility]  ED Nurse Handoff Report    ED Chief complaint: Wound Check and Diarrhea      ED Diagnosis:   Final diagnoses:   Failure to thrive in adult   Diarrhea, unspecified type       Code Status: to be discussed with MD    Allergies:   Allergies   Allergen Reactions     No Known Allergies      Sertraline Other (See Comments)     Other reaction(s): Gastrointestinal  Other reaction(s): Gastrointestinal       Patient Story: Patient was discharged from the hospital 4 days ago. He has no help at home and redeveloped a pressure injury on his left buttock. Patient also complains of diarrhea similar to when he had c-diff.  Focused Assessment:  Patient has a large pink wound on left buttock that has a few small openings on it. Patient was covered in urine and feces on arrival.    Treatments and/or interventions provided: Labs drawn and medications given.  Patient's response to treatments and/or interventions: Patient tolerated interventions fairly well.    To be done/followed up on inpatient unit:  Follow plan of care. Treat infection. Treat wound.    Does this patient have any cognitive concerns?: Forgetful    Activity level - Baseline/Home:  Stand with Assist and Wheelchair  Activity Level - Current:   Total Care    Patient's Preferred language: English   Needed?: No    Isolation: Contact  and Enteric  Infection: C-Diff Pending  VRE  Patient tested for COVID 19 prior to admission: NO  Bariatric?: No    Vital Signs:   Vitals:    05/20/23 1509   BP: 114/66   BP Location: Right arm   Pulse: 70   Resp: 15   Temp: 98.3  F (36.8  C)   TempSrc: Oral   SpO2: 100%   Weight: 72.6 kg (160 lb)   Height: 1.778 m (5' 10\")       Cardiac Rhythm:     Was the PSS-3 completed:   Yes  What interventions are required if any?  none             Family Comments: n/a  OBS brochure/video discussed/provided to patient/family: No              Name of person given brochure if not patient:               Relationship to " patient:     For the majority of the shift this patient's behavior was Green.   Behavioral interventions performed were redirected, education provided, and emotional support given.    ED NURSE PHONE NUMBER: [phone]

## 2023-05-20 NOTE — ED NOTES
Bed: ED10  Expected date:   Expected time:   Means of arrival:   Comments:  Brayan 520 62M weakness diarrhea cdiff postive ETA 1416

## 2023-05-20 NOTE — PHARMACY-ADMISSION MEDICATION HISTORY
Pharmacist Admission Medication History    Admission medication history is complete. The information provided in this note is only as accurate as the sources available at the time of the update.    Medication reconciliation/reorder completed by provider prior to medication history? No    Information Source(s): [facility] and previous admission medication history note via N/A    Pertinent Information: Patient discharged 4 days ago.  States they have not been taking vancomycin they were discharged with, likely not taking medications as prescribed per Surescripts history (very inconsistent fills).    Changes made to PTA medication list:    Added: None    Deleted: None    Changed: None    Medication Affordability:  Not including over the counter (OTC) medications, was there a time in the past 3 months when you did not take your medications as prescribed because of cost?: Unable to Assess    Allergies reviewed with patient and updates made in EHR: no    Medication History Completed By: Matthew Ramirez [facility] 5/20/2023 6:32 PM    Prior to Admission medications    Medication Sig Last Dose Taking? Auth Provider Long Term End Date   apixaban ANTICOAGULANT (ELIQUIS) 5 MG tablet Take 5 mg by mouth 2 times daily Unknown Yes Unknown, Entered By History     baclofen (LIORESAL) 20 MG tablet Take 20 mg by mouth 3 times daily At 0900, 1100, 1500, 2100. Unknown Yes Reported, Patient     clonazePAM (KLONOPIN) 1 MG tablet Take 1 mg by mouth every 6 hours Unknown Yes Unknown, Entered By History No    DULoxetine (CYMBALTA) 60 MG capsule Take 60 mg by mouth daily Unknown Yes Unknown, Entered By History Yes    ferrous sulfate (FEROSUL) 325 (65 Fe) MG tablet Take 325 mg by mouth daily (with breakfast) Unknown Yes Unknown, Entered By History     folic acid (FOLVITE) 1 MG tablet Take 1 mg by mouth daily Unknown Yes Unknown, Entered By History     oxybutynin (DITROPAN) 5 MG tablet Take 10 mg by mouth 2 times daily Unknown Yes  Unknown, Entered By History     oxyCODONE HCl (ROXICODONE) 20 MG TABS immediate release tablet Take 20 mg by mouth 2 times daily Unknown Yes Shanice Alejandra MD     oxyCODONE IR (ROXICODONE) 15 MG tablet Take 1 tablet (15 mg) by mouth every 4 hours as needed for severe pain Unknown Yes Shanice Alejandra MD     pregabalin (LYRICA) 100 MG capsule Take 100 mg by mouth 2 times daily Unknown Yes Unknown, Entered By History Yes    senna (SENOKOT) 8.6 MG tablet Take 1 tablet by mouth 2 times daily Unknown Yes Unknown, Entered By History     simethicone (MYLICON) 125 MG chewable tablet Take 125 mg by mouth 3 times daily Unknown Yes Unknown, Entered By History     vancomycin (VANCOCIN) 125 MG capsule Take 125 mg by mouth every 72 hours Unknown Yes Unknown, Entered By History     naloxone (NARCAN) 4 MG/0.1ML nasal spray Spray 4 mg into one nostril alternating nostrils as needed for opioid reversal every 2-3 minutes until assistance arrives   Unknown, Entered By History

## 2023-05-21 ENCOUNTER — APPOINTMENT (OUTPATIENT)
Dept: OCCUPATIONAL THERAPY | Facility: CLINIC | Age: 63
DRG: 690 | End: 2023-05-21
Attending: INTERNAL MEDICINE
Payer: COMMERCIAL

## 2023-05-21 LAB
ANION GAP SERPL CALCULATED.3IONS-SCNC: 11 MMOL/L (ref 7–15)
BUN SERPL-MCNC: 18.7 MG/DL (ref 8–23)
CALCIUM SERPL-MCNC: 8.9 MG/DL (ref 8.8–10.2)
CHLORIDE SERPL-SCNC: 105 MMOL/L (ref 98–107)
CREAT SERPL-MCNC: 0.46 MG/DL (ref 0.67–1.17)
DEPRECATED HCO3 PLAS-SCNC: 23 MMOL/L (ref 22–29)
ERYTHROCYTE [DISTWIDTH] IN BLOOD BY AUTOMATED COUNT: 23.2 % (ref 10–15)
GFR SERPL CREATININE-BSD FRML MDRD: >90 ML/MIN/1.73M2
GLUCOSE SERPL-MCNC: 90 MG/DL (ref 70–99)
HCT VFR BLD AUTO: 31 % (ref 40–53)
HGB BLD-MCNC: 9.7 G/DL (ref 13.3–17.7)
MCH RBC QN AUTO: 23.8 PG (ref 26.5–33)
MCHC RBC AUTO-ENTMCNC: 31.3 G/DL (ref 31.5–36.5)
MCV RBC AUTO: 76 FL (ref 78–100)
PLATELET # BLD AUTO: 365 10E3/UL (ref 150–450)
POTASSIUM SERPL-SCNC: 4.1 MMOL/L (ref 3.4–5.3)
RBC # BLD AUTO: 4.07 10E6/UL (ref 4.4–5.9)
SODIUM SERPL-SCNC: 139 MMOL/L (ref 136–145)
WBC # BLD AUTO: 9 10E3/UL (ref 4–11)

## 2023-05-21 PROCEDURE — 85027 COMPLETE CBC AUTOMATED: CPT | Performed by: INTERNAL MEDICINE

## 2023-05-21 PROCEDURE — 97530 THERAPEUTIC ACTIVITIES: CPT | Mod: GO | Performed by: OCCUPATIONAL THERAPIST

## 2023-05-21 PROCEDURE — 258N000003 HC RX IP 258 OP 636: Performed by: HOSPITALIST

## 2023-05-21 PROCEDURE — 258N000003 HC RX IP 258 OP 636: Performed by: EMERGENCY MEDICINE

## 2023-05-21 PROCEDURE — 120N000001 HC R&B MED SURG/OB

## 2023-05-21 PROCEDURE — 250N000013 HC RX MED GY IP 250 OP 250 PS 637: Performed by: INTERNAL MEDICINE

## 2023-05-21 PROCEDURE — 97110 THERAPEUTIC EXERCISES: CPT | Mod: GO | Performed by: OCCUPATIONAL THERAPIST

## 2023-05-21 PROCEDURE — 250N000011 HC RX IP 250 OP 636: Performed by: INTERNAL MEDICINE

## 2023-05-21 PROCEDURE — 36415 COLL VENOUS BLD VENIPUNCTURE: CPT | Performed by: INTERNAL MEDICINE

## 2023-05-21 PROCEDURE — 82310 ASSAY OF CALCIUM: CPT | Performed by: INTERNAL MEDICINE

## 2023-05-21 PROCEDURE — 99232 SBSQ HOSP IP/OBS MODERATE 35: CPT | Performed by: HOSPITALIST

## 2023-05-21 PROCEDURE — 97166 OT EVAL MOD COMPLEX 45 MIN: CPT | Mod: GO | Performed by: OCCUPATIONAL THERAPIST

## 2023-05-21 RX ORDER — SODIUM CHLORIDE 9 MG/ML
INJECTION, SOLUTION INTRAVENOUS CONTINUOUS
Status: ACTIVE | OUTPATIENT
Start: 2023-05-21 | End: 2023-05-22

## 2023-05-21 RX ORDER — NALOXONE HYDROCHLORIDE 0.4 MG/ML
0.4 INJECTION, SOLUTION INTRAMUSCULAR; INTRAVENOUS; SUBCUTANEOUS
Status: DISCONTINUED | OUTPATIENT
Start: 2023-05-21 | End: 2023-05-25 | Stop reason: HOSPADM

## 2023-05-21 RX ORDER — CARBOXYMETHYLCELLULOSE SODIUM 5 MG/ML
1 SOLUTION/ DROPS OPHTHALMIC
Status: DISCONTINUED | OUTPATIENT
Start: 2023-05-21 | End: 2023-05-25 | Stop reason: HOSPADM

## 2023-05-21 RX ORDER — NALOXONE HYDROCHLORIDE 0.4 MG/ML
0.2 INJECTION, SOLUTION INTRAMUSCULAR; INTRAVENOUS; SUBCUTANEOUS
Status: DISCONTINUED | OUTPATIENT
Start: 2023-05-21 | End: 2023-05-25 | Stop reason: HOSPADM

## 2023-05-21 RX ADMIN — SIMETHICONE 80 MG: 80 TABLET, CHEWABLE ORAL at 08:47

## 2023-05-21 RX ADMIN — APIXABAN 5 MG: 5 TABLET, FILM COATED ORAL at 08:47

## 2023-05-21 RX ADMIN — VANCOMYCIN HYDROCHLORIDE 125 MG: 125 CAPSULE ORAL at 12:03

## 2023-05-21 RX ADMIN — VANCOMYCIN HYDROCHLORIDE 125 MG: 125 CAPSULE ORAL at 21:02

## 2023-05-21 RX ADMIN — FOLIC ACID 1 MG: 1 TABLET ORAL at 08:47

## 2023-05-21 RX ADMIN — OXYCODONE HYDROCHLORIDE 15 MG: 5 TABLET ORAL at 01:49

## 2023-05-21 RX ADMIN — PIPERACILLIN AND TAZOBACTAM 3.38 G: 3; .375 INJECTION, POWDER, FOR SOLUTION INTRAVENOUS at 15:56

## 2023-05-21 RX ADMIN — PIPERACILLIN AND TAZOBACTAM 3.38 G: 3; .375 INJECTION, POWDER, FOR SOLUTION INTRAVENOUS at 08:48

## 2023-05-21 RX ADMIN — BACLOFEN 20 MG: 20 TABLET ORAL at 21:03

## 2023-05-21 RX ADMIN — APIXABAN 5 MG: 5 TABLET, FILM COATED ORAL at 21:02

## 2023-05-21 RX ADMIN — VANCOMYCIN HYDROCHLORIDE 125 MG: 125 CAPSULE ORAL at 15:56

## 2023-05-21 RX ADMIN — CLONAZEPAM 1 MG: 1 TABLET ORAL at 08:47

## 2023-05-21 RX ADMIN — PIPERACILLIN AND TAZOBACTAM 3.38 G: 3; .375 INJECTION, POWDER, FOR SOLUTION INTRAVENOUS at 21:08

## 2023-05-21 RX ADMIN — OXYCODONE HYDROCHLORIDE 15 MG: 5 TABLET ORAL at 12:03

## 2023-05-21 RX ADMIN — BACLOFEN 20 MG: 20 TABLET ORAL at 14:01

## 2023-05-21 RX ADMIN — OXYBUTYNIN CHLORIDE 10 MG: 5 TABLET ORAL at 21:02

## 2023-05-21 RX ADMIN — VANCOMYCIN HYDROCHLORIDE 125 MG: 125 CAPSULE ORAL at 08:47

## 2023-05-21 RX ADMIN — SIMETHICONE 80 MG: 80 TABLET, CHEWABLE ORAL at 21:02

## 2023-05-21 RX ADMIN — ACETAMINOPHEN 650 MG: 325 TABLET ORAL at 01:43

## 2023-05-21 RX ADMIN — DULOXETINE HYDROCHLORIDE 60 MG: 60 CAPSULE, DELAYED RELEASE ORAL at 08:48

## 2023-05-21 RX ADMIN — SODIUM CHLORIDE: 9 INJECTION, SOLUTION INTRAVENOUS at 13:13

## 2023-05-21 RX ADMIN — OXYCODONE HYDROCHLORIDE 15 MG: 5 TABLET ORAL at 15:56

## 2023-05-21 RX ADMIN — OXYCODONE HYDROCHLORIDE 20 MG: 5 TABLET ORAL at 21:02

## 2023-05-21 RX ADMIN — CLONAZEPAM 1 MG: 1 TABLET ORAL at 01:49

## 2023-05-21 RX ADMIN — Medication 1 MG: at 21:08

## 2023-05-21 RX ADMIN — OXYBUTYNIN CHLORIDE 10 MG: 5 TABLET ORAL at 08:48

## 2023-05-21 RX ADMIN — SIMETHICONE 80 MG: 80 TABLET, CHEWABLE ORAL at 14:01

## 2023-05-21 RX ADMIN — PREGABALIN 100 MG: 100 CAPSULE ORAL at 21:02

## 2023-05-21 RX ADMIN — OXYCODONE HYDROCHLORIDE 20 MG: 5 TABLET ORAL at 08:47

## 2023-05-21 RX ADMIN — BACLOFEN 20 MG: 20 TABLET ORAL at 08:47

## 2023-05-21 RX ADMIN — CLONAZEPAM 1 MG: 1 TABLET ORAL at 21:02

## 2023-05-21 RX ADMIN — SODIUM CHLORIDE: 9 INJECTION, SOLUTION INTRAVENOUS at 03:11

## 2023-05-21 RX ADMIN — PIPERACILLIN AND TAZOBACTAM 3.38 G: 3; .375 INJECTION, POWDER, FOR SOLUTION INTRAVENOUS at 03:11

## 2023-05-21 RX ADMIN — CLONAZEPAM 1 MG: 1 TABLET ORAL at 14:01

## 2023-05-21 RX ADMIN — FERROUS SULFATE TAB 325 MG (65 MG ELEMENTAL FE) 325 MG: 325 (65 FE) TAB at 08:48

## 2023-05-21 RX ADMIN — PREGABALIN 100 MG: 100 CAPSULE ORAL at 08:48

## 2023-05-21 ASSESSMENT — ACTIVITIES OF DAILY LIVING (ADL)
ADLS_ACUITY_SCORE: 52
ADLS_ACUITY_SCORE: 53
ADLS_ACUITY_SCORE: 49
ADLS_ACUITY_SCORE: 52
ADLS_ACUITY_SCORE: 53
ADLS_ACUITY_SCORE: 52
ADLS_ACUITY_SCORE: 53
ADLS_ACUITY_SCORE: 52
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
## 2023-05-21 NOTE — PROGRESS NOTES
RECEIVING UNIT ED HANDOFF REVIEW    ED Nurse Handoff Report was reviewed by: Jailene Pappas RN on May 20, 2023 at 9:46 PM
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
## 2023-05-21 NOTE — PLAN OF CARE
"Goal Outcome Evaluation:      Plan of Care Reviewed With: patient    VSS on RA. Pt resting in bed between cares, able to turn/reposition self when resting and for cares. C/O pain 9/10 to L hip and groin, prn oxycodone and tylenol given with scheduled klonopin. Arrived from ED via cart. Offered pt specialty air mattress, declined citing \"only going to be here overnight,\" and the soft surface prevents him from using his slide board. Declined prafo boots. Mepilex applied to L hip, sacrum. Wounds to L hip, toes; blanchable sacrum. Primo fit, unable to collect x2 with large outputs, good UO when effective. No BM this shift. PIV with IVF at 125 ml/hr, abx as ordered. Tolerating snacks and PO meds overnight.Bed alarms in use, pt not attempting to exit bed. Alert and oriented, able to make needs known. Continue to monitor.   "

## 2023-05-21 NOTE — PLAN OF CARE
Cognitive Concerns/ Orientation: A&Ox4  BEHAVIOR & AGGRESSION TOOL COLOR: Green  ABNL VS/O2:  VSS on RA  MOBILITY: A2, slide board to WC  PAIN MANAGMENT: Scheduled and PRN oxy  DIET: Regular  BOWEL/BLADDER: Occasional incontinence, Ex cath in place.   ABNL LAB/BG: Hgb- 9.7  DRAIN/DEVICES: L PIV- NS @ 50ml/hr, int. abx  TELEMETRY RHYTHM: N/A  SKIN: Old healing would on L hip, suspected pressure injury on R hip, Scabs on toes.   TESTS/PROCEDURES: N/A  D/C DAY/GOALS/PLACE: Hopeful to discharge to TCU  OTHER IMPORTANT INFO: Pending stool sample to r/o c-diff, but has been unable to provide sample over last 2 days.

## 2023-05-22 ENCOUNTER — APPOINTMENT (OUTPATIENT)
Dept: OCCUPATIONAL THERAPY | Facility: CLINIC | Age: 63
DRG: 690 | End: 2023-05-22
Payer: COMMERCIAL

## 2023-05-22 LAB
ANION GAP SERPL CALCULATED.3IONS-SCNC: 12 MMOL/L (ref 7–15)
BACTERIA UR CULT: ABNORMAL
BUN SERPL-MCNC: 14.3 MG/DL (ref 8–23)
CALCIUM SERPL-MCNC: 9.1 MG/DL (ref 8.8–10.2)
CHLORIDE SERPL-SCNC: 106 MMOL/L (ref 98–107)
CREAT SERPL-MCNC: 0.43 MG/DL (ref 0.67–1.17)
DEPRECATED HCO3 PLAS-SCNC: 23 MMOL/L (ref 22–29)
GFR SERPL CREATININE-BSD FRML MDRD: >90 ML/MIN/1.73M2
GLUCOSE SERPL-MCNC: 115 MG/DL (ref 70–99)
HGB BLD-MCNC: 10 G/DL (ref 13.3–17.7)
POTASSIUM SERPL-SCNC: 4.2 MMOL/L (ref 3.4–5.3)
SODIUM SERPL-SCNC: 141 MMOL/L (ref 136–145)

## 2023-05-22 PROCEDURE — 250N000013 HC RX MED GY IP 250 OP 250 PS 637: Performed by: INTERNAL MEDICINE

## 2023-05-22 PROCEDURE — 99232 SBSQ HOSP IP/OBS MODERATE 35: CPT | Performed by: HOSPITALIST

## 2023-05-22 PROCEDURE — 250N000011 HC RX IP 250 OP 636: Performed by: INTERNAL MEDICINE

## 2023-05-22 PROCEDURE — 80048 BASIC METABOLIC PNL TOTAL CA: CPT | Performed by: HOSPITALIST

## 2023-05-22 PROCEDURE — 97110 THERAPEUTIC EXERCISES: CPT | Mod: GO

## 2023-05-22 PROCEDURE — 97602 WOUND(S) CARE NON-SELECTIVE: CPT

## 2023-05-22 PROCEDURE — 97535 SELF CARE MNGMENT TRAINING: CPT | Mod: GO

## 2023-05-22 PROCEDURE — G0463 HOSPITAL OUTPT CLINIC VISIT: HCPCS

## 2023-05-22 PROCEDURE — 36415 COLL VENOUS BLD VENIPUNCTURE: CPT | Performed by: HOSPITALIST

## 2023-05-22 PROCEDURE — 120N000001 HC R&B MED SURG/OB

## 2023-05-22 PROCEDURE — 85018 HEMOGLOBIN: CPT | Performed by: HOSPITALIST

## 2023-05-22 RX ADMIN — CLONAZEPAM 1 MG: 1 TABLET ORAL at 13:51

## 2023-05-22 RX ADMIN — CLONAZEPAM 1 MG: 1 TABLET ORAL at 08:58

## 2023-05-22 RX ADMIN — VANCOMYCIN HYDROCHLORIDE 125 MG: 125 CAPSULE ORAL at 12:27

## 2023-05-22 RX ADMIN — VANCOMYCIN HYDROCHLORIDE 125 MG: 125 CAPSULE ORAL at 20:34

## 2023-05-22 RX ADMIN — OXYCODONE HYDROCHLORIDE 20 MG: 5 TABLET ORAL at 08:58

## 2023-05-22 RX ADMIN — VANCOMYCIN HYDROCHLORIDE 125 MG: 125 CAPSULE ORAL at 08:59

## 2023-05-22 RX ADMIN — PREGABALIN 100 MG: 100 CAPSULE ORAL at 08:59

## 2023-05-22 RX ADMIN — PREGABALIN 100 MG: 100 CAPSULE ORAL at 20:34

## 2023-05-22 RX ADMIN — SIMETHICONE 80 MG: 80 TABLET, CHEWABLE ORAL at 13:51

## 2023-05-22 RX ADMIN — OXYCODONE HYDROCHLORIDE 20 MG: 5 TABLET ORAL at 22:33

## 2023-05-22 RX ADMIN — Medication 1 MG: at 22:37

## 2023-05-22 RX ADMIN — OXYBUTYNIN CHLORIDE 10 MG: 5 TABLET ORAL at 20:35

## 2023-05-22 RX ADMIN — OXYCODONE HYDROCHLORIDE 15 MG: 5 TABLET ORAL at 18:34

## 2023-05-22 RX ADMIN — PIPERACILLIN AND TAZOBACTAM 3.38 G: 3; .375 INJECTION, POWDER, FOR SOLUTION INTRAVENOUS at 02:48

## 2023-05-22 RX ADMIN — PIPERACILLIN AND TAZOBACTAM 3.38 G: 3; .375 INJECTION, POWDER, FOR SOLUTION INTRAVENOUS at 16:09

## 2023-05-22 RX ADMIN — PIPERACILLIN AND TAZOBACTAM 3.38 G: 3; .375 INJECTION, POWDER, FOR SOLUTION INTRAVENOUS at 08:59

## 2023-05-22 RX ADMIN — SIMETHICONE 80 MG: 80 TABLET, CHEWABLE ORAL at 08:58

## 2023-05-22 RX ADMIN — CLONAZEPAM 1 MG: 1 TABLET ORAL at 02:48

## 2023-05-22 RX ADMIN — FOLIC ACID 1 MG: 1 TABLET ORAL at 08:58

## 2023-05-22 RX ADMIN — PIPERACILLIN AND TAZOBACTAM 3.38 G: 3; .375 INJECTION, POWDER, FOR SOLUTION INTRAVENOUS at 21:04

## 2023-05-22 RX ADMIN — CLONAZEPAM 1 MG: 1 TABLET ORAL at 20:52

## 2023-05-22 RX ADMIN — BACLOFEN 20 MG: 20 TABLET ORAL at 08:59

## 2023-05-22 RX ADMIN — DULOXETINE HYDROCHLORIDE 60 MG: 60 CAPSULE, DELAYED RELEASE ORAL at 08:59

## 2023-05-22 RX ADMIN — OXYBUTYNIN CHLORIDE 10 MG: 5 TABLET ORAL at 08:59

## 2023-05-22 RX ADMIN — OXYCODONE HYDROCHLORIDE 15 MG: 5 TABLET ORAL at 13:51

## 2023-05-22 RX ADMIN — BACLOFEN 20 MG: 20 TABLET ORAL at 13:51

## 2023-05-22 RX ADMIN — APIXABAN 5 MG: 5 TABLET, FILM COATED ORAL at 08:58

## 2023-05-22 RX ADMIN — FERROUS SULFATE TAB 325 MG (65 MG ELEMENTAL FE) 325 MG: 325 (65 FE) TAB at 08:59

## 2023-05-22 RX ADMIN — VANCOMYCIN HYDROCHLORIDE 125 MG: 125 CAPSULE ORAL at 16:09

## 2023-05-22 RX ADMIN — SIMETHICONE 80 MG: 80 TABLET, CHEWABLE ORAL at 20:33

## 2023-05-22 RX ADMIN — BACLOFEN 20 MG: 20 TABLET ORAL at 20:34

## 2023-05-22 RX ADMIN — APIXABAN 5 MG: 5 TABLET, FILM COATED ORAL at 20:35

## 2023-05-22 ASSESSMENT — ACTIVITIES OF DAILY LIVING (ADL)
ADLS_ACUITY_SCORE: 49
WALKING_OR_CLIMBING_STAIRS_DIFFICULTY: YES
CONCENTRATING,_REMEMBERING_OR_MAKING_DECISIONS_DIFFICULTY: NO
DRESSING/BATHING_DIFFICULTY: YES
ADLS_ACUITY_SCORE: 49
BATHING: 1-->ASSISTANCE NEEDED
DOING_ERRANDS_INDEPENDENTLY_DIFFICULTY: NO
TRANSFERRING: 1-->ASSISTANCE (EQUIPMENT/PERSON) NEEDED
ADLS_ACUITY_SCORE: 49
FALL_HISTORY_WITHIN_LAST_SIX_MONTHS: YES
ADLS_ACUITY_SCORE: 47
ADLS_ACUITY_SCORE: 49
ADLS_ACUITY_SCORE: 47
ADLS_ACUITY_SCORE: 49
DIFFICULTY_COMMUNICATING: NO
ADLS_ACUITY_SCORE: 47
ADLS_ACUITY_SCORE: 49
DRESS: 1-->ASSISTANCE (EQUIPMENT/PERSON) NEEDED (NOT DEVELOPMENTALLY APPROPRIATE)
WALKING_OR_CLIMBING_STAIRS: TRANSFERRING DIFFICULTY, DEPENDENT;AMBULATION DIFFICULTY, DEPENDENT;STAIR CLIMBING DIFFICULTY, DEPENDENT
TOILETING_ISSUES: YES
WEAR_GLASSES_OR_BLIND: NO
TRANSFERRING: 1-->ASSISTANCE (EQUIPMENT/PERSON) NEEDED (NOT DEVELOPMENTALLY APPROPRIATE)
CHANGE_IN_FUNCTIONAL_STATUS_SINCE_ONSET_OF_CURRENT_ILLNESS/INJURY: NO
DRESSING/BATHING: BATHING DIFFICULTY, ASSISTANCE 1 PERSON;DRESSING DIFFICULTY, ASSISTANCE 1 PERSON
ADLS_ACUITY_SCORE: 49
ADLS_ACUITY_SCORE: 49
ADLS_ACUITY_SCORE: 47
TOILETING: 1-->ASSISTANCE (EQUIPMENT/PERSON) NEEDED
NUMBER_OF_TIMES_PATIENT_HAS_FALLEN_WITHIN_LAST_SIX_MONTHS: 2
TOILETING: 1-->ASSISTANCE (EQUIPMENT/PERSON) NEEDED (NOT DEVELOPMENTALLY APPROPRIATE)
TOILETING_ASSISTANCE: TOILETING DIFFICULTY, ASSISTANCE 1 PERSON;TOILETING DIFFICULTY, REQUIRES EQUIPMENT
DRESS: 1-->ASSISTANCE (EQUIPMENT/PERSON) NEEDED
DIFFICULTY_EATING/SWALLOWING: NO
HEARING_DIFFICULTY_OR_DEAF: NO

## 2023-05-22 NOTE — PLAN OF CARE
Summary:  Present to ED d/t inability to properly care for himself at home and frequent diarrhea.   DATE & TIME: 05/22/23 6919-0085  Cognitive Concerns/ Orientation: A&Ox4  BEHAVIOR & AGGRESSION TOOL COLOR: Green  ABNL VS/O2:  VSS on RA  MOBILITY: Ax2, slide board to WC, able to turn self in bed.   PAIN MANAGMENT: Scheduled and PRN oxy x2  DIET: Regular  BOWEL/BLADDER: Occasional incontinence, straight cath x2.  ABNL LAB/BG: None  DRAIN/DEVICES: L PIV SL with int abx  TELEMETRY RHYTHM: N/A  SKIN: Old healing would on L hip, suspected pressure injury on R hip, WOC consult ordered. Scabs on toes.   TESTS/PROCEDURES: N/A  D/C DAY/GOALS/PLACE: Hopeful to discharge to TCU  OTHER IMPORTANT INFO: Unable to provide stool sample to r/o c.diff. Maintain enteric and contact precautions
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
## 2023-05-22 NOTE — PLAN OF CARE
Cognitive Concerns/ Orientation: A&Ox4  BEHAVIOR & AGGRESSION TOOL COLOR: Green  ABNL VS/O2:  VSS on RA  MOBILITY: A2, not OOB  PAIN MANAGMENT: Scheduled oxy  DIET: Regular  BOWEL/BLADDER:Straight Cath with 1350 mL out  ABNL LAB/BG: Hgb- 9.7  DRAIN/DEVICES: Ns @ 50 mL/hr   TELEMETRY RHYTHM: N/A  SKIN: R hip pressure injury, blanchable redness coccyx   TESTS/PROCEDURES: N/A  D/C DAY/GOALS/PLACE: Awaiting TCU placement  OTHER IMPORTANT INFO: Stool still pending, did not have BM this shift. Had fowl smelling cloudy urine, no any other sign, may need straight cath more frequent d/t his condition
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
## 2023-05-23 ENCOUNTER — APPOINTMENT (OUTPATIENT)
Dept: OCCUPATIONAL THERAPY | Facility: CLINIC | Age: 63
DRG: 690 | End: 2023-05-23
Payer: COMMERCIAL

## 2023-05-23 PROCEDURE — 250N000013 HC RX MED GY IP 250 OP 250 PS 637: Performed by: INTERNAL MEDICINE

## 2023-05-23 PROCEDURE — 97535 SELF CARE MNGMENT TRAINING: CPT | Mod: GO | Performed by: OCCUPATIONAL THERAPIST

## 2023-05-23 PROCEDURE — 120N000001 HC R&B MED SURG/OB

## 2023-05-23 PROCEDURE — 250N000011 HC RX IP 250 OP 636: Performed by: INTERNAL MEDICINE

## 2023-05-23 PROCEDURE — 97530 THERAPEUTIC ACTIVITIES: CPT | Mod: GO | Performed by: OCCUPATIONAL THERAPIST

## 2023-05-23 PROCEDURE — 250N000013 HC RX MED GY IP 250 OP 250 PS 637: Performed by: HOSPITALIST

## 2023-05-23 PROCEDURE — 99232 SBSQ HOSP IP/OBS MODERATE 35: CPT | Performed by: HOSPITALIST

## 2023-05-23 RX ORDER — AMOXICILLIN 250 MG
1 CAPSULE ORAL 2 TIMES DAILY
Status: DISCONTINUED | OUTPATIENT
Start: 2023-05-23 | End: 2023-05-25 | Stop reason: HOSPADM

## 2023-05-23 RX ORDER — NITROFURANTOIN 25; 75 MG/1; MG/1
100 CAPSULE ORAL EVERY 12 HOURS SCHEDULED
Status: DISCONTINUED | OUTPATIENT
Start: 2023-05-23 | End: 2023-05-25 | Stop reason: HOSPADM

## 2023-05-23 RX ORDER — POLYETHYLENE GLYCOL 3350 17 G/17G
17 POWDER, FOR SOLUTION ORAL 2 TIMES DAILY PRN
Status: DISCONTINUED | OUTPATIENT
Start: 2023-05-23 | End: 2023-05-25 | Stop reason: HOSPADM

## 2023-05-23 RX ADMIN — SENNOSIDES AND DOCUSATE SODIUM 1 TABLET: 50; 8.6 TABLET ORAL at 11:11

## 2023-05-23 RX ADMIN — OXYCODONE HYDROCHLORIDE 20 MG: 5 TABLET ORAL at 20:43

## 2023-05-23 RX ADMIN — SIMETHICONE 80 MG: 80 TABLET, CHEWABLE ORAL at 13:19

## 2023-05-23 RX ADMIN — PIPERACILLIN AND TAZOBACTAM 3.38 G: 3; .375 INJECTION, POWDER, FOR SOLUTION INTRAVENOUS at 09:27

## 2023-05-23 RX ADMIN — OXYBUTYNIN CHLORIDE 10 MG: 5 TABLET ORAL at 08:24

## 2023-05-23 RX ADMIN — CLONAZEPAM 1 MG: 1 TABLET ORAL at 08:24

## 2023-05-23 RX ADMIN — APIXABAN 5 MG: 5 TABLET, FILM COATED ORAL at 20:42

## 2023-05-23 RX ADMIN — CLONAZEPAM 1 MG: 1 TABLET ORAL at 02:43

## 2023-05-23 RX ADMIN — OXYCODONE HYDROCHLORIDE 20 MG: 5 TABLET ORAL at 08:24

## 2023-05-23 RX ADMIN — SIMETHICONE 80 MG: 80 TABLET, CHEWABLE ORAL at 20:42

## 2023-05-23 RX ADMIN — SIMETHICONE 80 MG: 80 TABLET, CHEWABLE ORAL at 08:23

## 2023-05-23 RX ADMIN — BACLOFEN 20 MG: 20 TABLET ORAL at 13:19

## 2023-05-23 RX ADMIN — CLONAZEPAM 1 MG: 1 TABLET ORAL at 20:42

## 2023-05-23 RX ADMIN — PREGABALIN 100 MG: 100 CAPSULE ORAL at 08:23

## 2023-05-23 RX ADMIN — OXYCODONE HYDROCHLORIDE 15 MG: 5 TABLET ORAL at 02:58

## 2023-05-23 RX ADMIN — DULOXETINE HYDROCHLORIDE 60 MG: 60 CAPSULE, DELAYED RELEASE ORAL at 08:24

## 2023-05-23 RX ADMIN — BACLOFEN 20 MG: 20 TABLET ORAL at 08:24

## 2023-05-23 RX ADMIN — APIXABAN 5 MG: 5 TABLET, FILM COATED ORAL at 08:24

## 2023-05-23 RX ADMIN — BACLOFEN 20 MG: 20 TABLET ORAL at 20:43

## 2023-05-23 RX ADMIN — PREGABALIN 100 MG: 100 CAPSULE ORAL at 20:42

## 2023-05-23 RX ADMIN — SENNOSIDES AND DOCUSATE SODIUM 1 TABLET: 50; 8.6 TABLET ORAL at 20:42

## 2023-05-23 RX ADMIN — NITROFURANTOIN MONOHYDRATE/MACROCRYSTALLINE 100 MG: 25; 75 CAPSULE ORAL at 20:42

## 2023-05-23 RX ADMIN — VANCOMYCIN HYDROCHLORIDE 125 MG: 125 CAPSULE ORAL at 08:23

## 2023-05-23 RX ADMIN — FOLIC ACID 1 MG: 1 TABLET ORAL at 08:23

## 2023-05-23 RX ADMIN — OXYCODONE HYDROCHLORIDE 15 MG: 5 TABLET ORAL at 13:19

## 2023-05-23 RX ADMIN — PIPERACILLIN AND TAZOBACTAM 3.38 G: 3; .375 INJECTION, POWDER, FOR SOLUTION INTRAVENOUS at 02:43

## 2023-05-23 RX ADMIN — NITROFURANTOIN MONOHYDRATE/MACROCRYSTALLINE 100 MG: 25; 75 CAPSULE ORAL at 13:19

## 2023-05-23 RX ADMIN — CLONAZEPAM 1 MG: 1 TABLET ORAL at 13:19

## 2023-05-23 RX ADMIN — FERROUS SULFATE TAB 325 MG (65 MG ELEMENTAL FE) 325 MG: 325 (65 FE) TAB at 08:24

## 2023-05-23 RX ADMIN — OXYBUTYNIN CHLORIDE 10 MG: 5 TABLET ORAL at 20:41

## 2023-05-23 ASSESSMENT — ACTIVITIES OF DAILY LIVING (ADL)
ADLS_ACUITY_SCORE: 51
ADLS_ACUITY_SCORE: 47
ADLS_ACUITY_SCORE: 51
ADLS_ACUITY_SCORE: 47
ADLS_ACUITY_SCORE: 51
ADLS_ACUITY_SCORE: 51
ADLS_ACUITY_SCORE: 47

## 2023-05-23 NOTE — CONSULTS
Care Management Initial Consult    General Information  Assessment completed with: patient and chart review        Primary Care Provider verified and updated as needed:     Readmission within the last 30 days:           Advance Care Planning:            Communication Assessment  Patient's communication style: spoken language (English or Bilingual)    Hearing Difficulty or Deaf: no   Wear Glasses or Blind: no    Cognitive  Cognitive/Neuro/Behavioral: WDL                      Living Environment:   People in home:    self   Current living Arrangements:        Able to return to prior arrangements:  After TCU       Family/Social Support:  Care provided by:    Provides care for:                  Description of Support System:           Current Resources:   Patient receiving home care services:       Community Resources:    Equipment currently used at home: raised toilet seat, wheelchair, manual (transfer tub bench, has tub/shower chair, slide board)  Supplies currently used at home:      Employment/Financial:  Employment Status:          Financial Concerns:             Does the patient's insurance plan have a 3 day qualifying hospital stay waiver?  Yes   Will the waiver be used for post-acute placement? Yes    Lifestyle & Psychosocial Needs:  Social Determinants of Health     Tobacco Use: Low Risk  (8/12/2020)    Patient History      Smoking Tobacco Use: Never      Smokeless Tobacco Use: Never      Passive Exposure: Not on file   Alcohol Use: Not on file   Financial Resource Strain: Not on file   Food Insecurity: Not on file   Transportation Needs: Not on file   Physical Activity: Not on file   Stress: Not on file   Social Connections: Not on file   Intimate Partner Violence: Not on file   Depression: Not on file   Housing Stability: Not on file       Functional Status:  Prior to admission patient needed assistance: wound care             Mental Health Status:no current concerns          Chemical Dependency Status:  H&P  "indicated drug seeking for pain medication. Bedside RN reports patient's request for pain medication has been reasonable                 Values/Beliefs:  Spiritual, Cultural Beliefs, [employer] Practices, Values that affect care:                 Additional Information:  This history taken from patient's current MD note:  recently hospitalized from 3/24/2023 to 4/5/2023 due to acute parotitis with MSSA bacteremia (treated with 4 weeks of IV cefazolin), recurrent C. difficile (on prolonged vancomycin taper - 125 mg QID through 4/13, BID 4/14-4/21, daily 4/22-4/28, Q72H 4/29-5/26), acute DVT of left lower extremity (on Eliquis). He was then admitted at [facility] from 5/13/2023 to 5/17/2023 due to left buttock pressure injury, failure to thrive and UTI (treated with 7 days of ciprofloxacin).  He discharged back home with home care.    Patient presents to the ED explaining he is weaker then he thought and is now requesting TCU or in need of a hospital bed at home.  Patient was seen by OT which does recommend patient admit to TCU for strengthening so he can return home and function independently as he had prior to these recent infections.     Writer met with patient to introduce role in TCU placement.  Patient acknowledges he is interested in TCU in a facility which will have equipment to use for strengthing such as a \"Universal machine\"  At baseline he is self sufficient, uses a custom w/c for mobility.  Only home care support he has is a daily nurse for wound care.      He gave permission for writer to make referral to [place] and other TCU's in the area.  Referrals made to [facility] and [facility].   Alfreda Moore, [facility]      "

## 2023-05-23 NOTE — PLAN OF CARE
Goal Outcome Evaluation:       Summary:  Present to ED d/t inability to properly care for himself at home (failure to thrive) and diarrhea. UTI.  DATE & TIME: 05/22/23 2810-4519  Cognitive Concerns/ Orientation: A&Ox4  BEHAVIOR & AGGRESSION TOOL COLOR: Green  ABNL VS/O2:  VSS on RA  MOBILITY: Ax1-2, slide board to WC, able to turn self in bed.   PAIN MANAGMENT: Scheduled oxy x1 and PRN oxy x2 - effective.  DIET: Regular  BOWEL/BLADDER: Occasional incontinence, bladder scan >660 - straight cath x1 - output 700ml.  ABNL LAB/BG: None  DRAIN/DEVICES: L PIV SL with int abx  TELEMETRY RHYTHM: N/A  SKIN: Old healing would on L hip, suspected pressure injury on R hip, WOC consult ordered. Scabs on toes ADRIANA. See wound care orders.  TESTS/PROCEDURES: N/A  D/C DAY/GOALS/PLACE: Hopeful to discharge to TCU  OTHER IMPORTANT INFO: Unable to provide stool sample to r/o c.diff. Maintain enteric and contact precautions.
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
## 2023-05-23 NOTE — PLAN OF CARE
Summary:  Present to ED d/t inability to properly care for himself at home (failure to thrive) and diarrhea. UTI.  DATE & TIME: 05/23/23 4855-8733  Cognitive Concerns/ Orientation: A&Ox4  BEHAVIOR & AGGRESSION TOOL COLOR: Green  ABNL VS/O2:  VSS on RA  MOBILITY: Ax1-2, slide board to WC, able to turn self in bed. Up in WC today, went outside  PAIN MANAGMENT: On scheduled oxy, PRN oxy x1 for wound pain  DIET: Regular  BOWEL/BLADDER: Neurogenic bladder and bowel. Bladder scanned for >650, straight cath x2. No BM, started on bowel regimen today.   ABNL LAB/BG: None  DRAIN/DEVICES: R PIV x2 SL  TELEMETRY RHYTHM: N/A  SKIN: Old healing would on L hip, suspected pressure injury on R hip, WOC following. Scabs on toes ADRIANA. See wound care orders.  TESTS/PROCEDURES: N/A  D/C DAY/GOALS/PLACE: Hopeful to discharge to TCU  OTHER IMPORTANT INFO: Unable to provide stool sample to r/o c.diff. Maintain enteric and contact precautions. Started bowel regimen today, PO vanco and IV zosyn discontinued and changed to PO ABX. SW following.

## 2023-05-24 ENCOUNTER — APPOINTMENT (OUTPATIENT)
Dept: OCCUPATIONAL THERAPY | Facility: CLINIC | Age: 63
DRG: 690 | End: 2023-05-24
Payer: COMMERCIAL

## 2023-05-24 PROCEDURE — 120N000001 HC R&B MED SURG/OB

## 2023-05-24 PROCEDURE — 250N000013 HC RX MED GY IP 250 OP 250 PS 637: Performed by: HOSPITALIST

## 2023-05-24 PROCEDURE — 250N000013 HC RX MED GY IP 250 OP 250 PS 637: Performed by: INTERNAL MEDICINE

## 2023-05-24 PROCEDURE — 99232 SBSQ HOSP IP/OBS MODERATE 35: CPT | Performed by: INTERNAL MEDICINE

## 2023-05-24 PROCEDURE — 87506 IADNA-DNA/RNA PROBE TQ 6-11: CPT | Performed by: EMERGENCY MEDICINE

## 2023-05-24 RX ADMIN — APIXABAN 5 MG: 5 TABLET, FILM COATED ORAL at 20:44

## 2023-05-24 RX ADMIN — PREGABALIN 100 MG: 100 CAPSULE ORAL at 09:16

## 2023-05-24 RX ADMIN — APIXABAN 5 MG: 5 TABLET, FILM COATED ORAL at 09:17

## 2023-05-24 RX ADMIN — BACLOFEN 20 MG: 20 TABLET ORAL at 13:16

## 2023-05-24 RX ADMIN — CLONAZEPAM 1 MG: 1 TABLET ORAL at 09:16

## 2023-05-24 RX ADMIN — CLONAZEPAM 1 MG: 1 TABLET ORAL at 02:05

## 2023-05-24 RX ADMIN — FERROUS SULFATE TAB 325 MG (65 MG ELEMENTAL FE) 325 MG: 325 (65 FE) TAB at 09:17

## 2023-05-24 RX ADMIN — OXYCODONE HYDROCHLORIDE 20 MG: 5 TABLET ORAL at 07:45

## 2023-05-24 RX ADMIN — CLONAZEPAM 1 MG: 1 TABLET ORAL at 13:16

## 2023-05-24 RX ADMIN — OXYCODONE HYDROCHLORIDE 15 MG: 5 TABLET ORAL at 18:23

## 2023-05-24 RX ADMIN — BACLOFEN 20 MG: 20 TABLET ORAL at 20:44

## 2023-05-24 RX ADMIN — SIMETHICONE 80 MG: 80 TABLET, CHEWABLE ORAL at 09:16

## 2023-05-24 RX ADMIN — SENNOSIDES AND DOCUSATE SODIUM 1 TABLET: 50; 8.6 TABLET ORAL at 09:17

## 2023-05-24 RX ADMIN — NITROFURANTOIN MONOHYDRATE/MACROCRYSTALLINE 100 MG: 25; 75 CAPSULE ORAL at 20:44

## 2023-05-24 RX ADMIN — FOLIC ACID 1 MG: 1 TABLET ORAL at 09:16

## 2023-05-24 RX ADMIN — CLONAZEPAM 1 MG: 1 TABLET ORAL at 20:44

## 2023-05-24 RX ADMIN — NITROFURANTOIN MONOHYDRATE/MACROCRYSTALLINE 100 MG: 25; 75 CAPSULE ORAL at 09:16

## 2023-05-24 RX ADMIN — OXYCODONE HYDROCHLORIDE 15 MG: 5 TABLET ORAL at 02:05

## 2023-05-24 RX ADMIN — SENNOSIDES AND DOCUSATE SODIUM 1 TABLET: 50; 8.6 TABLET ORAL at 20:44

## 2023-05-24 RX ADMIN — SIMETHICONE 80 MG: 80 TABLET, CHEWABLE ORAL at 13:16

## 2023-05-24 RX ADMIN — OXYBUTYNIN CHLORIDE 10 MG: 5 TABLET ORAL at 20:43

## 2023-05-24 RX ADMIN — OXYCODONE HYDROCHLORIDE 15 MG: 5 TABLET ORAL at 13:15

## 2023-05-24 RX ADMIN — OXYCODONE HYDROCHLORIDE 20 MG: 5 TABLET ORAL at 20:43

## 2023-05-24 RX ADMIN — OXYBUTYNIN CHLORIDE 10 MG: 5 TABLET ORAL at 09:16

## 2023-05-24 RX ADMIN — SIMETHICONE 80 MG: 80 TABLET, CHEWABLE ORAL at 20:43

## 2023-05-24 RX ADMIN — DULOXETINE HYDROCHLORIDE 60 MG: 60 CAPSULE, DELAYED RELEASE ORAL at 09:17

## 2023-05-24 RX ADMIN — BACLOFEN 20 MG: 20 TABLET ORAL at 09:16

## 2023-05-24 RX ADMIN — PREGABALIN 100 MG: 100 CAPSULE ORAL at 20:43

## 2023-05-24 ASSESSMENT — ACTIVITIES OF DAILY LIVING (ADL)
ADLS_ACUITY_SCORE: 51
ADLS_ACUITY_SCORE: 45
ADLS_ACUITY_SCORE: 43
ADLS_ACUITY_SCORE: 47
ADLS_ACUITY_SCORE: 45
ADLS_ACUITY_SCORE: 45
ADLS_ACUITY_SCORE: 47
ADLS_ACUITY_SCORE: 45
ADLS_ACUITY_SCORE: 43
ADLS_ACUITY_SCORE: 45
ADLS_ACUITY_SCORE: 45
ADLS_ACUITY_SCORE: 47

## 2023-05-24 NOTE — PLAN OF CARE
Summary:  Present to ED d/t inability to properly care for himself at home (failure to thrive) and diarrhea. UTI.  DATE & TIME: 05/23/23, 1900 - 0730  Cognitive Concerns/ Orientation: A&Ox4  BEHAVIOR & AGGRESSION TOOL COLOR: Green  ABNL VS/O2:  VSS on RA  MOBILITY: Ax1-2, slide board to WC, able to turn self in bed.   PAIN MANAGMENT: On scheduled oxy, PRN oxycodone x 1 dose for wound pain  DIET: Regular  BOWEL/BLADDER: Neurogenic bladder and bowel. Straight cath x 2, had BM x 2, started on bowel regimen yesterday   ABNL LAB/BG: None  DRAIN/DEVICES: PIV SL  TELEMETRY RHYTHM: N/A  SKIN: Old healing would on L hip, suspected pressure injury on R hip, WOC following. Scabs on toes ADRIANA. See wound care orders.  TESTS/PROCEDURES: Stool sample sent to lab for enteric bacteria and virus. Results pending  D/C DAY/GOALS/PLACE: Plans to discharge to TCU  OTHER IMPORTANT INFO: Enteric and contact precautions maintained      Goal Outcome Evaluation:      Plan of Care Reviewed With: patient    Overall Patient Progress: improvingOverall Patient Progress: improving
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
## 2023-05-24 NOTE — PROGRESS NOTES
Care Management Follow Up    Length of Stay (days): 4    Expected Discharge Date: 05/25/2023     Concerns to be Addressed:   discharge planning    Patient plan of care discussed at interdisciplinary rounds: Yes    Anticipated Discharge Disposition: Transitional Care     Anticipated Discharge Services:    Anticipated Discharge DME:      Patient/family educated on Medicare website which has current facility and service quality ratings:    Education Provided on the Discharge Plan:  yes  Patient/Family in Agreement with the Plan:  yes    Referrals Placed by CM/SW: Post Acute Facilities  Private pay costs discussed: Not applicable    Additional Information:  No accepting TCU yet. Additional referrals sent. Pt updated. He has no preference on location. His goal remains TCU for strengthening. He is motivated to return home to his dog. He will need a bed and a PCP. SW following.       ARASH Al, [facility]  [phone] Desk phone  [phone] Cell/text (Preferred)  [facility]

## 2023-05-24 NOTE — PROGRESS NOTES
Summary:  Present to ED d/t inability to properly care for himself at home (failure to thrive) and diarrhea. UTI.  DATE & TIME: 05/24/23, [id number]  Cognitive Concerns/ Orientation: A&Ox4  BEHAVIOR & AGGRESSION TOOL COLOR: Green  ABNL VS/O2:  VSS on RA  MOBILITY: Ax1-2, slide board to WC, able to turn self in bed.   PAIN MANAGMENT: On scheduled oxy, PRN oxycodone x 1   DIET: Regular  BOWEL/BLADDER: Neurogenic bladder and bowel. Straight cath x 1, had BM x 2, started on bowel regimen yesterday   ABNL LAB/BG: None  DRAIN/DEVICES: PIV SL  TELEMETRY RHYTHM: N/A  SKIN: Old healing would on L hip, suspected pressure injury on R hip, WOC following. Scabs on toes ADRIANA. See wound care orders.  TESTS/PROCEDURES: Stool sample sent to lab for enteric bacteria and virus. Results pending  D/C DAY/GOALS/PLACE: Plans to discharge to TCU  OTHER IMPORTANT INFO: Enteric and contact precautions maintained

## 2023-05-25 VITALS
WEIGHT: 160 LBS | BODY MASS INDEX: 22.9 KG/M2 | RESPIRATION RATE: 16 BRPM | TEMPERATURE: 98.5 F | HEART RATE: 60 BPM | DIASTOLIC BLOOD PRESSURE: 59 MMHG | OXYGEN SATURATION: 96 % | SYSTOLIC BLOOD PRESSURE: 121 MMHG | HEIGHT: 70 IN

## 2023-05-25 PROCEDURE — 250N000013 HC RX MED GY IP 250 OP 250 PS 637: Performed by: HOSPITALIST

## 2023-05-25 PROCEDURE — 99239 HOSP IP/OBS DSCHRG MGMT >30: CPT | Performed by: INTERNAL MEDICINE

## 2023-05-25 PROCEDURE — 250N000013 HC RX MED GY IP 250 OP 250 PS 637: Performed by: INTERNAL MEDICINE

## 2023-05-25 RX ORDER — OXYCODONE HYDROCHLORIDE 15 MG/1
15 TABLET ORAL EVERY 4 HOURS PRN
Qty: 30 TABLET | Refills: 0 | Status: ON HOLD | OUTPATIENT
Start: 2023-05-25 | End: 2023-09-11

## 2023-05-25 RX ORDER — POLYETHYLENE GLYCOL 3350 17 G/17G
17 POWDER, FOR SOLUTION ORAL DAILY PRN
Qty: 510 G | Refills: 0 | DISCHARGE
Start: 2023-05-25

## 2023-05-25 RX ORDER — PREGABALIN 100 MG/1
100 CAPSULE ORAL 2 TIMES DAILY
Qty: 60 CAPSULE | Refills: 0 | Status: ON HOLD | OUTPATIENT
Start: 2023-05-25 | End: 2024-04-08

## 2023-05-25 RX ORDER — DULOXETIN HYDROCHLORIDE 60 MG/1
60 CAPSULE, DELAYED RELEASE ORAL DAILY
Qty: 30 CAPSULE | Refills: 0 | Status: ON HOLD | OUTPATIENT
Start: 2023-05-25 | End: 2024-04-08

## 2023-05-25 RX ORDER — CLONAZEPAM 1 MG/1
1 TABLET ORAL EVERY 6 HOURS
Qty: 30 TABLET | Refills: 0 | Status: ON HOLD | OUTPATIENT
Start: 2023-05-25 | End: 2023-09-11

## 2023-05-25 RX ORDER — OXYCODONE HYDROCHLORIDE 20 MG/1
20 TABLET ORAL 2 TIMES DAILY
Qty: 30 TABLET | Refills: 0 | Status: ON HOLD | OUTPATIENT
Start: 2023-05-25 | End: 2023-09-11

## 2023-05-25 RX ORDER — NITROFURANTOIN 25; 75 MG/1; MG/1
100 CAPSULE ORAL EVERY 12 HOURS
Qty: 10 CAPSULE | Refills: 0 | DISCHARGE
Start: 2023-05-25 | End: 2023-05-30

## 2023-05-25 RX ORDER — AMOXICILLIN 250 MG
1 CAPSULE ORAL 2 TIMES DAILY
DISCHARGE
Start: 2023-05-25

## 2023-05-25 RX ADMIN — SENNOSIDES AND DOCUSATE SODIUM 1 TABLET: 50; 8.6 TABLET ORAL at 08:26

## 2023-05-25 RX ADMIN — OXYCODONE HYDROCHLORIDE 15 MG: 5 TABLET ORAL at 03:29

## 2023-05-25 RX ADMIN — CLONAZEPAM 1 MG: 1 TABLET ORAL at 14:49

## 2023-05-25 RX ADMIN — FOLIC ACID 1 MG: 1 TABLET ORAL at 08:27

## 2023-05-25 RX ADMIN — OXYBUTYNIN CHLORIDE 10 MG: 5 TABLET ORAL at 08:25

## 2023-05-25 RX ADMIN — SIMETHICONE 80 MG: 80 TABLET, CHEWABLE ORAL at 14:49

## 2023-05-25 RX ADMIN — CLONAZEPAM 1 MG: 1 TABLET ORAL at 02:49

## 2023-05-25 RX ADMIN — OXYCODONE HYDROCHLORIDE 15 MG: 5 TABLET ORAL at 12:57

## 2023-05-25 RX ADMIN — DULOXETINE HYDROCHLORIDE 60 MG: 60 CAPSULE, DELAYED RELEASE ORAL at 08:26

## 2023-05-25 RX ADMIN — OXYCODONE HYDROCHLORIDE 20 MG: 5 TABLET ORAL at 08:25

## 2023-05-25 RX ADMIN — NITROFURANTOIN MONOHYDRATE/MACROCRYSTALLINE 100 MG: 25; 75 CAPSULE ORAL at 08:26

## 2023-05-25 RX ADMIN — SIMETHICONE 80 MG: 80 TABLET, CHEWABLE ORAL at 08:25

## 2023-05-25 RX ADMIN — PREGABALIN 100 MG: 100 CAPSULE ORAL at 08:27

## 2023-05-25 RX ADMIN — BACLOFEN 20 MG: 20 TABLET ORAL at 14:49

## 2023-05-25 RX ADMIN — APIXABAN 5 MG: 5 TABLET, FILM COATED ORAL at 08:27

## 2023-05-25 RX ADMIN — BACLOFEN 20 MG: 20 TABLET ORAL at 08:26

## 2023-05-25 RX ADMIN — FERROUS SULFATE TAB 325 MG (65 MG ELEMENTAL FE) 325 MG: 325 (65 FE) TAB at 08:27

## 2023-05-25 RX ADMIN — CLONAZEPAM 1 MG: 1 TABLET ORAL at 08:27

## 2023-05-25 ASSESSMENT — ACTIVITIES OF DAILY LIVING (ADL)
ADLS_ACUITY_SCORE: 48
ADLS_ACUITY_SCORE: 53
ADLS_ACUITY_SCORE: 48
ADLS_ACUITY_SCORE: 53
ADLS_ACUITY_SCORE: 53
ADLS_ACUITY_SCORE: 45
ADLS_ACUITY_SCORE: 53
ADLS_ACUITY_SCORE: 48

## 2023-05-25 NOTE — PLAN OF CARE
Summary:  Present to ED d/t inability to properly care for himself at home (failure to thrive) and diarrhea. UTI.  DATE & TIME: 05/24/23, [id number]  Cognitive Concerns/ Orientation: A&Ox4  BEHAVIOR & AGGRESSION TOOL COLOR: Green  ABNL VS/O2:  VSS on RA  MOBILITY: Ax1-2, slide board to WC, able to turn self in bed.   PAIN MANAGMENT: On scheduled oxy, PRN oxycodone x 2   DIET: Regular  BOWEL/BLADDER: Neurogenic bladder and bowel. Straight cath x 1, had BM x 2, started on bowel regimen yesterday   ABNL LAB/BG: None  DRAIN/DEVICES: PIV SL  TELEMETRY RHYTHM: N/A  SKIN: Old healing would on L hip, suspected pressure injury on R hip, WOC following. Scabs on toes ADRIANA. See wound care orders.  TESTS/PROCEDURES: Stool test for enteric bacteria negative   D/C DAY/GOALS/PLACE: Plans to discharge to TCU  OTHER IMPORTANT INFO: Contact precautions maintained

## 2023-05-25 NOTE — PROGRESS NOTES
Care Management Discharge Note    Discharge Date: 05/25/2023       Discharge Disposition:  ([facility])    Discharge Services:      Discharge DME:      Discharge Transportation:  ([employer] service window 1440 and 1520)    Private pay costs discussed: transportation costs    Does the patient's insurance plan have a 3 day qualifying hospital stay waiver?  Yes   Will the waiver be used for post-acute placement? No    PAS Confirmation Code: [id number]  Patient/family educated on Medicare website which has current facility and service quality ratings:      Education Provided on the Discharge Plan:  yes  Persons Notified of Discharge Plans: patent  Patient/Family in Agreement with the Plan:  yes    Handoff Referral Completed: No    Additional Information:  Discharge occurring as planned. Arrangements made with patient earlier today.   Orders and script have been sent to [facility].  Patient aware he will be placed into a double room but will not have a roommate.  [facility] admissions is aware patent's goal is therapy thru use of exercise equipment to build his upper trunk and extremity strength.  PA approved.  Effective date: 5/25/23  PA reference #: [id number]  Pt. notified:   Yes   Pt. informed directly.        KEVIN Craft
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
## 2023-05-25 NOTE — DISCHARGE INSTRUCTIONS
"Rt and Lt IT PI: change dressing on even days and prn   1. Cleanse with MicroKlenz and gauze. Use \"stream\" setting on wound cleanser to aid in removal of devitalized tissue from wound bed. Pat dry.   2. Swab rafael-wound skin with Cavilon no-sting barrier. Allow to dry.   3. Apply Mepilex border   4. Date and time dressing     Right 2nd toe:     1. Cleanse area with MicroKlenz. Pat dry.   2. Cover with Polymem 2x4 (#[id number]). Can cut into thin strips to improve fit. Initial and date.  "

## 2023-05-25 NOTE — PLAN OF CARE
Goal Outcome Evaluation:       Summary:  Present to ED d/t inability to properly care for himself at home (failure to thrive) and diarrhea. UTI. - contact precautions.    DATE & TIME: 05/24/23 - 5/25/23, 6456-3422    Cognitive Concerns/ Orientation: A&Ox4  BEHAVIOR & AGGRESSION TOOL COLOR: Green  ABNL VS/O2:  VSS RA  MOBILITY: Ax1-2, slide board to WC, able to turn self in bed w/ 1 assist.   PAIN MANAGMENT: On scheduled oxy. Gave PRN oxy x1 - effective.  DIET: Regular  BOWEL/BLADDER: Neurogenic bladder and bowel. 1 soft BM. Bladder scan >990, straight cath 850 ml.  ABNL LAB/BG: creat. 0.45  DRAIN/DEVICES: R PIV SL  TELEMETRY RHYTHM: N/A  SKIN: Old healing would on L hip, suspected pressure injury on R hip, WOC following. Scabs on toes ADRIANA. See wound care orders.  TESTS/PROCEDURES: Stool test for enteric bacteria negative.   D/C DAY/GOALS/PLACE: Plans to discharge to TCU.  OTHER IMPORTANT INFO: Contact precautions maintained for VRE.

## 2023-05-25 NOTE — PROGRESS NOTES
Summary:  Present to ED d/t inability to properly care for himself at home (failure to thrive) and diarrhea. UTI.  DATE & TIME: 05/24/23, [id number]  Cognitive Concerns/ Orientation: A&Ox4  BEHAVIOR & AGGRESSION TOOL COLOR: Green  ABNL VS/O2:  VSS RA  MOBILITY: Ax1-2, slide board to WC, able to turn self in bed.   PAIN MANAGMENT: On scheduled oxy  DIET: Regular  BOWEL/BLADDER: Neurogenic bladder and bowel. Straight cath as needed. Pt declines during this shift.   ABNL LAB/BG: None  DRAIN/DEVICES: PIV SL  TELEMETRY RHYTHM: N/A  SKIN: Old healing would on L hip, suspected pressure injury on R hip, WOC following. Scabs on toes ADRIANA. See wound care orders.  TESTS/PROCEDURES: Stool test for enteric bacteria negative   D/C DAY/GOALS/PLACE: Plans to discharge to TCU  OTHER IMPORTANT INFO: Contact precautions maintained for VRE

## 2023-05-25 NOTE — PLAN OF CARE
Summary:  Present to ED d/t inability to properly care for himself at home (failure to thrive) and diarrhea. UTI. - contact precautions.    DATE & TIME: 05/25/23 9608-4332  Cognitive Concerns/ Orientation: A&Ox4  BEHAVIOR & AGGRESSION TOOL COLOR: Green  ABNL VS/O2:  VSS RA  MOBILITY: Ax1, slide board to WC, able to turn self in bed w/ 1 assist.   PAIN MANAGMENT: On scheduled oxy. Gave PRN oxy x1 - effective.  DIET: Regular  BOWEL/BLADDER: Neurogenic bladder and bowel. 2 soft BM. Straight cath 650 ml.  ABNL LAB/BG: None  DRAIN/DEVICES: R PIV SL  TELEMETRY RHYTHM: N/A  SKIN: Old healing would on L hip, suspected pressure injury on R hip, WOC following. Scabs on toes ADRIANA. See wound care orders.  TESTS/PROCEDURES: None  D/C DAY/GOALS/PLACE: Discharged to TCU 5/25/23 @1540  OTHER IMPORTANT INFO: Contact precautions maintained for VRE.

## 2023-05-25 NOTE — PLAN OF CARE
Occupational Therapy Discharge Summary    Reason for therapy discharge:    Discharged to transitional care facility.    Progress towards therapy goal(s). See goals on Care Plan in [facility] electronic health record for goal details.  Goals partially met.  Barriers to achieving goals:   discharge from facility.    Therapy recommendation(s):    Continued therapy is recommended.  Rationale/Recommendations:  Pt reports I with ADLs and functional transfer via sliding board to and from .c. at baseline. Pt currently limited due to weakness, impaired activity tolerance, balance, etc and will require TCU with a gym for B UE strengthening and activity tolerance to increase safety and I with ADL's and functional transfers. Pt will need hospital bed when returning home and A with IADL's ie cleaning, shopping, bathing initially and home OT and PT.

## 2023-05-25 NOTE — PROGRESS NOTES
Care Management Follow Up    Length of Stay (days): 5    Expected Discharge Date: 05/26/2023     Concerns to be Addressed:       Patient plan of care discussed at interdisciplinary rounds: Yes    Anticipated Discharge Disposition: Transitional Care     Anticipated Discharge Services:    Anticipated Discharge DME:      Patient/family educated on Medicare website which has current facility and service quality ratings:    Education Provided on the Discharge Plan:    Patient/Family in Agreement with the Plan:      Referrals Placed by CM/SW: Post Acute Facilities  Private pay costs discussed: Transportation    Additional Information:  Patient was accepted for admission today by [facility] ( previously [facility]) into a private room at no additional cost. They can accept patient after 1500.  Patient accepts this is the TCU available.  Patient asked if he will discharge today or tomorrow. Explained the TCU can accept patient today so he will discharge today if MD feels he is stable for discharge. Patient accepting.   Sent a Mineful message to Dr Ford updating her.    NEAL CraftSW

## 2023-06-11 ENCOUNTER — HEALTH MAINTENANCE LETTER (OUTPATIENT)
Age: 63
End: 2023-06-11

## 2023-06-11 ENCOUNTER — LAB REQUISITION (OUTPATIENT)
Dept: LAB | Facility: CLINIC | Age: 63
End: 2023-06-11
Payer: COMMERCIAL

## 2023-06-11 DIAGNOSIS — N62 HYPERTROPHY OF BREAST: ICD-10-CM

## 2023-06-12 ENCOUNTER — LAB REQUISITION (OUTPATIENT)
Dept: LAB | Facility: CLINIC | Age: 63
End: 2023-06-12
Payer: COMMERCIAL

## 2023-06-12 DIAGNOSIS — R41.82 ALTERED MENTAL STATUS, UNSPECIFIED: ICD-10-CM

## 2023-06-12 LAB
ALBUMIN UR-MCNC: 50 MG/DL
APPEARANCE UR: ABNORMAL
BACTERIA #/AREA URNS HPF: ABNORMAL /HPF
BILIRUB UR QL STRIP: NEGATIVE
COLOR UR AUTO: YELLOW
ESTRADIOL SERPL-MCNC: 18 PG/ML
GLUCOSE UR STRIP-MCNC: NEGATIVE MG/DL
HGB UR QL STRIP: ABNORMAL
KETONES UR STRIP-MCNC: NEGATIVE MG/DL
LEUKOCYTE ESTERASE UR QL STRIP: ABNORMAL
LH SERPL-ACNC: 13.7 MIU/ML (ref 1.7–8.6)
NITRATE UR QL: POSITIVE
PH UR STRIP: 7 [PH] (ref 5–7)
PROLACTIN SERPL 3RD IS-MCNC: 26 NG/ML (ref 4–15)
RBC URINE: 11 /HPF
SP GR UR STRIP: 1.01 (ref 1–1.03)
TRANSITIONAL EPI: 1 /HPF
UROBILINOGEN UR STRIP-MCNC: NORMAL MG/DL
WBC URINE: >182 /HPF

## 2023-06-12 PROCEDURE — P9604 ONE-WAY ALLOW PRORATED TRIP: HCPCS | Performed by: FAMILY MEDICINE

## 2023-06-12 PROCEDURE — 83002 ASSAY OF GONADOTROPIN (LH): CPT | Mod: ORL | Performed by: FAMILY MEDICINE

## 2023-06-12 PROCEDURE — 84146 ASSAY OF PROLACTIN: CPT | Mod: ORL | Performed by: FAMILY MEDICINE

## 2023-06-12 PROCEDURE — 36415 COLL VENOUS BLD VENIPUNCTURE: CPT | Mod: ORL | Performed by: FAMILY MEDICINE

## 2023-06-12 PROCEDURE — 82670 ASSAY OF TOTAL ESTRADIOL: CPT | Mod: ORL | Performed by: FAMILY MEDICINE

## 2023-06-12 PROCEDURE — 87086 URINE CULTURE/COLONY COUNT: CPT | Mod: ORL | Performed by: FAMILY MEDICINE

## 2023-06-12 PROCEDURE — 84403 ASSAY OF TOTAL TESTOSTERONE: CPT | Mod: ORL | Performed by: FAMILY MEDICINE

## 2023-06-12 PROCEDURE — 81001 URINALYSIS AUTO W/SCOPE: CPT | Mod: ORL | Performed by: FAMILY MEDICINE

## 2023-06-14 LAB
BACTERIA UR CULT: ABNORMAL
TESTOST SERPL-MCNC: 341 NG/DL (ref 240–950)

## 2023-09-08 ENCOUNTER — APPOINTMENT (OUTPATIENT)
Dept: CT IMAGING | Facility: CLINIC | Age: 63
DRG: 690 | End: 2023-09-08
Attending: EMERGENCY MEDICINE
Payer: COMMERCIAL

## 2023-09-08 ENCOUNTER — HOSPITAL ENCOUNTER (INPATIENT)
Facility: CLINIC | Age: 63
LOS: 1 days | Discharge: HOME HEALTH | DRG: 690 | End: 2023-09-11
Attending: EMERGENCY MEDICINE | Admitting: STUDENT IN AN ORGANIZED HEALTH CARE EDUCATION/TRAINING PROGRAM
Payer: COMMERCIAL

## 2023-09-08 DIAGNOSIS — I82.4Z2 DEEP VEIN THROMBOSIS (DVT) OF DISTAL VEIN OF LEFT LOWER EXTREMITY, UNSPECIFIED CHRONICITY (H): ICD-10-CM

## 2023-09-08 DIAGNOSIS — T50.904A DRUG OVERDOSE OF UNDETERMINED INTENT, INITIAL ENCOUNTER: ICD-10-CM

## 2023-09-08 DIAGNOSIS — M62.838 MUSCLE SPASM: ICD-10-CM

## 2023-09-08 DIAGNOSIS — Z86.19 HISTORY OF CLOSTRIDIOIDES DIFFICILE COLITIS: ICD-10-CM

## 2023-09-08 DIAGNOSIS — R82.81 PYURIA: ICD-10-CM

## 2023-09-08 DIAGNOSIS — T83.511A URINARY TRACT INFECTION ASSOCIATED WITH CATHETERIZATION OF URINARY TRACT, UNSPECIFIED INDWELLING URINARY CATHETER TYPE, INITIAL ENCOUNTER (H): Primary | ICD-10-CM

## 2023-09-08 DIAGNOSIS — Z86.718 HISTORY OF DEEP VENOUS THROMBOSIS: ICD-10-CM

## 2023-09-08 DIAGNOSIS — N39.0 URINARY TRACT INFECTION ASSOCIATED WITH CATHETERIZATION OF URINARY TRACT, UNSPECIFIED INDWELLING URINARY CATHETER TYPE, INITIAL ENCOUNTER (H): Primary | ICD-10-CM

## 2023-09-08 DIAGNOSIS — R41.82 ALTERED MENTAL STATUS, UNSPECIFIED ALTERED MENTAL STATUS TYPE: ICD-10-CM

## 2023-09-08 DIAGNOSIS — K59.03 DRUG-INDUCED CONSTIPATION: ICD-10-CM

## 2023-09-08 LAB
ALBUMIN SERPL BCG-MCNC: 3.4 G/DL (ref 3.5–5.2)
ALP SERPL-CCNC: 171 U/L (ref 40–129)
ALT SERPL W P-5'-P-CCNC: 26 U/L (ref 0–70)
ANION GAP SERPL CALCULATED.3IONS-SCNC: 15 MMOL/L (ref 7–15)
APAP SERPL-MCNC: <5 UG/ML (ref 10–30)
AST SERPL W P-5'-P-CCNC: 44 U/L (ref 0–45)
ATRIAL RATE - MUSE: 72 BPM
BASOPHILS # BLD AUTO: 0.1 10E3/UL (ref 0–0.2)
BASOPHILS NFR BLD AUTO: 0 %
BILIRUB SERPL-MCNC: 0.3 MG/DL
BUN SERPL-MCNC: 22.6 MG/DL (ref 8–23)
CALCIUM SERPL-MCNC: 8.7 MG/DL (ref 8.8–10.2)
CHLORIDE SERPL-SCNC: 97 MMOL/L (ref 98–107)
CREAT SERPL-MCNC: 0.82 MG/DL (ref 0.67–1.17)
DEPRECATED HCO3 PLAS-SCNC: 23 MMOL/L (ref 22–29)
DIASTOLIC BLOOD PRESSURE - MUSE: NORMAL MMHG
EGFRCR SERPLBLD CKD-EPI 2021: >90 ML/MIN/1.73M2
EOSINOPHIL # BLD AUTO: 0.2 10E3/UL (ref 0–0.7)
EOSINOPHIL NFR BLD AUTO: 1 %
ERYTHROCYTE [DISTWIDTH] IN BLOOD BY AUTOMATED COUNT: 18.4 % (ref 10–15)
ETHANOL SERPL-MCNC: <0.01 G/DL
GLUCOSE SERPL-MCNC: 117 MG/DL (ref 70–99)
HCT VFR BLD AUTO: 33.4 % (ref 40–53)
HGB BLD-MCNC: 10.5 G/DL (ref 13.3–17.7)
IMM GRANULOCYTES # BLD: 0.2 10E3/UL
IMM GRANULOCYTES NFR BLD: 1 %
INTERPRETATION ECG - MUSE: NORMAL
LYMPHOCYTES # BLD AUTO: 0.8 10E3/UL (ref 0.8–5.3)
LYMPHOCYTES NFR BLD AUTO: 5 %
MCH RBC QN AUTO: 24.5 PG (ref 26.5–33)
MCHC RBC AUTO-ENTMCNC: 31.4 G/DL (ref 31.5–36.5)
MCV RBC AUTO: 78 FL (ref 78–100)
MONOCYTES # BLD AUTO: 2.3 10E3/UL (ref 0–1.3)
MONOCYTES NFR BLD AUTO: 15 %
NEUTROPHILS # BLD AUTO: 12.2 10E3/UL (ref 1.6–8.3)
NEUTROPHILS NFR BLD AUTO: 78 %
NRBC # BLD AUTO: 0 10E3/UL
NRBC BLD AUTO-RTO: 0 /100
P AXIS - MUSE: -3 DEGREES
PLATELET # BLD AUTO: 408 10E3/UL (ref 150–450)
POTASSIUM SERPL-SCNC: 4.2 MMOL/L (ref 3.4–5.3)
PR INTERVAL - MUSE: 154 MS
PROT SERPL-MCNC: 7.2 G/DL (ref 6.4–8.3)
QRS DURATION - MUSE: 94 MS
QT - MUSE: 390 MS
QTC - MUSE: 427 MS
R AXIS - MUSE: 18 DEGREES
RBC # BLD AUTO: 4.28 10E6/UL (ref 4.4–5.9)
SALICYLATES SERPL-MCNC: <0.3 MG/DL
SODIUM SERPL-SCNC: 135 MMOL/L (ref 136–145)
SYSTOLIC BLOOD PRESSURE - MUSE: NORMAL MMHG
T AXIS - MUSE: 44 DEGREES
VENTRICULAR RATE- MUSE: 72 BPM
WBC # BLD AUTO: 15.7 10E3/UL (ref 4–11)

## 2023-09-08 PROCEDURE — 85025 COMPLETE CBC W/AUTO DIFF WBC: CPT | Performed by: EMERGENCY MEDICINE

## 2023-09-08 PROCEDURE — 80179 DRUG ASSAY SALICYLATE: CPT | Performed by: EMERGENCY MEDICINE

## 2023-09-08 PROCEDURE — 258N000003 HC RX IP 258 OP 636: Performed by: EMERGENCY MEDICINE

## 2023-09-08 PROCEDURE — 36415 COLL VENOUS BLD VENIPUNCTURE: CPT | Performed by: EMERGENCY MEDICINE

## 2023-09-08 PROCEDURE — 96361 HYDRATE IV INFUSION ADD-ON: CPT

## 2023-09-08 PROCEDURE — 82077 ASSAY SPEC XCP UR&BREATH IA: CPT | Performed by: EMERGENCY MEDICINE

## 2023-09-08 PROCEDURE — 99285 EMERGENCY DEPT VISIT HI MDM: CPT | Mod: 25

## 2023-09-08 PROCEDURE — 93005 ELECTROCARDIOGRAM TRACING: CPT

## 2023-09-08 PROCEDURE — 96360 HYDRATION IV INFUSION INIT: CPT

## 2023-09-08 PROCEDURE — 70450 CT HEAD/BRAIN W/O DYE: CPT

## 2023-09-08 PROCEDURE — 80053 COMPREHEN METABOLIC PANEL: CPT | Performed by: EMERGENCY MEDICINE

## 2023-09-08 PROCEDURE — 80143 DRUG ASSAY ACETAMINOPHEN: CPT | Performed by: EMERGENCY MEDICINE

## 2023-09-08 RX ORDER — NALOXONE HYDROCHLORIDE 0.4 MG/ML
0.4 INJECTION, SOLUTION INTRAMUSCULAR; INTRAVENOUS; SUBCUTANEOUS
Status: DISCONTINUED | OUTPATIENT
Start: 2023-09-08 | End: 2023-09-11 | Stop reason: HOSPADM

## 2023-09-08 RX ADMIN — SODIUM CHLORIDE 1000 ML: 9 INJECTION, SOLUTION INTRAVENOUS at 19:53

## 2023-09-08 ASSESSMENT — ACTIVITIES OF DAILY LIVING (ADL)
ADLS_ACUITY_SCORE: 35
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
## 2023-09-08 NOTE — ED NOTES
Bed: ED29  Expected date: 9/8/23  Expected time: 6:15 PM  Means of arrival: Ambulance  Comments:  131 52m sarita, narcan  ETA 1829

## 2023-09-09 PROBLEM — R82.81 PYURIA: Status: ACTIVE | Noted: 2023-09-09

## 2023-09-09 PROBLEM — T50.904A DRUG OVERDOSE OF UNDETERMINED INTENT, INITIAL ENCOUNTER: Status: ACTIVE | Noted: 2023-09-09

## 2023-09-09 PROBLEM — R41.82 ALTERED MENTAL STATUS, UNSPECIFIED ALTERED MENTAL STATUS TYPE: Status: ACTIVE | Noted: 2023-09-09

## 2023-09-09 LAB
ALBUMIN UR-MCNC: 30 MG/DL
AMPHETAMINES UR QL SCN: NORMAL
APPEARANCE UR: ABNORMAL
BACTERIA #/AREA URNS HPF: ABNORMAL /HPF
BARBITURATES UR QL SCN: NORMAL
BENZODIAZ UR QL SCN: NORMAL
BILIRUB UR QL STRIP: NEGATIVE
BZE UR QL SCN: NORMAL
CANNABINOIDS UR QL SCN: NORMAL
COLOR UR AUTO: ABNORMAL
GLUCOSE UR STRIP-MCNC: NEGATIVE MG/DL
HGB UR QL STRIP: ABNORMAL
KETONES UR STRIP-MCNC: NEGATIVE MG/DL
LEUKOCYTE ESTERASE UR QL STRIP: ABNORMAL
NITRATE UR QL: POSITIVE
OPIATES UR QL SCN: NORMAL
PCP QUAL URINE (ROCHE): NORMAL
PH UR STRIP: 6.5 [PH] (ref 5–7)
RBC URINE: 8 /HPF
SP GR UR STRIP: 1.01 (ref 1–1.03)
UROBILINOGEN UR STRIP-MCNC: NORMAL MG/DL
WBC CLUMPS #/AREA URNS HPF: PRESENT /HPF
WBC URINE: >182 /HPF

## 2023-09-09 PROCEDURE — 87088 URINE BACTERIA CULTURE: CPT | Performed by: EMERGENCY MEDICINE

## 2023-09-09 PROCEDURE — 258N000003 HC RX IP 258 OP 636: Performed by: EMERGENCY MEDICINE

## 2023-09-09 PROCEDURE — 250N000013 HC RX MED GY IP 250 OP 250 PS 637: Performed by: EMERGENCY MEDICINE

## 2023-09-09 PROCEDURE — G0378 HOSPITAL OBSERVATION PER HR: HCPCS

## 2023-09-09 PROCEDURE — 81001 URINALYSIS AUTO W/SCOPE: CPT | Performed by: EMERGENCY MEDICINE

## 2023-09-09 PROCEDURE — 80307 DRUG TEST PRSMV CHEM ANLYZR: CPT | Performed by: EMERGENCY MEDICINE

## 2023-09-09 PROCEDURE — 250N000013 HC RX MED GY IP 250 OP 250 PS 637: Performed by: STUDENT IN AN ORGANIZED HEALTH CARE EDUCATION/TRAINING PROGRAM

## 2023-09-09 PROCEDURE — 96374 THER/PROPH/DIAG INJ IV PUSH: CPT

## 2023-09-09 PROCEDURE — 87040 BLOOD CULTURE FOR BACTERIA: CPT | Performed by: EMERGENCY MEDICINE

## 2023-09-09 PROCEDURE — 96375 TX/PRO/DX INJ NEW DRUG ADDON: CPT

## 2023-09-09 PROCEDURE — 36415 COLL VENOUS BLD VENIPUNCTURE: CPT | Performed by: EMERGENCY MEDICINE

## 2023-09-09 PROCEDURE — 250N000011 HC RX IP 250 OP 636: Mod: JZ | Performed by: EMERGENCY MEDICINE

## 2023-09-09 PROCEDURE — 99223 1ST HOSP IP/OBS HIGH 75: CPT | Performed by: STUDENT IN AN ORGANIZED HEALTH CARE EDUCATION/TRAINING PROGRAM

## 2023-09-09 PROCEDURE — 250N000011 HC RX IP 250 OP 636: Performed by: EMERGENCY MEDICINE

## 2023-09-09 PROCEDURE — 258N000003 HC RX IP 258 OP 636: Performed by: STUDENT IN AN ORGANIZED HEALTH CARE EDUCATION/TRAINING PROGRAM

## 2023-09-09 RX ORDER — DULOXETIN HYDROCHLORIDE 60 MG/1
60 CAPSULE, DELAYED RELEASE ORAL DAILY
Status: DISCONTINUED | OUTPATIENT
Start: 2023-09-09 | End: 2023-09-11 | Stop reason: HOSPADM

## 2023-09-09 RX ORDER — OXYCODONE HYDROCHLORIDE 5 MG/1
10 TABLET ORAL EVERY 4 HOURS PRN
Status: ON HOLD | COMMUNITY
End: 2024-04-08

## 2023-09-09 RX ORDER — VANCOMYCIN HYDROCHLORIDE 1 G/200ML
1000 INJECTION, SOLUTION INTRAVENOUS EVERY 12 HOURS
Status: DISCONTINUED | OUTPATIENT
Start: 2023-09-10 | End: 2023-09-10 | Stop reason: DRUGHIGH

## 2023-09-09 RX ORDER — VANCOMYCIN HYDROCHLORIDE 125 MG/1
125 CAPSULE ORAL 2 TIMES DAILY
Status: DISCONTINUED | OUTPATIENT
Start: 2023-09-09 | End: 2023-09-11 | Stop reason: HOSPADM

## 2023-09-09 RX ORDER — NITROFURANTOIN 25; 75 MG/1; MG/1
100 CAPSULE ORAL 2 TIMES DAILY
Qty: 14 CAPSULE | Refills: 0 | Status: SHIPPED | OUTPATIENT
Start: 2023-09-09 | End: 2023-09-11

## 2023-09-09 RX ORDER — KETOROLAC TROMETHAMINE 15 MG/ML
15 INJECTION, SOLUTION INTRAMUSCULAR; INTRAVENOUS ONCE
Status: COMPLETED | OUTPATIENT
Start: 2023-09-09 | End: 2023-09-09

## 2023-09-09 RX ORDER — SENNOSIDES 8.6 MG
1 TABLET ORAL 2 TIMES DAILY
Status: DISCONTINUED | OUTPATIENT
Start: 2023-09-09 | End: 2023-09-11 | Stop reason: HOSPADM

## 2023-09-09 RX ORDER — OXYCODONE HYDROCHLORIDE 5 MG/1
5 TABLET ORAL EVERY 6 HOURS PRN
COMMUNITY
End: 2024-04-03

## 2023-09-09 RX ORDER — OXYBUTYNIN CHLORIDE 5 MG/1
5 TABLET ORAL 3 TIMES DAILY
Status: DISCONTINUED | OUTPATIENT
Start: 2023-09-09 | End: 2023-09-11 | Stop reason: HOSPADM

## 2023-09-09 RX ORDER — ACETAMINOPHEN 500 MG
1000 TABLET ORAL ONCE
Status: COMPLETED | OUTPATIENT
Start: 2023-09-09 | End: 2023-09-09

## 2023-09-09 RX ORDER — BACLOFEN 20 MG/1
20 TABLET ORAL 4 TIMES DAILY PRN
Status: DISCONTINUED | OUTPATIENT
Start: 2023-09-09 | End: 2023-09-10

## 2023-09-09 RX ORDER — OXYCODONE HYDROCHLORIDE 5 MG/1
15 TABLET ORAL 3 TIMES DAILY
Status: DISCONTINUED | OUTPATIENT
Start: 2023-09-09 | End: 2023-09-11 | Stop reason: HOSPADM

## 2023-09-09 RX ORDER — SODIUM CHLORIDE, SODIUM LACTATE, POTASSIUM CHLORIDE, CALCIUM CHLORIDE 600; 310; 30; 20 MG/100ML; MG/100ML; MG/100ML; MG/100ML
INJECTION, SOLUTION INTRAVENOUS CONTINUOUS
Status: DISCONTINUED | OUTPATIENT
Start: 2023-09-09 | End: 2023-09-10

## 2023-09-09 RX ORDER — OXYCODONE HYDROCHLORIDE 5 MG/1
5 TABLET ORAL EVERY 6 HOURS PRN
Status: DISCONTINUED | OUTPATIENT
Start: 2023-09-09 | End: 2023-09-11 | Stop reason: HOSPADM

## 2023-09-09 RX ORDER — ONDANSETRON 2 MG/ML
4 INJECTION INTRAMUSCULAR; INTRAVENOUS EVERY 6 HOURS PRN
Status: DISCONTINUED | OUTPATIENT
Start: 2023-09-09 | End: 2023-09-11 | Stop reason: HOSPADM

## 2023-09-09 RX ORDER — CEFTRIAXONE 2 G/1
2 INJECTION, POWDER, FOR SOLUTION INTRAMUSCULAR; INTRAVENOUS ONCE
Status: COMPLETED | OUTPATIENT
Start: 2023-09-09 | End: 2023-09-09

## 2023-09-09 RX ORDER — ONDANSETRON 4 MG/1
4 TABLET, ORALLY DISINTEGRATING ORAL EVERY 6 HOURS PRN
Status: DISCONTINUED | OUTPATIENT
Start: 2023-09-09 | End: 2023-09-11 | Stop reason: HOSPADM

## 2023-09-09 RX ORDER — PREGABALIN 100 MG/1
100 CAPSULE ORAL 2 TIMES DAILY
Status: DISCONTINUED | OUTPATIENT
Start: 2023-09-09 | End: 2023-09-11 | Stop reason: HOSPADM

## 2023-09-09 RX ORDER — BACLOFEN 20 MG/1
20 TABLET ORAL 4 TIMES DAILY
Status: DISCONTINUED | OUTPATIENT
Start: 2023-09-09 | End: 2023-09-09

## 2023-09-09 RX ORDER — CEFTRIAXONE 2 G/1
2 INJECTION, POWDER, FOR SOLUTION INTRAMUSCULAR; INTRAVENOUS EVERY EVENING
Status: DISCONTINUED | OUTPATIENT
Start: 2023-09-10 | End: 2023-09-11

## 2023-09-09 RX ADMIN — DULOXETINE HYDROCHLORIDE 60 MG: 60 CAPSULE, DELAYED RELEASE ORAL at 21:08

## 2023-09-09 RX ADMIN — ACETAMINOPHEN 1000 MG: 500 TABLET, FILM COATED ORAL at 17:53

## 2023-09-09 RX ADMIN — VANCOMYCIN HYDROCHLORIDE 1750 MG: 10 INJECTION, POWDER, LYOPHILIZED, FOR SOLUTION INTRAVENOUS at 18:55

## 2023-09-09 RX ADMIN — SENNOSIDES 1 TABLET: 8.6 TABLET, FILM COATED ORAL at 21:08

## 2023-09-09 RX ADMIN — APIXABAN 5 MG: 5 TABLET, FILM COATED ORAL at 21:08

## 2023-09-09 RX ADMIN — PREGABALIN 100 MG: 100 CAPSULE ORAL at 22:03

## 2023-09-09 RX ADMIN — KETOROLAC TROMETHAMINE 15 MG: 15 INJECTION, SOLUTION INTRAMUSCULAR; INTRAVENOUS at 17:51

## 2023-09-09 RX ADMIN — BACLOFEN 20 MG: 20 TABLET ORAL at 21:08

## 2023-09-09 RX ADMIN — VANCOMYCIN HYDROCHLORIDE 125 MG: 125 CAPSULE ORAL at 22:03

## 2023-09-09 RX ADMIN — OXYCODONE HYDROCHLORIDE 15 MG: 5 TABLET ORAL at 21:08

## 2023-09-09 RX ADMIN — CEFTRIAXONE SODIUM 2 G: 2 INJECTION, POWDER, FOR SOLUTION INTRAMUSCULAR; INTRAVENOUS at 17:55

## 2023-09-09 RX ADMIN — SODIUM CHLORIDE, POTASSIUM CHLORIDE, SODIUM LACTATE AND CALCIUM CHLORIDE: 600; 310; 30; 20 INJECTION, SOLUTION INTRAVENOUS at 21:11

## 2023-09-09 RX ADMIN — OXYBUTYNIN CHLORIDE 5 MG: 5 TABLET ORAL at 23:37

## 2023-09-09 ASSESSMENT — ACTIVITIES OF DAILY LIVING (ADL)
ADLS_ACUITY_SCORE: 39
ADLS_ACUITY_SCORE: 39
ADLS_ACUITY_SCORE: 35
ADLS_ACUITY_SCORE: 39
ADLS_ACUITY_SCORE: 39
ADLS_ACUITY_SCORE: 35
ADLS_ACUITY_SCORE: 35
ADLS_ACUITY_SCORE: 39
ADLS_ACUITY_SCORE: 39
ADLS_ACUITY_SCORE: 35
ADLS_ACUITY_SCORE: 39
ADLS_ACUITY_SCORE: 35
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
## 2023-09-09 NOTE — PHARMACY-ADMISSION MEDICATION HISTORY
Pharmacist Admission Medication History    Admission medication history is complete. The information provided in this note is only as accurate as the sources available at the time of the update.    Medication reconciliation/reorder completed by provider prior to medication history? No    Information Source(s): Patient and CareEverywhere/SureScripts via in-person    Pertinent Information:   Last fills in SureScripts:  9/7/23 oxycodone 15mg IR #6 for 2 day supply  9/7/23 oxycodone 5mg #8 for 2 day supply  7/2/23 oxycodone 15mg IR #8 for 2 days supply  -See  Comments/Pain Notes for  [place] Review - multiple Rx for oxycodone written and filled in August 2023 but not listed as sold. He reports that he has been in assisted living until 3 days ago.    -He reports receiving cyclobenzaprine 5mg TID in assisted living and has Rx at pharmacy that he has not picked up.  -He has also not picked up refill for apixaban so has not taken since left assisted living.  -Duloxetine 60mg last filled 9/7/23 #30 for 30 day supply - he was not sure if taking, he may not have picked up from pharmacy.    Changes made to PTA medication list:  Added: None  Deleted: None  Changed: oxybutynin 10mg BID to 5mg TID; oxycodone to 5mg x 3 TID and 5mg q6h prn    Medication History Completed By: Larissa Simmons [facility] 9/9/2023 5:21 PM    Prior to Admission medications    Medication Sig Last Dose Taking? Auth Provider Long Term End Date   baclofen (LIORESAL) 20 MG tablet Take 20 mg by mouth 4 times daily At 0900, 1100, 1500, 2100. Past Week Yes Reported, Patient     naloxone (NARCAN) 4 MG/0.1ML nasal spray Spray 4 mg into one nostril alternating nostrils as needed for opioid reversal every 2-3 minutes until assistance arrives  Yes Unknown, Entered By History              oxybutynin (DITROPAN) 5 MG tablet Take 5 mg by mouth 3 times daily Past Week Yes Unknown, Entered By History     oxyCODONE (ROXICODONE) 5 MG tablet Take 15 mg by mouth 3 times daily  9/8/2023 Yes Unknown, Entered By History     oxyCODONE (ROXICODONE) 5 MG tablet Take 5 mg by mouth every 6 hours as needed for severe pain Past Week Yes Unknown, Entered By History     polyethylene glycol (MIRALAX) 17 GM/Dose powder Take 17 g by mouth daily as needed for constipation  Patient taking differently: Take 17 g by mouth daily Past Week Yes Alejandrina Ford MD     pregabalin (LYRICA) 100 MG capsule Take 1 capsule (100 mg) by mouth 2 times daily 9/8/2023 Yes Alejandrina Ford MD Yes    senna (SENOKOT) 8.6 MG tablet Take 1 tablet by mouth 2 times daily Past Week Yes Unknown, Entered By History     senna-docusate (SENOKOT-S/PERICOLACE) 8.6-50 MG tablet Take 1 tablet by mouth 2 times daily  Patient taking differently: Take 1 tablet by mouth 2 times daily as needed Past Week Yes Alejandrina Ford MD     apixaban ANTICOAGULANT (ELIQUIS) 5 MG tablet Take 5 mg by mouth 2 times daily Not Taking at since left [place] ~ 3 days ago  Unknown, Entered By History     clonazePAM (KLONOPIN) 1 MG tablet Take 1 tablet (1 mg) by mouth every 6 hours Not Taking at Last fill 7/2/23 #6 tabs  Alejandrina Ford MD Yes    DULoxetine (CYMBALTA) 60 MG capsule Take 1 capsule (60 mg) by mouth daily Unknown at He was not sure  Alejandrina Ford MD Yes    ferrous sulfate (FEROSUL) 325 (65 Fe) MG tablet Take 325 mg by mouth daily (with breakfast) Not Taking  Unknown, Entered By History     folic acid (FOLVITE) 1 MG tablet Take 1 mg by mouth daily Not Taking  Unknown, Entered By History     oxyCODONE HCl (ROXICODONE) 20 MG TABS immediate release tablet Take 20 mg by mouth 2 times daily Not Taking  Alejandrina Ford MD     oxyCODONE IR (ROXICODONE) 15 MG tablet Take 1 tablet (15 mg) by mouth every 4 hours as needed for severe pain Not taking  Alejandrina Ford MD     simethicone (MYLICON) 125 MG chewable tablet Take 125 mg by mouth 3 times daily as needed   Unknown, Entered By History

## 2023-09-09 NOTE — ED PROVIDER NOTES
History     Chief Complaint:  Drug Overdose and unresponsive       History limited by: patient is nonverbal.      Jose Lopez is a 63 year old male with history of polysubstance abuse who presents with drug overdose. The patient was brought in by ambulance from home, where EMS was called today as the patient's roommate noted him to be lying on the floor unresponsive. The patient was given 4 mg narcan twice, once from his family who had narcan in the home, and once by EMS en route to the ED. The patient was discharged from drug rehab yesterday. He also has a potential leaky colostomy bag.  On evaluation the patient's speech is nonsensical and he appears encephalopathic and unable to provide any meaningful history.  No history or evidence of trauma.      Independent Historian:   EMS Personnel - They report the above    Review of External Notes:   I reviewed the patient's discharge summary from 5/25/2023      Medications:    Eliquis  Lioresal  Klonopin  Cymbalta  Narcan  Ditropan  Roxicodone  Lyrica    Past Medical History:    Benzodiazepine dependence  Muscle spasticity  Neurogenic bowel/bladder  Sepsis  Substance abuse  UTI  Gait abnormality  Paraplegic   Calculus of gallbladder    Past Surgical History:    Back surgery  Cholecystectomy  Rectal exam under anesthesia  Rectal fistulotomy    Physical Exam   Patient Vitals for the past 24 hrs:   BP Pulse Resp SpO2   09/08/23 2130 119/60 70 17 97 %   09/08/23 2115 -- 66 17 95 %   09/08/23 2100 109/57 65 22 95 %   09/08/23 2015 -- 73 20 91 %   09/08/23 2000 135/63 -- -- 100 %   09/08/23 1900 -- -- -- 100 %   09/08/23 1851 (!) 143/66 82 18 100 %        Physical Exam  General: Patient in mild distress. Awakens to sternal rub. Unintelligible speech. Altered mental status.  Head:  Scalp is NC/AT  Eyes:  No scleral icterus. Pupils are dilated.  ENT:  The external nose and ears are normal.   Neck:  Normal range of motion without rigidity.  CV:  Regular rate and  rhythm  Resp:  Breath sounds are clear bilaterally    Non-labored, no retractions or accessory muscle use  GI:  Abdomen is soft, no distension, no tenderness. No peritoneal signs. Ostomy bag present  MS:  No lower extremity edema   Skin:  Warm and dry, No rash or lesions noted.  Neuro:  Encephalopathic.  Arouses to sternal rub.  Paraplegic.    Emergency Department Course   ECG  ECG taken at 1945, ECG read at 1951  Normal sinus rhythm  Normal ECG   No change as compared to prior, dated 12/3/18.  Rate 72 bpm. WI interval 154 ms. QRS duration 94 ms. QT/QTc 390/427 ms. P-R-T axes -3 18 44.     Imaging:  Head CT w/o contrast   Final Result   IMPRESSION:   1.  No CT evidence for acute intracranial process.   2.  Brain atrophy and presumed chronic microvascular ischemic changes as above.         Report per radiology    Laboratory:  Labs Ordered and Resulted from Time of ED Arrival to Time of ED Departure   COMPREHENSIVE METABOLIC PANEL - Abnormal       Result Value    Sodium 135 (*)     Potassium 4.2      Chloride 97 (*)     Carbon Dioxide (CO2) 23      Anion Gap 15      Urea Nitrogen 22.6      Creatinine 0.82      Calcium 8.7 (*)     Glucose 117 (*)     Alkaline Phosphatase 171 (*)     AST 44      ALT 26      Protein Total 7.2      Albumin 3.4 (*)     Bilirubin Total 0.3      GFR Estimate >90     ACETAMINOPHEN LEVEL - Abnormal    Acetaminophen <5.0 (*)    CBC WITH PLATELETS AND DIFFERENTIAL - Abnormal    WBC Count 15.7 (*)     RBC Count 4.28 (*)     Hemoglobin 10.5 (*)     Hematocrit 33.4 (*)     MCV 78      MCH 24.5 (*)     MCHC 31.4 (*)     RDW 18.4 (*)     Platelet Count 408      % Neutrophils 78      % Lymphocytes 5      % Monocytes 15      % Eosinophils 1      % Basophils 0      % Immature Granulocytes 1      NRBCs per 100 WBC 0      Absolute Neutrophils 12.2 (*)     Absolute Lymphocytes 0.8      Absolute Monocytes 2.3 (*)     Absolute Eosinophils 0.2      Absolute Basophils 0.1      Absolute Immature Granulocytes  0.2      Absolute NRBCs 0.0     SALICYLATE LEVEL - Normal    Salicylate <0.3     ETHYL ALCOHOL LEVEL - Normal    Alcohol ethyl <0.01            Emergency Department Course & Assessments:      Interventions:  Medications   naloxone (NARCAN) injection 0.4 mg (has no administration in time range)   0.9% sodium chloride BOLUS (1,000 mLs Intravenous $New Bag 9/8/23 1953)        Assessments:  1914 I obtained history and examined the patient as noted above    Independent Interpretation (X-rays, CTs, rhythm strip):  I reviewed the patient's head CT, no evidence of intracranial bleeding.    Consultations/Discussion of Management or Tests:  None        Social Determinants of Health affecting care:   None    Disposition:  Care of the patient was transferred to my colleague Dr. Jose Amaro    Impression & Plan    CMS Diagnoses: None    Medical Decision Making:  Patient is a 63-year-old male who presents following acute drug overdose of undetermined intent.  Patient's medical history and records reviewed.  On my evaluation patient is arousable to sternal rub but continues to be encephalopathic.  Labs, EKGs, and imaging was obtained.  EKG without significant findings.  Head CT without evidence of acute intracranial bleeding or other pathology.  Labs notable for undetectable alcohol/salicylate/acetaminophen level and chronic/stable anemia.  White count elevated (15.7); potential stress/demargination reaction from unresponsive episode, no clinical evidence of infection at this time.  Patient has history of polysubstance abuse and presentation is consistent with acute drug overdose.  Patient will be allowed to sober in the emergency department will be signed out to my partner Dr. Jose Amaro with disposition pending reassessment and clinical sobriety.    Diagnosis:    ICD-10-CM    1. Drug overdose of undetermined intent, initial encounter  T50.904A          Scribe Disclosure:  SONJA, Albaro Shahid, am serving as a scribe at 7:19 PM on  9/8/2023 to document services personally performed by Ramses Mccallum DO based on my observations and the provider's statements to me.   9/8/2023   Ramses Mccallum DO O'Neill, Christopher Warren, DO  09/08/23 2148

## 2023-09-09 NOTE — CONSULTS
DEC consult will be discontinued, provider aware.  Pt declined to participate.  If new assessment is necessary, attending will need to place new order.

## 2023-09-09 NOTE — ED PROVIDER NOTES
I assumed care of the patient from Dr. Li pending clinical sobriety and a mental health assessment.  Mental health 's attempted to evaluate the patient however he refuses to talk to them.  Upon my evaluation he denies suicidal ideation and initially states he wants to go home.  He is quite somnolent but demonstrating no other signs of opioid overdose.  There is no signs of severe sepsis or septic shock.  He does have a leukocytosis and likely chronic urinary tract infection, urine cultures are pending, I have administered a dose of IV ceftriaxone and vancomycin after consultation with the clinical pharmacist. The mental health  did obtain collateral information from the patient's friend/family and they state that they think it is unlikely that this was an intentional overdose.      While attempting to ready the patient for discharge the nurses contacted me stating that the patient does change his mind and feels too weak to go home, he has a roommate who does not assist with his cares and he does not feel that he can care for himself at home.  Given the UTI, his profound somnolence and underlying medical conditions I will bring him into the hospital for further evaluation and treatment.    Joel Rehman MD  09/09/23 1500       Joel Rehman MD  09/09/23 [id number]

## 2023-09-09 NOTE — ED PROVIDER NOTES
Patient care signed out to me by Dr. Mccallum.  Please see his initial ED provider note regarding history of present illness, physical exam, and medical decision making.  In brief patient is a 63-year-old male with a history of polysubstance abuse who presents with a suspected drug overdose.  Patient was found unresponsive by his roommate at home.  He was given intranasal Narcan initially and then given additional Narcan 4 mg by EMS prior to arrival.  He has not required additional Narcan while here in the emergency department but is quite somnolent and sedated.  He is able to be aroused but is still on some mild supplemental oxygen, I think this may be due to undiagnosed obstructive sleep apnea.  Patient is still mildly encephalopathic and I am unable to determine if this is secondary to overdose versus an alternative etiology.  Laboratory work was relatively unremarkable except for nonspecific leukocytosis.  If were able to obtain a urinalysis would be nice to ensure no evidence of urinary tract infection.  Given the responsiveness to Narcan I do feel that this most likely confirms an opiate related overdose.    Unclear to intent behind likely overdose.  Patient continues to sober and will be signed out to my partner Dr. Li pending sober reassessment and final disposition.      ICD-10-CM    1. Drug overdose of undetermined intent, initial encounter  T50.904A       2. Altered mental status, unspecified altered mental status type  R41.82              Jose Amaro MD  09/09/23 0155

## 2023-09-09 NOTE — PROGRESS NOTES
Writer attempted to complete DEC assessment.  Writer introduced self to Pt.  He was awake with his eyes open.  Pt attempted to begin assessment and Pt then closed his eyes refusing to acknowledge Writer or her questions.  Writer attempt to con't to ask question without success.    Writer gathered collateral.      Writer spoke with attending MD Trigger.  Writer informed him of the above and collateral report.  He is aware of the information and that a DEC consult could not be completed.  He  states the Pt will be discharged at this time without consult completed.

## 2023-09-09 NOTE — ED NOTES
Pt is wide awake now and talking a lot.   He has called Candy at his house and she is bringing pt his phone and coming up to the Ed to visit in a bit. Meal ordered.

## 2023-09-09 NOTE — ED NOTES
Reviewd pt chart - he has neurogenic bladder and in the past has needed straight cath for retention over 400cc. Scanned for 800- straight cath removed 700cc- urine sent .
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
## 2023-09-10 ENCOUNTER — APPOINTMENT (OUTPATIENT)
Dept: CT IMAGING | Facility: CLINIC | Age: 63
DRG: 690 | End: 2023-09-10
Attending: HOSPITALIST
Payer: COMMERCIAL

## 2023-09-10 ENCOUNTER — APPOINTMENT (OUTPATIENT)
Dept: CARDIOLOGY | Facility: CLINIC | Age: 63
DRG: 690 | End: 2023-09-10
Attending: STUDENT IN AN ORGANIZED HEALTH CARE EDUCATION/TRAINING PROGRAM
Payer: COMMERCIAL

## 2023-09-10 LAB
ANION GAP SERPL CALCULATED.3IONS-SCNC: 11 MMOL/L (ref 7–15)
BACTERIA UR CULT: ABNORMAL
BASOPHILS # BLD AUTO: 0.1 10E3/UL (ref 0–0.2)
BASOPHILS NFR BLD AUTO: 1 %
BUN SERPL-MCNC: 22.5 MG/DL (ref 8–23)
CALCIUM SERPL-MCNC: 8.8 MG/DL (ref 8.8–10.2)
CHLORIDE SERPL-SCNC: 104 MMOL/L (ref 98–107)
CREAT SERPL-MCNC: 0.55 MG/DL (ref 0.67–1.17)
DEPRECATED HCO3 PLAS-SCNC: 22 MMOL/L (ref 22–29)
EGFRCR SERPLBLD CKD-EPI 2021: >90 ML/MIN/1.73M2
EOSINOPHIL # BLD AUTO: 0.6 10E3/UL (ref 0–0.7)
EOSINOPHIL NFR BLD AUTO: 6 %
ERYTHROCYTE [DISTWIDTH] IN BLOOD BY AUTOMATED COUNT: 17.9 % (ref 10–15)
GLUCOSE SERPL-MCNC: 97 MG/DL (ref 70–99)
HCT VFR BLD AUTO: 29.2 % (ref 40–53)
HGB BLD-MCNC: 9.1 G/DL (ref 13.3–17.7)
IMM GRANULOCYTES # BLD: 0.1 10E3/UL
IMM GRANULOCYTES NFR BLD: 1 %
LVEF ECHO: NORMAL
LYMPHOCYTES # BLD AUTO: 1.1 10E3/UL (ref 0.8–5.3)
LYMPHOCYTES NFR BLD AUTO: 11 %
MCH RBC QN AUTO: 24.5 PG (ref 26.5–33)
MCHC RBC AUTO-ENTMCNC: 31.2 G/DL (ref 31.5–36.5)
MCV RBC AUTO: 79 FL (ref 78–100)
MONOCYTES # BLD AUTO: 1.2 10E3/UL (ref 0–1.3)
MONOCYTES NFR BLD AUTO: 11 %
NEUTROPHILS # BLD AUTO: 7.7 10E3/UL (ref 1.6–8.3)
NEUTROPHILS NFR BLD AUTO: 70 %
NRBC # BLD AUTO: 0 10E3/UL
NRBC BLD AUTO-RTO: 0 /100
PLATELET # BLD AUTO: 374 10E3/UL (ref 150–450)
POTASSIUM SERPL-SCNC: 3.7 MMOL/L (ref 3.4–5.3)
RBC # BLD AUTO: 3.71 10E6/UL (ref 4.4–5.9)
SODIUM SERPL-SCNC: 137 MMOL/L (ref 136–145)
WBC # BLD AUTO: 10.7 10E3/UL (ref 4–11)

## 2023-09-10 PROCEDURE — 36415 COLL VENOUS BLD VENIPUNCTURE: CPT | Performed by: STUDENT IN AN ORGANIZED HEALTH CARE EDUCATION/TRAINING PROGRAM

## 2023-09-10 PROCEDURE — 255N000002 HC RX 255 OP 636: Performed by: HOSPITALIST

## 2023-09-10 PROCEDURE — 258N000003 HC RX IP 258 OP 636

## 2023-09-10 PROCEDURE — G0378 HOSPITAL OBSERVATION PER HR: HCPCS

## 2023-09-10 PROCEDURE — 80048 BASIC METABOLIC PNL TOTAL CA: CPT | Performed by: STUDENT IN AN ORGANIZED HEALTH CARE EDUCATION/TRAINING PROGRAM

## 2023-09-10 PROCEDURE — 96376 TX/PRO/DX INJ SAME DRUG ADON: CPT

## 2023-09-10 PROCEDURE — 999N000208 ECHOCARDIOGRAM COMPLETE

## 2023-09-10 PROCEDURE — 93306 TTE W/DOPPLER COMPLETE: CPT | Mod: 26 | Performed by: INTERNAL MEDICINE

## 2023-09-10 PROCEDURE — 85025 COMPLETE CBC W/AUTO DIFF WBC: CPT | Performed by: STUDENT IN AN ORGANIZED HEALTH CARE EDUCATION/TRAINING PROGRAM

## 2023-09-10 PROCEDURE — 99233 SBSQ HOSP IP/OBS HIGH 50: CPT | Performed by: HOSPITALIST

## 2023-09-10 PROCEDURE — 250N000013 HC RX MED GY IP 250 OP 250 PS 637: Performed by: STUDENT IN AN ORGANIZED HEALTH CARE EDUCATION/TRAINING PROGRAM

## 2023-09-10 PROCEDURE — 120N000001 HC R&B MED SURG/OB

## 2023-09-10 PROCEDURE — 250N000013 HC RX MED GY IP 250 OP 250 PS 637: Performed by: HOSPITALIST

## 2023-09-10 PROCEDURE — 72125 CT NECK SPINE W/O DYE: CPT

## 2023-09-10 PROCEDURE — 250N000011 HC RX IP 250 OP 636: Mod: JZ | Performed by: STUDENT IN AN ORGANIZED HEALTH CARE EDUCATION/TRAINING PROGRAM

## 2023-09-10 PROCEDURE — 250N000011 HC RX IP 250 OP 636

## 2023-09-10 PROCEDURE — 258N000003 HC RX IP 258 OP 636: Performed by: STUDENT IN AN ORGANIZED HEALTH CARE EDUCATION/TRAINING PROGRAM

## 2023-09-10 RX ADMIN — CYCLOBENZAPRINE HYDROCHLORIDE 7.5 MG: 5 TABLET, FILM COATED ORAL at 16:57

## 2023-09-10 RX ADMIN — HUMAN ALBUMIN MICROSPHERES AND PERFLUTREN 9 ML: 10; .22 INJECTION, SOLUTION INTRAVENOUS at 12:16

## 2023-09-10 RX ADMIN — OXYCODONE HYDROCHLORIDE 15 MG: 5 TABLET ORAL at 16:44

## 2023-09-10 RX ADMIN — VANCOMYCIN HYDROCHLORIDE 125 MG: 125 CAPSULE ORAL at 21:25

## 2023-09-10 RX ADMIN — SODIUM CHLORIDE, POTASSIUM CHLORIDE, SODIUM LACTATE AND CALCIUM CHLORIDE: 600; 310; 30; 20 INJECTION, SOLUTION INTRAVENOUS at 08:27

## 2023-09-10 RX ADMIN — SENNOSIDES 1 TABLET: 8.6 TABLET, FILM COATED ORAL at 21:25

## 2023-09-10 RX ADMIN — OXYCODONE HYDROCHLORIDE 5 MG: 5 TABLET ORAL at 01:58

## 2023-09-10 RX ADMIN — OXYCODONE HYDROCHLORIDE 5 MG: 5 TABLET ORAL at 13:12

## 2023-09-10 RX ADMIN — DULOXETINE HYDROCHLORIDE 60 MG: 60 CAPSULE, DELAYED RELEASE ORAL at 08:37

## 2023-09-10 RX ADMIN — OXYBUTYNIN CHLORIDE 5 MG: 5 TABLET ORAL at 08:37

## 2023-09-10 RX ADMIN — OXYCODONE HYDROCHLORIDE 15 MG: 5 TABLET ORAL at 21:25

## 2023-09-10 RX ADMIN — VANCOMYCIN HYDROCHLORIDE 125 MG: 125 CAPSULE ORAL at 08:37

## 2023-09-10 RX ADMIN — OXYBUTYNIN CHLORIDE 5 MG: 5 TABLET ORAL at 16:44

## 2023-09-10 RX ADMIN — OXYBUTYNIN CHLORIDE 5 MG: 5 TABLET ORAL at 21:25

## 2023-09-10 RX ADMIN — OXYCODONE HYDROCHLORIDE 15 MG: 5 TABLET ORAL at 08:36

## 2023-09-10 RX ADMIN — CEFTRIAXONE SODIUM 2 G: 2 INJECTION, POWDER, FOR SOLUTION INTRAMUSCULAR; INTRAVENOUS at 21:25

## 2023-09-10 RX ADMIN — APIXABAN 5 MG: 5 TABLET, FILM COATED ORAL at 08:37

## 2023-09-10 RX ADMIN — VANCOMYCIN HYDROCHLORIDE 1000 MG: 1 INJECTION, SOLUTION INTRAVENOUS at 05:33

## 2023-09-10 RX ADMIN — SENNOSIDES 1 TABLET: 8.6 TABLET, FILM COATED ORAL at 08:37

## 2023-09-10 RX ADMIN — PREGABALIN 100 MG: 100 CAPSULE ORAL at 08:37

## 2023-09-10 RX ADMIN — VANCOMYCIN HYDROCHLORIDE 1500 MG: 10 INJECTION, POWDER, LYOPHILIZED, FOR SOLUTION INTRAVENOUS at 16:44

## 2023-09-10 RX ADMIN — Medication 1 MG: at 01:58

## 2023-09-10 RX ADMIN — PREGABALIN 100 MG: 100 CAPSULE ORAL at 21:25

## 2023-09-10 RX ADMIN — APIXABAN 5 MG: 5 TABLET, FILM COATED ORAL at 21:25

## 2023-09-10 ASSESSMENT — ACTIVITIES OF DAILY LIVING (ADL)
ADLS_ACUITY_SCORE: 43
ADLS_ACUITY_SCORE: 43
ADLS_ACUITY_SCORE: 39
ADLS_ACUITY_SCORE: 39
ADLS_ACUITY_SCORE: 43
ADLS_ACUITY_SCORE: 39
ADLS_ACUITY_SCORE: 43
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
## 2023-09-10 NOTE — PROGRESS NOTES
RECEIVING UNIT ED HANDOFF REVIEW    ED Nurse Handoff Report was reviewed by: Rubina Villareal RN on September 9, 2023 at 7:43 PM

## 2023-09-10 NOTE — PLAN OF CARE
Goal Outcome Evaluation:      Plan of Care Reviewed With: patient  Summary: Acute encephalopathy unclear etiology -resolved, UTI  Possible syncope  Suspected failure to thrive at home  Possible unintentional drug overdose    DATE & TIME: 9/10/23, 9525-7685  Cognitive Concerns/ Orientation : A/O x4   BEHAVIOR & AGGRESSION TOOL COLOR: Green  CIWA SCORE: NA   ABNL VS/O2: VSS on RA (Obs patient, Q4 vital checks)  MOBILITY: Paraplegic, T/R Q2hr. Can turn well.  PT to work with patient this afternoon  PAIN MANAGMENT: reports Back, leg, buttocks, hip pain managed with scheduled and Prn Oxycodone  DIET: Regular, tolerating  BOWEL/BLADDER: Has LUQ colostomy (one piece bag changed), hx of neurogenic bladder (patient straight cath himself at home), bladder scan done and straight cath on this shift x 2 with good output (375 and 475 mL) (urine cloudy, andrew, with sediment)   ABNL LAB/BG: hgb=9.1, creatinine= 0.55, +UA, UC, BC in process  DRAIN/DEVICES: LUQ colostomy bag, 0 mL output, R AC IV/SL   TELEMETRY RHYTHM: NA  SKIN: Existing open areas on buttock/sacrum, blanchable redness on heels, buttocks, reddened area on left hip from old healed wound, dry/peeling skin on feet. Mapilex in place to buttocks, hip and heels. + 1-2 edema to LE bilaterally   TESTS/PROCEDURES: CT and ECHO completed.    D/C DAY/GOALS/PLACE: pending  OTHER IMPORTANT INFO: Neurology consulted. PT/OT/WOC ,SW following. Contact (MRSA, VRE)      Observation goals  PRIOR TO DISCHARGE        Comments:   -diagnostic tests and consults completed and resulted- Not met   -vital signs normal or at patient baseline- YES  -tolerating oral antibiotics or has plans for home infusion setup- not met   -safe disposition plan has been identified-not met    Nurse to notify provider when observation goals have been met and patient is ready for discharge.

## 2023-09-10 NOTE — PHARMACY-VANCOMYCIN DOSING SERVICE
Pharmacy Vancomycin Note  Date of Service September 10, 2023  Patient's  1960   63 year old, male    Indication: Pyelonephritis  Day of Therapy: 2  Current vancomycin regimen:  1000 mg IV q12h  Current vancomycin monitoring method: AUC  Current vancomycin therapeutic monitoring goal: 400-600 mg*h/L    Insight[employer] Prediction of Current Vancomycin Regimen  Regimen: 1000 mg IV every 12 hours.  Start time: 16:00 on 09/10/2023  Exposure target: AUC24 (range)400-600 mg/L.hr   AUC24,ss: 375 mg/L.hr  Probability of AUC24 > 400: 43 %  Ctrough,ss: 10.4 mg/L  Probability of Ctrough,ss > 20: 11 %  Probability of nephrotoxicity (Lodise JOVANNA ): 6 %      Current estimated CrCl = Estimated Creatinine Clearance: 149.4 mL/min (A) (based on SCr of 0.55 mg/dL (L)).    Creatinine for last 3 days  2023:  7:52 PM Creatinine 0.82 mg/dL  9/10/2023:  8:38 AM Creatinine 0.55 mg/dL    Recent Vancomycin Levels (past 3 days)  No results found for requested labs within last 3 days.    Vancomycin IV Administrations (past 72 hours)                     vancomycin (VANCOCIN) 1000 mg in dextrose 5% 200 mL PREMIX (mg) 1,000 mg New Bag 09/10/23 0533    vancomycin (VANCOCIN) 1,750 mg in 0.9% NaCl 500 mL intermittent infusion (mg) 1,750 mg Given 23 1855                    Nephrotoxins and other renal medications (From now, onward)      Start     Dose/Rate Route Frequency Ordered Stop    09/10/23 1600  vancomycin (VANCOCIN) 1,500 mg in 0.9% NaCl 250 mL intermittent infusion         1,500 mg  over 90 Minutes Intravenous EVERY 12 HOURS 09/10/23 1429      23 2100  vancomycin (VANCOCIN) capsule 125 mg         125 mg Oral 2 TIMES DAILY 23 203                 Contrast Orders - past 72 hours (72h ago, onward)      Start     Dose/Rate Route Frequency Stop    09/10/23 1230  perflutren diluted 1mL to 2mL with saline (OPTISON) diluted injection 9 mL         9 mL Intravenous ONCE 09/10/23 1216            Interpretation of levels and  current regimen:  Vancomycin level is reflective of AUC less than 400    Has serum creatinine changed greater than 50% in last 72 hours: No    Urine output:  good urine output    Renal Function: Improving    Insight[employer] Prediction of Planned New Vancomycin Regimen  Regimen: 1500 mg IV every 12 hours.  Start time: 16:00 on 09/10/2023  Exposure target: AUC24 (range)400-600 mg/L.hr   AUC24,ss: 560 mg/L.hr  Probability of AUC24 > 400: 81 %  Ctrough,ss: 16 mg/L  Probability of Ctrough,ss > 20: 34 %  Probability of nephrotoxicity (Lodise JOVANNA 2009): 11 %    Plan:  Increase Dose to 1500 mg q12h  Vancomycin monitoring method: AUC  Vancomycin therapeutic monitoring goal: 400-600 mg*h/L  Pharmacy will check vancomycin levels as appropriate in 1-3 Days.  Serum creatinine levels will be ordered every 48 hours.    Kenya Varela RPH, PharmD, BCPS

## 2023-09-10 NOTE — PLAN OF CARE
Goal Outcome Evaluation:       DATE & TIME: 9/9/23-9/10/23 Night shift   Cognitive Concerns/ Orientation : A/O x4   BEHAVIOR & AGGRESSION TOOL COLOR: Green  CIWA SCORE: NA   ABNL VS/O2: VSS on RA  MOBILITY: Paraplegic, T/R Q2hr.   PAIN MANAGMENT: Back, leg, buttocks, hip pain managed with scheduled and PRN Oxycodone   DIET: Reg  BOWEL/BLADDER: Has LUQ colostomy (one piece bag changed), hx of neurogenic bladder (patient straight cath himself at home), bladder scan done and straight cath on this shift for 500 ml andrew urin output  ABNL LAB/BG: +UA, UC/BC in process  DRAIN/DEVICES: LUQ colostomy bag, R AC IV infusing NA @ 100 ml/hr  TELEMETRY RHYTHM: NA  SKIN: Existing open areas on buttock/sacrum, blanchable redness on heels, buttocks, reddened area on left hip from old healed wound, dry/peeling skin on feet. Mapilex applied to buttocks, hip and heels.  TESTS/PROCEDURES: non this shift  D/C DAY/GOALS/PLACE: pending  OTHER IMPORTANT INFO: PT/OT/WOC ,SW consulted
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
## 2023-09-10 NOTE — PROGRESS NOTES
Admission    Patient arrives to room 614 via cart from ED.  Care plan note: to follow    Inpatient nursing criteria listed below were met:    Did you put disposition on whiteboard and in sticky note: Yes  Full skin assessment done (add LDA if skin issue present). MM :Yes  Isolation education started/completed Yes  Patient allergies verified with patient: Yes.   Fall Risk? (Care plan updated, Education given and documented) Yes  Primary Care Plan initiated: Yes  Home medications documented in belongings flowsheet: NA  Patient belongings documented in belongings flowsheet: Yes  Reminder note (belongings/ medications) placed in discharge instructions:NA  Admission profile/ required documentation complete: Yes  If patient is a 72 hour hold/Commitment are belongings removed from room and locked up? NA

## 2023-09-10 NOTE — PROGRESS NOTES
Observation goals  PRIOR TO DISCHARGE        Comments:   -diagnostic tests and consults completed and resulted- Not met   -vital signs normal or at patient baseline- YES  -tolerating oral antibiotics or has plans for home infusion setup- not met   -safe disposition plan has been identified-not met    Nurse to notify provider when observation goals have been met and patient is ready for discharge.

## 2023-09-10 NOTE — UTILIZATION REVIEW
Admission Status; Secondary Review Determination       Under the authority of the Utilization Management Committee, the utilization review process indicated a secondary review on the above patient. The review outcome is based on review of the medical records, discussions with staff, and applying clinical experience noted on the date of the review.     (x) Inpatient Status Appropriate - This patient's medical care is consistent with medical management for inpatient care and reasonable inpatient medical practice.     RATIONALE FOR DETERMINATION      Patient requires inpatient admission versus short stay observation or outpatient treatment for the following reasons:      The patient is 63-year-old man with history of paraplegia, polysubstance abuse, paraplegia, neurogenic bladder on chronic self catheter, history of frequent  UTIs including MRSA in June 2023, colostomy. The patient was discharged from rehab on September 7 and he was found next day unresponsive on the floor by the roommate.  The patient received Narcan at home and another dose by EMS on route to the hospital.  He was found hypoxemic with pulse ox 87 to 88% for which she received 3 L oxygen.  In emergency room he was found with nonsensical speech, appeared encephalopathic and unable to provide any meaningful history, head CT without acute intracranial process, but brain atrophy.  The drug screen was negative.  The UA showed signs of UTI, the white blood cells were elevated at 15.7 and he was started in emergency room on IV ceftriaxone and vancomycin for presumed urinary tract infection.   Overall he was improving and while the nurse was doing the discharge process, the patient changed his mind stating that he was feeling  too weak to go home, he has a roommate who does not assist with his cares and he does not feel that he can care for himself at home.   Mental health tried to do assessment, but the patient declined to participate.      63-year-old  man, paraplegia, history of UTI MRSA June 2023, discharged from rehab on September 7, was found unresponsive on September 8 and brought to the hospital.  He received Narcan, but UA was negative for drugs.  The patient had  leukocytosis and UA significant for UTI and he was started on ceftriaxone and vancomycin IV.  Considering his recent history of MRSA UTI it is important to treat with iv Vanco and to wait for the urine culture result to tailor the antibiotics before he is discharged home      Dr Wall notified     The expected length of stay at the time of admission was more than 2 nights because of the severity of illness, intensity of service provided, and risk for adverse outcome. Inpatient admission is appropriate.         This document was produced using voice recognition software       The information on this document is developed by the utilization review team in order for the business office to ensure compliance. This only denotes the appropriateness of proper admission status and does not reflect the quality of care rendered.   The definitions of Inpatient Status and Observation Status used in making the determination above are those provided in the CMS Coverage Manual, Chapter 1 and Chapter 6, section 70.4.   Sincerely,   JOHAN LOPEZ MD   Utilization Review  Physician Advisor  [facility]

## 2023-09-10 NOTE — PHARMACY-VANCOMYCIN DOSING SERVICE
Pharmacy Vancomycin Initial Note  Date of Service 2023  Patient's  1960  63 year old, male    Indication: Pyelonephritis    Current estimated CrCl = Estimated Creatinine Clearance: 98.3 mL/min (based on SCr of 0.82 mg/dL).    Creatinine for last 3 days  2023:  7:52 PM Creatinine 0.82 mg/dL    Recent Vancomycin Level(s) for last 3 days  No results found for requested labs within last 3 days.      Vancomycin IV Administrations (past 72 hours)                     vancomycin (VANCOCIN) 1,750 mg in 0.9% NaCl 500 mL intermittent infusion (mg) 1,750 mg Given 23 1855                    Nephrotoxins and other renal medications (From now, onward)      Start     Dose/Rate Route Frequency Ordered Stop    23 2100  vancomycin (VANCOCIN) capsule 125 mg         125 mg Oral 2 TIMES DAILY 23 1610  vancomycin (VANCOCIN) 1,750 mg in 0.9% NaCl 500 mL intermittent infusion         1,750 mg  over 2 Hours Intravenous ONCE 23 1607              Contrast Orders - past 72 hours (72h ago, onward)      None            [employer] Prediction of Planned Initial Vancomycin Regimen    Loading dose: N/A  Regimen: 1000 mg IV every 12 hours.  Start time: 06:55 on 09/10/2023  Exposure target: AUC24 (range)400-600 mg/L.hr   AUC24,ss: 516 mg/L.hr  Probability of AUC24 > 400: 75 %  Ctrough,ss: 16 mg/L  Probability of Ctrough,ss > 20: 32 %  Probability of nephrotoxicity (Lodise JOVANNA ): 11 %    Plan:  Start vancomycin 1000 mg IV q12h.   Vancomycin monitoring method: AUC  Vancomycin therapeutic monitoring goal: 400-600 mg*h/L  Pharmacy will check vancomycin levels as appropriate in 1-3 Days.    Serum creatinine levels will be ordered daily for the first week of therapy and at least twice weekly for subsequent weeks.      CAPO MANZO [facility]

## 2023-09-10 NOTE — PROGRESS NOTES
Observation Goals:    -diagnostic tests and consults completed and resulted - not met  -vital signs normal or at patient baseline -met  -tolerating oral antibiotics or has plans for home infusion setup - not met  -safe disposition plan has been identified - not met  Nurse to notify provider when observation goals have been met and patient is ready for discharge.

## 2023-09-11 ENCOUNTER — APPOINTMENT (OUTPATIENT)
Dept: OCCUPATIONAL THERAPY | Facility: CLINIC | Age: 63
DRG: 690 | End: 2023-09-11
Attending: STUDENT IN AN ORGANIZED HEALTH CARE EDUCATION/TRAINING PROGRAM
Payer: COMMERCIAL

## 2023-09-11 ENCOUNTER — APPOINTMENT (OUTPATIENT)
Dept: PHYSICAL THERAPY | Facility: CLINIC | Age: 63
DRG: 690 | End: 2023-09-11
Attending: STUDENT IN AN ORGANIZED HEALTH CARE EDUCATION/TRAINING PROGRAM
Payer: COMMERCIAL

## 2023-09-11 VITALS
HEIGHT: 70 IN | OXYGEN SATURATION: 99 % | DIASTOLIC BLOOD PRESSURE: 89 MMHG | HEART RATE: 71 BPM | WEIGHT: 165.12 LBS | SYSTOLIC BLOOD PRESSURE: 150 MMHG | TEMPERATURE: 97.9 F | BODY MASS INDEX: 23.64 KG/M2 | RESPIRATION RATE: 18 BRPM

## 2023-09-11 LAB
ANION GAP SERPL CALCULATED.3IONS-SCNC: 10 MMOL/L (ref 7–15)
BASOPHILS # BLD AUTO: 0.1 10E3/UL (ref 0–0.2)
BASOPHILS NFR BLD AUTO: 1 %
BUN SERPL-MCNC: 18.5 MG/DL (ref 8–23)
CALCIUM SERPL-MCNC: 9.1 MG/DL (ref 8.8–10.2)
CHLORIDE SERPL-SCNC: 102 MMOL/L (ref 98–107)
CREAT SERPL-MCNC: 0.54 MG/DL (ref 0.67–1.17)
DEPRECATED HCO3 PLAS-SCNC: 27 MMOL/L (ref 22–29)
EGFRCR SERPLBLD CKD-EPI 2021: >90 ML/MIN/1.73M2
EOSINOPHIL # BLD AUTO: 0.6 10E3/UL (ref 0–0.7)
EOSINOPHIL NFR BLD AUTO: 7 %
ERYTHROCYTE [DISTWIDTH] IN BLOOD BY AUTOMATED COUNT: 17.9 % (ref 10–15)
GLUCOSE SERPL-MCNC: 97 MG/DL (ref 70–99)
HCT VFR BLD AUTO: 30.4 % (ref 40–53)
HGB BLD-MCNC: 9.4 G/DL (ref 13.3–17.7)
IMM GRANULOCYTES # BLD: 0 10E3/UL
IMM GRANULOCYTES NFR BLD: 0 %
LYMPHOCYTES # BLD AUTO: 1 10E3/UL (ref 0.8–5.3)
LYMPHOCYTES NFR BLD AUTO: 12 %
MCH RBC QN AUTO: 24.5 PG (ref 26.5–33)
MCHC RBC AUTO-ENTMCNC: 30.9 G/DL (ref 31.5–36.5)
MCV RBC AUTO: 79 FL (ref 78–100)
MONOCYTES # BLD AUTO: 0.8 10E3/UL (ref 0–1.3)
MONOCYTES NFR BLD AUTO: 9 %
NEUTROPHILS # BLD AUTO: 6.1 10E3/UL (ref 1.6–8.3)
NEUTROPHILS NFR BLD AUTO: 71 %
NRBC # BLD AUTO: 0 10E3/UL
NRBC BLD AUTO-RTO: 0 /100
PLATELET # BLD AUTO: 398 10E3/UL (ref 150–450)
POTASSIUM SERPL-SCNC: 4 MMOL/L (ref 3.4–5.3)
RBC # BLD AUTO: 3.84 10E6/UL (ref 4.4–5.9)
SODIUM SERPL-SCNC: 139 MMOL/L (ref 136–145)
WBC # BLD AUTO: 8.7 10E3/UL (ref 4–11)

## 2023-09-11 PROCEDURE — 97535 SELF CARE MNGMENT TRAINING: CPT | Mod: GO

## 2023-09-11 PROCEDURE — 99239 HOSP IP/OBS DSCHRG MGMT >30: CPT | Performed by: HOSPITALIST

## 2023-09-11 PROCEDURE — 97165 OT EVAL LOW COMPLEX 30 MIN: CPT | Mod: GO

## 2023-09-11 PROCEDURE — 250N000011 HC RX IP 250 OP 636: Performed by: STUDENT IN AN ORGANIZED HEALTH CARE EDUCATION/TRAINING PROGRAM

## 2023-09-11 PROCEDURE — 80048 BASIC METABOLIC PNL TOTAL CA: CPT | Performed by: HOSPITALIST

## 2023-09-11 PROCEDURE — 250N000013 HC RX MED GY IP 250 OP 250 PS 637: Performed by: STUDENT IN AN ORGANIZED HEALTH CARE EDUCATION/TRAINING PROGRAM

## 2023-09-11 PROCEDURE — 36415 COLL VENOUS BLD VENIPUNCTURE: CPT | Performed by: HOSPITALIST

## 2023-09-11 PROCEDURE — G0463 HOSPITAL OUTPT CLINIC VISIT: HCPCS

## 2023-09-11 PROCEDURE — 250N000011 HC RX IP 250 OP 636

## 2023-09-11 PROCEDURE — 258N000003 HC RX IP 258 OP 636

## 2023-09-11 PROCEDURE — 250N000013 HC RX MED GY IP 250 OP 250 PS 637: Performed by: HOSPITALIST

## 2023-09-11 PROCEDURE — 85014 HEMATOCRIT: CPT | Performed by: HOSPITALIST

## 2023-09-11 PROCEDURE — 97162 PT EVAL MOD COMPLEX 30 MIN: CPT | Mod: GP | Performed by: PHYSICAL THERAPIST

## 2023-09-11 PROCEDURE — 97530 THERAPEUTIC ACTIVITIES: CPT | Mod: GP | Performed by: PHYSICAL THERAPIST

## 2023-09-11 RX ORDER — CYCLOBENZAPRINE HYDROCHLORIDE 7.5 MG/1
7.5 TABLET, FILM COATED ORAL 3 TIMES DAILY PRN
Qty: 40 TABLET | Refills: 0 | Status: ON HOLD | OUTPATIENT
Start: 2023-09-11 | End: 2024-04-08

## 2023-09-11 RX ORDER — SULFAMETHOXAZOLE/TRIMETHOPRIM 800-160 MG
1 TABLET ORAL 2 TIMES DAILY
Qty: 10 TABLET | Refills: 0 | Status: ON HOLD | OUTPATIENT
Start: 2023-09-11 | End: 2024-04-08

## 2023-09-11 RX ORDER — VANCOMYCIN HYDROCHLORIDE 125 MG/1
125 CAPSULE ORAL 2 TIMES DAILY
Qty: 24 CAPSULE | Refills: 0 | Status: ON HOLD | OUTPATIENT
Start: 2023-09-11 | End: 2024-04-08

## 2023-09-11 RX ORDER — SULFAMETHOXAZOLE/TRIMETHOPRIM 800-160 MG
1 TABLET ORAL 2 TIMES DAILY
Status: DISCONTINUED | OUTPATIENT
Start: 2023-09-11 | End: 2023-09-11 | Stop reason: HOSPADM

## 2023-09-11 RX ORDER — CARBOXYMETHYLCELLULOSE SODIUM 5 MG/ML
1 SOLUTION/ DROPS OPHTHALMIC
Status: DISCONTINUED | OUTPATIENT
Start: 2023-09-11 | End: 2023-09-11 | Stop reason: HOSPADM

## 2023-09-11 RX ADMIN — CARBOXYMETHYLCELLULOSE SODIUM 1 DROP: 5 SOLUTION/ DROPS OPHTHALMIC at 17:30

## 2023-09-11 RX ADMIN — CARBOXYMETHYLCELLULOSE SODIUM 1 DROP: 5 SOLUTION/ DROPS OPHTHALMIC at 08:17

## 2023-09-11 RX ADMIN — APIXABAN 5 MG: 5 TABLET, FILM COATED ORAL at 08:11

## 2023-09-11 RX ADMIN — VANCOMYCIN HYDROCHLORIDE 125 MG: 125 CAPSULE ORAL at 08:10

## 2023-09-11 RX ADMIN — OXYCODONE HYDROCHLORIDE 15 MG: 5 TABLET ORAL at 15:39

## 2023-09-11 RX ADMIN — VANCOMYCIN HYDROCHLORIDE 1500 MG: 10 INJECTION, POWDER, LYOPHILIZED, FOR SOLUTION INTRAVENOUS at 04:49

## 2023-09-11 RX ADMIN — CYCLOBENZAPRINE 7.5 MG: 5 TABLET, FILM COATED ORAL at 08:10

## 2023-09-11 RX ADMIN — ONDANSETRON 4 MG: 4 TABLET, ORALLY DISINTEGRATING ORAL at 08:09

## 2023-09-11 RX ADMIN — CYCLOBENZAPRINE 7.5 MG: 5 TABLET, FILM COATED ORAL at 15:39

## 2023-09-11 RX ADMIN — OXYBUTYNIN CHLORIDE 5 MG: 5 TABLET ORAL at 08:11

## 2023-09-11 RX ADMIN — SENNOSIDES 1 TABLET: 8.6 TABLET, FILM COATED ORAL at 08:10

## 2023-09-11 RX ADMIN — DULOXETINE HYDROCHLORIDE 60 MG: 60 CAPSULE, DELAYED RELEASE ORAL at 08:11

## 2023-09-11 RX ADMIN — Medication 1 MG: at 01:23

## 2023-09-11 RX ADMIN — SULFAMETHOXAZOLE AND TRIMETHOPRIM 1 TABLET: 800; 160 TABLET ORAL at 10:58

## 2023-09-11 RX ADMIN — CYCLOBENZAPRINE HYDROCHLORIDE 7.5 MG: 5 TABLET, FILM COATED ORAL at 01:23

## 2023-09-11 RX ADMIN — OXYCODONE HYDROCHLORIDE 15 MG: 5 TABLET ORAL at 08:10

## 2023-09-11 RX ADMIN — OXYCODONE HYDROCHLORIDE 5 MG: 5 TABLET ORAL at 12:42

## 2023-09-11 RX ADMIN — CARBOXYMETHYLCELLULOSE SODIUM 1 DROP: 5 SOLUTION/ DROPS OPHTHALMIC at 01:39

## 2023-09-11 RX ADMIN — PREGABALIN 100 MG: 100 CAPSULE ORAL at 08:11

## 2023-09-11 RX ADMIN — CARBOXYMETHYLCELLULOSE SODIUM 1 DROP: 5 SOLUTION/ DROPS OPHTHALMIC at 04:50

## 2023-09-11 RX ADMIN — OXYBUTYNIN CHLORIDE 5 MG: 5 TABLET ORAL at 15:39

## 2023-09-11 RX ADMIN — OXYCODONE HYDROCHLORIDE 5 MG: 5 TABLET ORAL at 01:23

## 2023-09-11 ASSESSMENT — ACTIVITIES OF DAILY LIVING (ADL)
ADLS_ACUITY_SCORE: 42
ADLS_ACUITY_SCORE: 49
WEAR_GLASSES_OR_BLIND: NO
ADLS_ACUITY_SCORE: 40
DEPENDENT_IADLS:: LAUNDRY
CONCENTRATING,_REMEMBERING_OR_MAKING_DECISIONS_DIFFICULTY: NO
TOILETING_ISSUES: YES
ADLS_ACUITY_SCORE: 39
ADLS_ACUITY_SCORE: 49
WALKING_OR_CLIMBING_STAIRS_DIFFICULTY: YES
DRESSING/BATHING: BATHING DIFFICULTY, REQUIRES EQUIPMENT
WALKING_OR_CLIMBING_STAIRS: TRANSFERRING DIFFICULTY, ASSISTANCE 1 PERSON
FALL_HISTORY_WITHIN_LAST_SIX_MONTHS: YES
ADLS_ACUITY_SCORE: 40
ADLS_ACUITY_SCORE: 42
DIFFICULTY_EATING/SWALLOWING: NO
DRESSING/BATHING_DIFFICULTY: YES
TOILETING_ASSISTANCE: TOILETING DIFFICULTY, REQUIRES EQUIPMENT;TOILETING DIFFICULTY, ASSISTANCE 1 PERSON
ADLS_ACUITY_SCORE: 49
ADLS_ACUITY_SCORE: 49
DOING_ERRANDS_INDEPENDENTLY_DIFFICULTY: NO

## 2023-09-11 NOTE — PROGRESS NOTES
MD Notification    Notified Person: MD    Notified Person Name: Dr. Dante Dickinson    Notification Date/Time:September 11, 2023 1:16 AM     Notification Interaction: Web paging    Purpose of Notification: Pt's eyes are irritated, requests eye drops    Orders Received: Eye drops ordered Q1hr PRN    Comments:
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
## 2023-09-11 NOTE — PLAN OF CARE
Goal Outcome Evaluation:      Plan of Care Reviewed With: patient    Summary: Acute encephalopathy unclear etiology -resolved, UTI  Possible syncope  Suspected failure to thrive at home  Possible unintentional drug overdose  DATE & TIME: 9/11/23, 6677-5310  Cognitive Concerns/ Orientation : A/O x4   BEHAVIOR & AGGRESSION TOOL COLOR: Green  CIWA SCORE: NA   ABNL VS/O2: VSS on RA   MOBILITY: Paraplegic, T/R at night, can turn self when awake.   PAIN MANAGMENT: reports Back, leg, buttocks, hip pain managed with scheduled Oxycodone and PRN Flexiril x1  DIET: Regular, tolerating  BOWEL/BLADDER: Has LUQ colostomy (one piece bag)- 0ml output, hx of neurogenic bladder (patient straight cath himself at home, needs assistance at the hospital), bladder scan done= 899mL, straight cath x 1 - 600 mL, refused PVR check. (Orders to straight cath if bladder scan > 400mL)  ABNL LAB/BG: No new labs today. +UA, UC, BC in process  DRAIN/DEVICES: LUQ colostomy bag, R AC IV/SL   TELEMETRY RHYTHM: NA  SKIN: Existing open areas on buttock/sacrum, blanchable redness on heels, buttocks, reddened area on left hip from old healed wound, dry/peeling skin on feet. Mapilex in place to buttocks, hip and heels. + 1-2 edema to LE bilaterally   TESTS/PROCEDURES: CT and ECHO completed.    D/C DAY/GOALS/PLACE: pending  OTHER IMPORTANT INFO: Neurology consulted. PT/OT/WOC, SW following.
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
## 2023-09-11 NOTE — PROGRESS NOTES
.Discharge    Patient discharged to home via [facility] Wheelchair transport with transporter  Care plan note     Listed belongings gathered and given to patient (including from security/pharmacy). Yes  Care Plan and Patient education resolved: Yes  Prescriptions if needed, hard copies sent with patient  NA  Medication Bin checked and emptied on discharge Yes  SW/care coordinator/charge RN aware of discharge: Yes

## 2023-09-11 NOTE — PLAN OF CARE
Summary: Acute encephalopathy unclear etiology -resolved, UTI  Possible syncope  Suspected failure to thrive at home  Possible unintentional drug overdose  DATE & TIME: 9/10/23, 4915-1597  Cognitive Concerns/ Orientation : A/O x4   BEHAVIOR & AGGRESSION TOOL COLOR: Green  CIWA SCORE: NA   ABNL VS/O2: VSS on RA   MOBILITY: Paraplegic, T/R at night, can turn self when awake.   PAIN MANAGMENT: reports Back, leg, buttocks, hip pain managed with scheduled Oxycodone x2 and PRN Flexiril x1  DIET: Regular, tolerating  BOWEL/BLADDER: Has LUQ colostomy (one piece bag changed in AM)- 0ml output, hx of neurogenic bladder (patient straight cath himself at home), bladder scan done and straight cath on this shift x 1 (clear andrew) straight cath 600 mL  ABNL LAB/BG: hgb=9.1, creatinine= 0.55, +UA, UC, BC in process  DRAIN/DEVICES: LUQ colostomy bag, R AC IV/SL   TELEMETRY RHYTHM: NA  SKIN: Existing open areas on buttock/sacrum, blanchable redness on heels, buttocks, reddened area on left hip from old healed wound, dry/peeling skin on feet. Mapilex in place to buttocks, hip and heels. + 1-2 edema to LE bilaterally   TESTS/PROCEDURES: CT and ECHO completed.    D/C DAY/GOALS/PLACE: pending  OTHER IMPORTANT INFO: Neurology consulted. PT/OT/WOC ,SW following

## 2023-09-11 NOTE — PLAN OF CARE
Summary: Acute encephalopathy unclear etiology -resolved, UTI. Suspected failure to thrive at home  Possible unintentional drug overdose  DATE & TIME: 9/11/23, 9984-4330  Cognitive Concerns/ Orientation : A & O x 4   BEHAVIOR & AGGRESSION TOOL COLOR: Green  ABNL VS/O2: VSS on RA except mild HTN  MOBILITY: Paraplegic, T/R, can turn self when awake. Refused turning.   PAIN MANAGMENT: Reports Back, leg, buttocks, hip pain managed with scheduled Oxycodone and PRN Flexiril x1 and PRN Oxycodone x1.   DIET: Regular, tolerating  BOWEL/BLADDER: Has LUQ colostomy (one piece bag)- 0ml output, hx of neurogenic bladder (patient straight cath himself at home, needs assistance at the hospital), straight cath x1 for 725.   ABNL LAB/BG: Cr 0.54 and Hgb 9.4  DRAIN/DEVICES: LUQ colostomy bag, 2 PIV SL  TELEMETRY RHYTHM: NA  SKIN: Existing open areas on buttock/sacrum, blanchable redness on heels, buttocks, reddened area on left hip from old healed wound, dry/peeling skin on feet. Mepilex in place to buttocks, hip and heels. + 1-2 edema to LE bilaterally   TESTS/PROCEDURES: None  D/C DAY/GOALS/PLACE: This evening between 5:25 pm - 6:10 pm.   OTHER IMPORTANT INFO: Contact precautions maintained. Switched to oral abx. Feels ready to discharge home. Worked with PT/OT.
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
## 2023-09-11 NOTE — PLAN OF CARE
Physical Therapy Discharge Summary    Reason for therapy discharge:    Discharged to home with home therapy.    Progress towards therapy goal(s). See goals on Care Plan in [facility] electronic health record for goal details.  Goals not met.  Barriers to achieving goals:   discharge on same date as initial evaluation.    Therapy recommendation(s):    Continued therapy is recommended.  Rationale/Recommendations:  Patient would benefit from HHPT to address safety and independence with sliding board transfers.    Goal Outcome Evaluation:
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
## 2023-09-11 NOTE — CONSULTS
Care Management Initial Consult    General Information  Assessment completed with: Patient,    Type of CM/SW Visit: Initial Assessment    Primary Care Provider verified and updated as needed: Yes (Dr. Dee)   Readmission within the last 30 days: no previous admission in last 30 days      Reason for Consult: discharge planning  Advance Care Planning:    he has no HCD but is open to a blank one to complete- will provide       Communication Assessment  Patient's communication style: spoken language (English or Bilingual)    Hearing Difficulty or Deaf: no   Wear Glasses or Blind: no    Cognitive  Cognitive/Neuro/Behavioral: .WDL except  Level of Consciousness: alert  Arousal Level: opens eyes spontaneously  Orientation: oriented x 4  Mood/Behavior: cooperative  Best Language: 0 - No aphasia  Speech: hyperverbal/rapid, rambling    Living Environment:   People in home: friend(s)  Friend Flores lives in the basement  Current living Arrangements: house (has a ramp to enter home;)      Able to return to prior arrangements: yes       Family/Social Support:  Care provided by: self  Provides care for: no one  Marital Status: Single  Other (specify) (3 friends;  Friend Flores lives in basement; friend Vasile lives a couple blocks away and is available to him for assistance if he needs)          Description of Support System: Supportive, Involved         Current Resources:   Patient receiving home care services: No     Community Resources: None  Equipment currently used at home: shower chair, grab bar, tub/shower  Supplies currently used at home:      Employment/Financial:  Employment Status:          Financial Concerns: No concerns identified           Does the patient's insurance plan have a 3 day qualifying hospital stay waiver?  No    Lifestyle & Psychosocial Needs:  Social Determinants of Health     Tobacco Use: Low Risk  (8/12/2020)    Patient History     Smoking Tobacco Use: Never     Smokeless Tobacco Use: Never      "Passive Exposure: Not on file   Alcohol Use: Not on file   Financial Resource Strain: Not on file   Food Insecurity: Not on file   Transportation Needs: Not on file   Physical Activity: Not on file   Stress: Not on file   Social Connections: Not on file   Intimate Partner Violence: Not on file   Depression: Not on file   Housing Stability: Not on file       Functional Status:  Prior to admission patient needed assistance:   Dependent ADLs:: Wheelchair-independent, Bathing, Dressing, Eating, Grooming, Transfers, Positioning, Toileting (straight cath's himself and has a new colostomy)  Dependent IADLs:: Laundry (states friend Flores does his laundry)       Mental Health Status:          Chemical Dependency Status:                Values/Beliefs:  Spiritual, Cultural Beliefs, Episcopal Practices, Values that affect care: no               Additional Information:  Met with patient, explained role in discharge planning.  Patient lives in a house with a friend, Flores living in the basement.  He is independent in transferring to his wheelchair, bathing, cleaning, cooking.  His friend Flores assists him in the laundry which is in the basement - he \"throws\" it down to her.  He utilizes private pay transportation when his friends cannot transport him to appointments.  He has no current home care nor resources that he partakes in.  He has a ramp for entry into his home.  He has a new colostomy which the [facility] nurse has consulted on this afternoon.  He straight caths himself and states he is doing fine is this.  He states his friend Vasile lives a couple blocks away and is usually home and can assistance him if he needs him.  He requests transportation to be arranged home; his wheelchair is at home; he would have Vasile there to accept him home.  He is accepting to have home care with PT/OT and RN.  Patient wanted to begin a health care directive.  A blank form was given to the patient and advised to complete and have notarize when " able.    Will continue to follow for discharge planning.      Elsa Alaniz RN  Inpatient Float Care Coordinator
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
## 2023-09-14 LAB
BACTERIA BLD CULT: NO GROWTH
BACTERIA BLD CULT: NO GROWTH

## 2024-03-28 ENCOUNTER — MEDICAL CORRESPONDENCE (OUTPATIENT)
Dept: HEALTH INFORMATION MANAGEMENT | Facility: CLINIC | Age: 64
End: 2024-03-28
Payer: COMMERCIAL

## 2024-04-03 ENCOUNTER — HOSPITAL ENCOUNTER (INPATIENT)
Facility: CLINIC | Age: 64
LOS: 5 days | Discharge: HOME HEALTH | DRG: 917 | End: 2024-04-08
Attending: EMERGENCY MEDICINE | Admitting: HOSPITALIST
Payer: COMMERCIAL

## 2024-04-03 ENCOUNTER — APPOINTMENT (OUTPATIENT)
Dept: CT IMAGING | Facility: CLINIC | Age: 64
DRG: 917 | End: 2024-04-03
Attending: EMERGENCY MEDICINE
Payer: COMMERCIAL

## 2024-04-03 DIAGNOSIS — G93.40 ACUTE ENCEPHALOPATHY: ICD-10-CM

## 2024-04-03 DIAGNOSIS — Z86.19 HISTORY OF CLOSTRIDIOIDES DIFFICILE COLITIS: ICD-10-CM

## 2024-04-03 DIAGNOSIS — L03.039 CELLULITIS OF TOE, UNSPECIFIED LATERALITY: ICD-10-CM

## 2024-04-03 DIAGNOSIS — G89.29 CHRONIC BUTTOCK PAIN: Primary | ICD-10-CM

## 2024-04-03 DIAGNOSIS — R62.7 FAILURE TO THRIVE IN ADULT: ICD-10-CM

## 2024-04-03 DIAGNOSIS — M79.18 CHRONIC BUTTOCK PAIN: Primary | ICD-10-CM

## 2024-04-03 LAB
ALBUMIN SERPL BCG-MCNC: 4.2 G/DL (ref 3.5–5.2)
ALBUMIN UR-MCNC: NEGATIVE MG/DL
ALP SERPL-CCNC: 106 U/L (ref 40–150)
ALT SERPL W P-5'-P-CCNC: 15 U/L (ref 0–70)
AMPHETAMINES UR QL SCN: ABNORMAL
ANION GAP SERPL CALCULATED.3IONS-SCNC: 16 MMOL/L (ref 7–15)
APPEARANCE UR: CLEAR
AST SERPL W P-5'-P-CCNC: 18 U/L (ref 0–45)
ATRIAL RATE - MUSE: 56 BPM
BACTERIA #/AREA URNS HPF: ABNORMAL /HPF
BARBITURATES UR QL SCN: ABNORMAL
BASE EXCESS BLDV CALC-SCNC: -1 MMOL/L (ref -3–3)
BASE EXCESS BLDV CALC-SCNC: -10.2 MMOL/L (ref -3–3)
BASE EXCESS BLDV CALC-SCNC: 1 MMOL/L (ref -3–3)
BASOPHILS # BLD AUTO: 0.1 10E3/UL (ref 0–0.2)
BASOPHILS NFR BLD AUTO: 1 %
BENZODIAZ UR QL SCN: ABNORMAL
BILIRUB SERPL-MCNC: <0.2 MG/DL
BILIRUB UR QL STRIP: NEGATIVE
BUN SERPL-MCNC: 33.2 MG/DL (ref 8–23)
BZE UR QL SCN: ABNORMAL
CALCIUM SERPL-MCNC: 9.4 MG/DL (ref 8.8–10.2)
CANNABINOIDS UR QL SCN: ABNORMAL
CHLORIDE SERPL-SCNC: 100 MMOL/L (ref 98–107)
CK SERPL-CCNC: 98 U/L (ref 39–308)
COLOR UR AUTO: ABNORMAL
CREAT SERPL-MCNC: 0.72 MG/DL (ref 0.67–1.17)
DEPRECATED HCO3 PLAS-SCNC: 23 MMOL/L (ref 22–29)
DIASTOLIC BLOOD PRESSURE - MUSE: NORMAL MMHG
EGFRCR SERPLBLD CKD-EPI 2021: >90 ML/MIN/1.73M2
EOSINOPHIL # BLD AUTO: 0.6 10E3/UL (ref 0–0.7)
EOSINOPHIL NFR BLD AUTO: 6 %
ERYTHROCYTE [DISTWIDTH] IN BLOOD BY AUTOMATED COUNT: 17.9 % (ref 10–15)
FENTANYL UR QL: ABNORMAL
GLUCOSE SERPL-MCNC: 102 MG/DL (ref 70–99)
GLUCOSE UR STRIP-MCNC: NEGATIVE MG/DL
HCO3 BLDV-SCNC: 17 MMOL/L (ref 21–28)
HCO3 BLDV-SCNC: 28 MMOL/L (ref 21–28)
HCO3 BLDV-SCNC: 29 MMOL/L (ref 21–28)
HCT VFR BLD AUTO: 37.3 % (ref 40–53)
HGB BLD-MCNC: 11.5 G/DL (ref 13.3–17.7)
HGB UR QL STRIP: NEGATIVE
IMM GRANULOCYTES # BLD: 0 10E3/UL
IMM GRANULOCYTES NFR BLD: 0 %
INTERPRETATION ECG - MUSE: NORMAL
KETONES UR STRIP-MCNC: NEGATIVE MG/DL
LACTATE BLD-SCNC: 1.3 MMOL/L
LACTATE BLD-SCNC: 1.6 MMOL/L
LEUKOCYTE ESTERASE UR QL STRIP: NEGATIVE
LYMPHOCYTES # BLD AUTO: 1.9 10E3/UL (ref 0.8–5.3)
LYMPHOCYTES NFR BLD AUTO: 17 %
MCH RBC QN AUTO: 24.2 PG (ref 26.5–33)
MCHC RBC AUTO-ENTMCNC: 30.8 G/DL (ref 31.5–36.5)
MCV RBC AUTO: 78 FL (ref 78–100)
MONOCYTES # BLD AUTO: 0.7 10E3/UL (ref 0–1.3)
MONOCYTES NFR BLD AUTO: 7 %
MUCOUS THREADS #/AREA URNS LPF: PRESENT /LPF
NEUTROPHILS # BLD AUTO: 7.8 10E3/UL (ref 1.6–8.3)
NEUTROPHILS NFR BLD AUTO: 69 %
NITRATE UR QL: NEGATIVE
NRBC # BLD AUTO: 0 10E3/UL
NRBC BLD AUTO-RTO: 0 /100
O2/TOTAL GAS SETTING VFR VENT: 21 %
OPIATES UR QL SCN: ABNORMAL
OXYHGB MFR BLDV: 25 % (ref 70–75)
P AXIS - MUSE: 64 DEGREES
PCO2 BLDV: 41 MM HG (ref 40–50)
PCO2 BLDV: 63 MM HG (ref 40–50)
PCO2 BLDV: 65 MM HG (ref 40–50)
PCP QUAL URINE (ROCHE): ABNORMAL
PH BLDV: 7.22 [PH] (ref 7.32–7.43)
PH BLDV: 7.24 [PH] (ref 7.32–7.43)
PH BLDV: 7.27 [PH] (ref 7.32–7.43)
PH UR STRIP: 6.5 [PH] (ref 5–7)
PLATELET # BLD AUTO: 412 10E3/UL (ref 150–450)
PO2 BLDV: 15 MM HG (ref 25–47)
PO2 BLDV: 19 MM HG (ref 25–47)
PO2 BLDV: 23 MM HG (ref 25–47)
POTASSIUM SERPL-SCNC: 4.2 MMOL/L (ref 3.4–5.3)
PR INTERVAL - MUSE: 146 MS
PROT SERPL-MCNC: 7.9 G/DL (ref 6.4–8.3)
QRS DURATION - MUSE: 78 MS
QT - MUSE: 424 MS
QTC - MUSE: 409 MS
R AXIS - MUSE: 23 DEGREES
RBC # BLD AUTO: 4.76 10E6/UL (ref 4.4–5.9)
RBC URINE: 5 /HPF
SAO2 % BLDV: 14 % (ref 70–75)
SAO2 % BLDV: 22 % (ref 70–75)
SAO2 % BLDV: 24.8 % (ref 70–75)
SODIUM SERPL-SCNC: 139 MMOL/L (ref 135–145)
SP GR UR STRIP: 1.02 (ref 1–1.03)
SYSTOLIC BLOOD PRESSURE - MUSE: NORMAL MMHG
T AXIS - MUSE: 65 DEGREES
TROPONIN T SERPL HS-MCNC: 30 NG/L
UROBILINOGEN UR STRIP-MCNC: NORMAL MG/DL
VENTRICULAR RATE- MUSE: 56 BPM
WBC # BLD AUTO: 11.1 10E3/UL (ref 4–11)
WBC URINE: 2 /HPF

## 2024-04-03 PROCEDURE — 96365 THER/PROPH/DIAG IV INF INIT: CPT

## 2024-04-03 PROCEDURE — 5A09357 ASSISTANCE WITH RESPIRATORY VENTILATION, LESS THAN 24 CONSECUTIVE HOURS, CONTINUOUS POSITIVE AIRWAY PRESSURE: ICD-10-PCS | Performed by: EMERGENCY MEDICINE

## 2024-04-03 PROCEDURE — 250N000011 HC RX IP 250 OP 636: Mod: JZ | Performed by: EMERGENCY MEDICINE

## 2024-04-03 PROCEDURE — 93005 ELECTROCARDIOGRAM TRACING: CPT

## 2024-04-03 PROCEDURE — 96375 TX/PRO/DX INJ NEW DRUG ADDON: CPT

## 2024-04-03 PROCEDURE — 96361 HYDRATE IV INFUSION ADD-ON: CPT

## 2024-04-03 PROCEDURE — 82550 ASSAY OF CK (CPK): CPT | Performed by: HOSPITALIST

## 2024-04-03 PROCEDURE — 51798 US URINE CAPACITY MEASURE: CPT

## 2024-04-03 PROCEDURE — 80053 COMPREHEN METABOLIC PANEL: CPT | Performed by: EMERGENCY MEDICINE

## 2024-04-03 PROCEDURE — 36415 COLL VENOUS BLD VENIPUNCTURE: CPT | Performed by: HOSPITALIST

## 2024-04-03 PROCEDURE — 99418 PROLNG IP/OBS E/M EA 15 MIN: CPT | Performed by: HOSPITALIST

## 2024-04-03 PROCEDURE — 70450 CT HEAD/BRAIN W/O DYE: CPT

## 2024-04-03 PROCEDURE — 999N000157 HC STATISTIC RCP TIME EA 10 MIN

## 2024-04-03 PROCEDURE — 99285 EMERGENCY DEPT VISIT HI MDM: CPT | Mod: 25

## 2024-04-03 PROCEDURE — 81003 URINALYSIS AUTO W/O SCOPE: CPT | Performed by: EMERGENCY MEDICINE

## 2024-04-03 PROCEDURE — 85004 AUTOMATED DIFF WBC COUNT: CPT | Performed by: EMERGENCY MEDICINE

## 2024-04-03 PROCEDURE — 82805 BLOOD GASES W/O2 SATURATION: CPT | Performed by: HOSPITALIST

## 2024-04-03 PROCEDURE — 36415 COLL VENOUS BLD VENIPUNCTURE: CPT | Performed by: EMERGENCY MEDICINE

## 2024-04-03 PROCEDURE — 94660 CPAP INITIATION&MGMT: CPT

## 2024-04-03 PROCEDURE — 120N000001 HC R&B MED SURG/OB

## 2024-04-03 PROCEDURE — 99223 1ST HOSP IP/OBS HIGH 75: CPT | Performed by: HOSPITALIST

## 2024-04-03 PROCEDURE — 96367 TX/PROPH/DG ADDL SEQ IV INF: CPT

## 2024-04-03 PROCEDURE — 258N000003 HC RX IP 258 OP 636: Performed by: EMERGENCY MEDICINE

## 2024-04-03 PROCEDURE — 84484 ASSAY OF TROPONIN QUANT: CPT | Performed by: EMERGENCY MEDICINE

## 2024-04-03 PROCEDURE — 258N000003 HC RX IP 258 OP 636: Performed by: HOSPITALIST

## 2024-04-03 PROCEDURE — 87086 URINE CULTURE/COLONY COUNT: CPT | Performed by: EMERGENCY MEDICINE

## 2024-04-03 PROCEDURE — 82803 BLOOD GASES ANY COMBINATION: CPT

## 2024-04-03 PROCEDURE — 80307 DRUG TEST PRSMV CHEM ANLYZR: CPT | Performed by: EMERGENCY MEDICINE

## 2024-04-03 PROCEDURE — 87040 BLOOD CULTURE FOR BACTERIA: CPT | Performed by: EMERGENCY MEDICINE

## 2024-04-03 RX ORDER — HYDROXYZINE HYDROCHLORIDE 25 MG/1
25 TABLET, FILM COATED ORAL EVERY 8 HOURS PRN
COMMUNITY

## 2024-04-03 RX ORDER — ONDANSETRON 2 MG/ML
4 INJECTION INTRAMUSCULAR; INTRAVENOUS EVERY 6 HOURS PRN
Status: DISCONTINUED | OUTPATIENT
Start: 2024-04-03 | End: 2024-04-08 | Stop reason: HOSPADM

## 2024-04-03 RX ORDER — ACETAMINOPHEN 325 MG/1
650 TABLET ORAL EVERY 4 HOURS PRN
Status: DISCONTINUED | OUTPATIENT
Start: 2024-04-03 | End: 2024-04-08 | Stop reason: HOSPADM

## 2024-04-03 RX ORDER — ONDANSETRON 2 MG/ML
INJECTION INTRAMUSCULAR; INTRAVENOUS
Status: DISCONTINUED
Start: 2024-04-03 | End: 2024-04-03 | Stop reason: HOSPADM

## 2024-04-03 RX ORDER — CALCIUM CARBONATE 500 MG/1
1000 TABLET, CHEWABLE ORAL 4 TIMES DAILY PRN
Status: DISCONTINUED | OUTPATIENT
Start: 2024-04-03 | End: 2024-04-08 | Stop reason: HOSPADM

## 2024-04-03 RX ORDER — BACLOFEN 10 MG/1
5 TABLET ORAL 2 TIMES DAILY
COMMUNITY

## 2024-04-03 RX ORDER — BACLOFEN 20 MG/1
TABLET ORAL
Status: ON HOLD | COMMUNITY
End: 2024-04-08

## 2024-04-03 RX ORDER — NALOXONE HYDROCHLORIDE 0.4 MG/ML
0.4 INJECTION, SOLUTION INTRAMUSCULAR; INTRAVENOUS; SUBCUTANEOUS ONCE
Status: COMPLETED | OUTPATIENT
Start: 2024-04-03 | End: 2024-04-03

## 2024-04-03 RX ORDER — HYDROCODONE BITARTRATE AND ACETAMINOPHEN 10; 325 MG/1; MG/1
1 TABLET ORAL EVERY 4 HOURS PRN
Status: ON HOLD | COMMUNITY
End: 2024-04-08

## 2024-04-03 RX ORDER — GABAPENTIN 100 MG/1
CAPSULE ORAL
Status: ON HOLD | COMMUNITY
End: 2024-04-08

## 2024-04-03 RX ORDER — AMOXICILLIN 250 MG
1 CAPSULE ORAL 2 TIMES DAILY PRN
Status: DISCONTINUED | OUTPATIENT
Start: 2024-04-03 | End: 2024-04-08 | Stop reason: HOSPADM

## 2024-04-03 RX ORDER — AMOXICILLIN 250 MG
2 CAPSULE ORAL 2 TIMES DAILY PRN
Status: DISCONTINUED | OUTPATIENT
Start: 2024-04-03 | End: 2024-04-08 | Stop reason: HOSPADM

## 2024-04-03 RX ORDER — PIPERACILLIN SODIUM, TAZOBACTAM SODIUM 4; .5 G/20ML; G/20ML
4.5 INJECTION, POWDER, LYOPHILIZED, FOR SOLUTION INTRAVENOUS ONCE
Qty: 20 ML | Refills: 0 | Status: COMPLETED | OUTPATIENT
Start: 2024-04-03 | End: 2024-04-03

## 2024-04-03 RX ORDER — OXYCODONE HYDROCHLORIDE 5 MG/1
5 TABLET ORAL EVERY 4 HOURS PRN
Status: DISCONTINUED | OUTPATIENT
Start: 2024-04-03 | End: 2024-04-05

## 2024-04-03 RX ORDER — CARBOXYMETHYLCELLULOSE SODIUM 5 MG/ML
1 SOLUTION/ DROPS OPHTHALMIC
Status: DISCONTINUED | OUTPATIENT
Start: 2024-04-03 | End: 2024-04-03

## 2024-04-03 RX ORDER — ONDANSETRON 4 MG/1
4 TABLET, ORALLY DISINTEGRATING ORAL EVERY 6 HOURS PRN
Status: DISCONTINUED | OUTPATIENT
Start: 2024-04-03 | End: 2024-04-08 | Stop reason: HOSPADM

## 2024-04-03 RX ORDER — ACETAMINOPHEN 650 MG/1
650 SUPPOSITORY RECTAL EVERY 4 HOURS PRN
Status: DISCONTINUED | OUTPATIENT
Start: 2024-04-03 | End: 2024-04-08 | Stop reason: HOSPADM

## 2024-04-03 RX ORDER — SODIUM CHLORIDE 9 MG/ML
INJECTION, SOLUTION INTRAVENOUS CONTINUOUS
Status: ACTIVE | OUTPATIENT
Start: 2024-04-03 | End: 2024-04-04

## 2024-04-03 RX ORDER — LIDOCAINE 40 MG/G
CREAM TOPICAL
Status: DISCONTINUED | OUTPATIENT
Start: 2024-04-03 | End: 2024-04-08 | Stop reason: HOSPADM

## 2024-04-03 RX ADMIN — VANCOMYCIN HYDROCHLORIDE 2000 MG: 10 INJECTION, POWDER, LYOPHILIZED, FOR SOLUTION INTRAVENOUS at 17:45

## 2024-04-03 RX ADMIN — PIPERACILLIN AND TAZOBACTAM 4.5 G: 4; .5 INJECTION, POWDER, FOR SOLUTION INTRAVENOUS at 17:15

## 2024-04-03 RX ADMIN — SODIUM CHLORIDE 1000 ML: 9 INJECTION, SOLUTION INTRAVENOUS at 15:22

## 2024-04-03 RX ADMIN — SODIUM CHLORIDE: 9 INJECTION, SOLUTION INTRAVENOUS at 23:49

## 2024-04-03 RX ADMIN — NALOXONE HYDROCHLORIDE 0.4 MG: 0.4 INJECTION, SOLUTION INTRAMUSCULAR; INTRAVENOUS; SUBCUTANEOUS at 18:21

## 2024-04-03 ASSESSMENT — ACTIVITIES OF DAILY LIVING (ADL)
ADLS_ACUITY_SCORE: 39

## 2024-04-03 NOTE — ED PROVIDER NOTES
History     Chief Complaint:  Drug Overdose       HPI   Jose Lopez is a 63 year old male who presents for altered mental status.  He arrives via EMS who gives the story as he is unable to provide any history.  EMS reports that his home physical therapist came to check on him and found him slumped over forward unresponsive.  He was surrounded by what appeared to be potential medications and drug paraphernalia per EMS.  No other family members acquaintances around to provide any history.  EMS reports knowing the patient and that he is paraplegic and have seen him in this condition previously.      Independent Historian:   EMS - They report as above    Review of External Notes:         Medications:    Eliquis  Flexmid  Cymbalta  Folvite  Narcan  Ditropan  Lyrica  Bactrim  Vancocin    Past Medical History:    Benzodiazepine dependence  Muscle spasticity  Neurogenic bladder  Neuroplastic bowel  Paraplegic  Sepsis  Spinal cord injury at T9 level   Substance abuse  UTI    Past Surgical History:    Cholecystectomy  Fistulotomy rectum  Orthopedic surgery  Back surgery     Physical Exam   Patient Vitals for the past 24 hrs:   BP Temp Temp src Pulse Resp SpO2   04/03/24 1647 -- -- -- (!) 46 13 100 %   04/03/24 1632 (!) 148/75 -- -- -- -- --   04/03/24 1611 -- -- -- (!) 42 13 --   04/03/24 1600 133/73 -- -- (!) 46 (!) 8 100 %   04/03/24 1555 -- -- -- (!) 48 (!) 7 100 %   04/03/24 1540 -- -- -- (!) 41 23 100 %   04/03/24 1525 -- -- -- 52 15 100 %   04/03/24 1515 -- -- -- 54 12 100 %   04/03/24 1510 -- -- -- 54 16 99 %   04/03/24 1500 -- -- -- -- -- 99 %   04/03/24 1449 128/77 (!) 96.7  F (35.9  C) Temporal 54 12 99 %        Physical Exam  Constitutional: Nontoxic appearing.  Moaning.  HEENT: Atraumatic.  PERRL.   Moist mucous membranes.  Neck: Soft.  Supple.    Cardiac: Regular rate and rhythm.  No murmur or rub.  Respiratory: Clear to auscultation bilaterally.  No respiratory distress.  No wheezing, rhonchi, or  rales.  Abdomen: Soft and nontender.  No guarding.  Colostomy present.  Nondistended.  Musculoskeletal: No edema.  Normal range of motion.  He has multiple abrasions on the bilateral dorsal toes with surrounding erythema and swelling of the toes.  Neurologic: Alert and obtunded.  Starts moaning and moving bilateral upper extremities secondary to vigorous sternal rub.    Skin: No rashes.  No edema.  Psych: Obtunded        Emergency Department Course     ECG results from 04/03/24   EKG 12-lead, tracing only     Value    Systolic Blood Pressure     Diastolic Blood Pressure     Ventricular Rate 56    Atrial Rate 56    FL Interval 146    QRS Duration 78        QTc 409    P Axis 64    R AXIS 23    T Axis 65    Interpretation ECG      Sinus bradycardia with sinus arrhythmia  Otherwise normal ECG  When compared with ECG of 08-SEP-2023 19:45,  No significant change was found           Imaging:  CT Head w/o Contrast   Final Result   IMPRESSION:   1.  No acute intracranial process.         Laboratory:  Labs Ordered and Resulted from Time of ED Arrival to Time of ED Departure   COMPREHENSIVE METABOLIC PANEL - Abnormal       Result Value    Sodium 139      Potassium 4.2      Carbon Dioxide (CO2) 23      Anion Gap 16 (*)     Urea Nitrogen 33.2 (*)     Creatinine 0.72      GFR Estimate >90      Calcium 9.4      Chloride 100      Glucose 102 (*)     Alkaline Phosphatase 106      AST 18      ALT 15      Protein Total 7.9      Albumin 4.2      Bilirubin Total <0.2     TROPONIN T, HIGH SENSITIVITY - Abnormal    Troponin T, High Sensitivity 30 (*)    ROUTINE UA WITH MICROSCOPIC REFLEX TO CULTURE - Abnormal    Color Urine Light Yellow      Appearance Urine Clear      Glucose Urine Negative      Bilirubin Urine Negative      Ketones Urine Negative      Specific Gravity Urine 1.025      Blood Urine Negative      pH Urine 6.5      Protein Albumin Urine Negative      Urobilinogen Urine Normal      Nitrite Urine Negative      Leukocyte  Esterase Urine Negative      Bacteria Urine Moderate (*)     Mucus Urine Present (*)     RBC Urine 5 (*)     WBC Urine 2     CBC WITH PLATELETS AND DIFFERENTIAL - Abnormal    WBC Count 11.1 (*)     RBC Count 4.76      Hemoglobin 11.5 (*)     Hematocrit 37.3 (*)     MCV 78      MCH 24.2 (*)     MCHC 30.8 (*)     RDW 17.9 (*)     Platelet Count 412      % Neutrophils 69      % Lymphocytes 17      % Monocytes 7      % Eosinophils 6      % Basophils 1      % Immature Granulocytes 0      NRBCs per 100 WBC 0      Absolute Neutrophils 7.8      Absolute Lymphocytes 1.9      Absolute Monocytes 0.7      Absolute Eosinophils 0.6      Absolute Basophils 0.1      Absolute Immature Granulocytes 0.0      Absolute NRBCs 0.0     ISTAT GASES LACTATE VENOUS POCT - Abnormal    Lactic Acid POCT 1.3      Bicarbonate Venous POCT 29 (*)     O2 Sat, Venous POCT 22 (*)     pCO2 Venous POCT 63 (*)     pH Venous POCT 7.27 (*)     pO2 Venous POCT 19 (*)     Base Excess/Deficit (+/-) POCT 1.0     BLOOD CULTURE   BLOOD CULTURE      Procedures     Emergency Department Course & Assessments:    Interventions:  Medications   piperacillin-tazobactam (ZOSYN) 4.5 g vial to attach to  mL bag (has no administration in time range)   vancomycin (VANCOCIN) 1,750 mg in 0.9% NaCl 500 mL intermittent infusion (has no administration in time range)   sodium chloride 0.9% BOLUS 1,000 mL (1,000 mLs Intravenous $New Bag 4/3/24 1522)        Assessments:  1443 I obtained history and examined the patient as noted above.      Independent Interpretation (X-rays, CTs, rhythm strip):  None    Consultations/Discussion of Management or Tests:  None        Social Determinants of Health affecting care:   None    Disposition:  The patient was admitted to the hospital under the care of Dr. Chacko.     Impression & Plan      Medical Decision Making:  Jose Lopez is a 63-year-old male who presents with encephalopathy.  I reviewed his history and he has multiple admissions  for sepsis as well as significant opioid use and potential polysubstance.  Unclear etiology at this time.  Again vancomycin and Zosyn for his toes that appear to be potentially infected.  Urine is not overwhelmingly obviously infected but given his history of recent sepsis secondary to UTI and is self cathing, this could represent early infection.  He was given Narcan with no real significant of his mental status.  His pCO2 is mildly elevated and he was placed on BiPAP per hospitalist.  CT head without acute abnormalities.  He has no fever to suggest meningitis/encephalitis.  Given his previous presentations, this could represent polysubstance as well.  He had no improvement in his mental status and require admission.  Discussed with hospital service who accepts for admission and he is in stable condition at time of admission.      Diagnosis:    ICD-10-CM    1. Acute encephalopathy  G93.40       2. Cellulitis of toe, unspecified laterality  L03.039            Scribe Disclosure:  I, Joceline Sarmiento, am serving as a scribe at 2:45 PM on 4/3/2024 to document services personally performed by Landry Mcrae MD based on my observations and the provider's statements to me.     4/3/2024   Landry Mcrae MD Salay, Nicholas J, MD  04/03/24 1695

## 2024-04-03 NOTE — ED TRIAGE NOTES
Pt BIB EMS from home, where occupational therapist called 911 at 1320 due to pt being slumped over in WC (paraplegic), powder/pills noted by patient. Upon EMS assessment, pupils are fixed, RR 12, SpO2 87% on RA. End tidal 30-35 mmHg.     Pt has an ostomy.

## 2024-04-03 NOTE — ED NOTES
[facility]  ED Nurse Handoff Report    ED Chief complaint: Drug Overdose      ED Diagnosis:   Final diagnoses:   None       Code Status: See H&P    Allergies:   Allergies   Allergen Reactions    Sertraline Other (See Comments)     Other reaction(s): Gastrointestinal  Other reaction(s): Gastrointestinal       Patient Story: Patient lives at home. OT went to see patient today but he was slumped over in his wheelchair. Paraplegic. On oxycodone at home.  Focused Assessment:  Obtunded. Only rouses to very painful stimuli. Groaning and restless after narcan. Still does not follow commands. Multiple sores to toes and wound to coccyx. Periods of apnea prior to narcan but maintains oxygen saturations and EtCo2.    Treatments and/or interventions provided:   Medications   vancomycin (VANCOCIN) 2,000 mg in 0.9% NaCl 500 mL intermittent infusion (2,000 mg Intravenous $New Bag 4/3/24 1745)   ondansetron (ZOFRAN) 2 MG/ML injection (has no administration in time range)   sodium chloride 0.9% BOLUS 1,000 mL (0 mLs Intravenous Stopped 4/3/24 1741)   piperacillin-tazobactam (ZOSYN) 4.5 g vial to attach to  mL bag (0 g Intravenous Stopped 4/3/24 1816)   naloxone (NARCAN) injection 0.4 mg (0.4 mg Intravenous $Given 4/3/24 1821)       Patient's response to treatments and/or interventions: more     To be done/followed up on inpatient unit:  continue to monitor    Does this patient have any cognitive concerns?: Alcohol/Drugs temporary cognitive concerns    Activity level - Baseline/Home:  Wheelchair  Activity Level - Current:   Unknown    Patient's Preferred language: English   Needed?: No    Isolation: Contact   Infection: Not Applicable  MRSA  VRE  Patient tested for COVID 19 prior to admission: NO  Bariatric?: No    Vital Signs:   Vitals:    04/03/24 1806 04/03/24 1807 04/03/24 1810 04/03/24 1826   BP:   (!) 154/66    Pulse: (!) 46 (!) 45 (!) 46 86   Resp: (!) 9 12 12 29   Temp:       TempSrc:        SpO2: 100% 98% 100% 99%   Weight:           Cardiac Rhythm:Cardiac Rhythm: Sinus bradycardia    Was the PSS-3 completed:   No -patient unable to answer  What interventions are required if any?               Family Comments: on one at bedside in ED  OBS brochure/video discussed/provided to patient/family: N/A              Name of person given brochure if not patient: n/a              Relationship to patient: n/a    For the majority of the shift this patient's behavior was Yellow.   Behavioral interventions performed were frequent rounding, reorientation.    ED NURSE PHONE NUMBER: *[phone]

## 2024-04-03 NOTE — ED NOTES
Upon arrival to ED with EMS, pt was unresponsive and RR 12, SpO2 100% with periods of apnea. Pupils fixed. RN notified Dr. Mcrae who immediately came to bedside to assess pt. Pt requiring sternal rub to rouse. CT of head ordered, labs ordered. See orders.

## 2024-04-03 NOTE — ED NOTES
Bed: ED10  Expected date:   Expected time:   Means of arrival:   Comments:  A 540 63 M AMS suspected narcotic overdose

## 2024-04-04 LAB
ANION GAP SERPL CALCULATED.3IONS-SCNC: 12 MMOL/L (ref 7–15)
BASE EXCESS BLDV CALC-SCNC: -1.1 MMOL/L (ref -3–3)
BUN SERPL-MCNC: 18.1 MG/DL (ref 8–23)
CALCIUM SERPL-MCNC: 8.9 MG/DL (ref 8.8–10.2)
CHLORIDE SERPL-SCNC: 110 MMOL/L (ref 98–107)
CREAT SERPL-MCNC: 0.51 MG/DL (ref 0.67–1.17)
DEPRECATED HCO3 PLAS-SCNC: 22 MMOL/L (ref 22–29)
EGFRCR SERPLBLD CKD-EPI 2021: >90 ML/MIN/1.73M2
ERYTHROCYTE [DISTWIDTH] IN BLOOD BY AUTOMATED COUNT: 18.2 % (ref 10–15)
GLUCOSE SERPL-MCNC: 91 MG/DL (ref 70–99)
HCO3 BLDV-SCNC: 27 MMOL/L (ref 21–28)
HCT VFR BLD AUTO: 33.7 % (ref 40–53)
HGB BLD-MCNC: 10.4 G/DL (ref 13.3–17.7)
MAGNESIUM SERPL-MCNC: 2 MG/DL (ref 1.7–2.3)
MCH RBC QN AUTO: 24 PG (ref 26.5–33)
MCHC RBC AUTO-ENTMCNC: 30.9 G/DL (ref 31.5–36.5)
MCV RBC AUTO: 78 FL (ref 78–100)
O2/TOTAL GAS SETTING VFR VENT: 1 %
OXYHGB MFR BLDV: 35 % (ref 70–75)
PCO2 BLDV: 62 MM HG (ref 40–50)
PH BLDV: 7.25 [PH] (ref 7.32–7.43)
PHOSPHATE SERPL-MCNC: 3.9 MG/DL (ref 2.5–4.5)
PLATELET # BLD AUTO: 327 10E3/UL (ref 150–450)
PO2 BLDV: 26 MM HG (ref 25–47)
POTASSIUM SERPL-SCNC: 4.1 MMOL/L (ref 3.4–5.3)
RBC # BLD AUTO: 4.33 10E6/UL (ref 4.4–5.9)
SAO2 % BLDV: 35.5 % (ref 70–75)
SODIUM SERPL-SCNC: 144 MMOL/L (ref 135–145)
TROPONIN T SERPL HS-MCNC: 33 NG/L
WBC # BLD AUTO: 11.1 10E3/UL (ref 4–11)

## 2024-04-04 PROCEDURE — 84484 ASSAY OF TROPONIN QUANT: CPT | Performed by: HOSPITALIST

## 2024-04-04 PROCEDURE — 85027 COMPLETE CBC AUTOMATED: CPT | Performed by: HOSPITALIST

## 2024-04-04 PROCEDURE — 250N000013 HC RX MED GY IP 250 OP 250 PS 637: Performed by: HOSPITALIST

## 2024-04-04 PROCEDURE — 250N000013 HC RX MED GY IP 250 OP 250 PS 637: Performed by: STUDENT IN AN ORGANIZED HEALTH CARE EDUCATION/TRAINING PROGRAM

## 2024-04-04 PROCEDURE — 120N000001 HC R&B MED SURG/OB

## 2024-04-04 PROCEDURE — 258N000003 HC RX IP 258 OP 636: Performed by: HOSPITALIST

## 2024-04-04 PROCEDURE — G0463 HOSPITAL OUTPT CLINIC VISIT: HCPCS

## 2024-04-04 PROCEDURE — 250N000011 HC RX IP 250 OP 636: Performed by: STUDENT IN AN ORGANIZED HEALTH CARE EDUCATION/TRAINING PROGRAM

## 2024-04-04 PROCEDURE — 250N000011 HC RX IP 250 OP 636: Performed by: HOSPITALIST

## 2024-04-04 PROCEDURE — 99233 SBSQ HOSP IP/OBS HIGH 50: CPT | Performed by: STUDENT IN AN ORGANIZED HEALTH CARE EDUCATION/TRAINING PROGRAM

## 2024-04-04 PROCEDURE — 80048 BASIC METABOLIC PNL TOTAL CA: CPT | Performed by: HOSPITALIST

## 2024-04-04 PROCEDURE — 36415 COLL VENOUS BLD VENIPUNCTURE: CPT | Performed by: HOSPITALIST

## 2024-04-04 PROCEDURE — 84100 ASSAY OF PHOSPHORUS: CPT | Performed by: HOSPITALIST

## 2024-04-04 PROCEDURE — 258N000003 HC RX IP 258 OP 636: Performed by: STUDENT IN AN ORGANIZED HEALTH CARE EDUCATION/TRAINING PROGRAM

## 2024-04-04 PROCEDURE — 83735 ASSAY OF MAGNESIUM: CPT | Performed by: HOSPITALIST

## 2024-04-04 RX ORDER — PIPERACILLIN SODIUM, TAZOBACTAM SODIUM 4; .5 G/20ML; G/20ML
4.5 INJECTION, POWDER, LYOPHILIZED, FOR SOLUTION INTRAVENOUS EVERY 6 HOURS
Status: DISCONTINUED | OUTPATIENT
Start: 2024-04-04 | End: 2024-04-05 | Stop reason: ALTCHOICE

## 2024-04-04 RX ORDER — MULTIPLE VITAMINS W/ MINERALS TAB 9MG-400MCG
1 TAB ORAL DAILY
Status: DISCONTINUED | OUTPATIENT
Start: 2024-04-04 | End: 2024-04-08 | Stop reason: HOSPADM

## 2024-04-04 RX ORDER — VANCOMYCIN HYDROCHLORIDE 125 MG/1
125 CAPSULE ORAL 2 TIMES DAILY
Status: DISCONTINUED | OUTPATIENT
Start: 2024-04-04 | End: 2024-04-08 | Stop reason: HOSPADM

## 2024-04-04 RX ADMIN — VANCOMYCIN HYDROCHLORIDE 1250 MG: 10 INJECTION, POWDER, LYOPHILIZED, FOR SOLUTION INTRAVENOUS at 20:26

## 2024-04-04 RX ADMIN — SODIUM CHLORIDE: 9 INJECTION, SOLUTION INTRAVENOUS at 09:24

## 2024-04-04 RX ADMIN — VANCOMYCIN HYDROCHLORIDE 125 MG: 125 CAPSULE ORAL at 20:19

## 2024-04-04 RX ADMIN — OXYCODONE HYDROCHLORIDE 2.5 MG: 5 TABLET ORAL at 17:59

## 2024-04-04 RX ADMIN — ACETAMINOPHEN 650 MG: 325 TABLET, FILM COATED ORAL at 20:19

## 2024-04-04 RX ADMIN — PIPERACILLIN AND TAZOBACTAM 4.5 G: 4; .5 INJECTION, POWDER, FOR SOLUTION INTRAVENOUS at 12:34

## 2024-04-04 RX ADMIN — APIXABAN 5 MG: 5 TABLET, FILM COATED ORAL at 20:19

## 2024-04-04 RX ADMIN — PIPERACILLIN AND TAZOBACTAM 4.5 G: 4; .5 INJECTION, POWDER, FOR SOLUTION INTRAVENOUS at 17:35

## 2024-04-04 RX ADMIN — FAMOTIDINE 20 MG: 10 INJECTION, SOLUTION INTRAVENOUS at 12:36

## 2024-04-04 RX ADMIN — APIXABAN 5 MG: 5 TABLET, FILM COATED ORAL at 09:24

## 2024-04-04 RX ADMIN — OXYCODONE HYDROCHLORIDE 2.5 MG: 5 TABLET ORAL at 22:06

## 2024-04-04 RX ADMIN — OXYCODONE HYDROCHLORIDE 2.5 MG: 5 TABLET ORAL at 12:34

## 2024-04-04 RX ADMIN — FAMOTIDINE 20 MG: 10 INJECTION, SOLUTION INTRAVENOUS at 00:46

## 2024-04-04 ASSESSMENT — ACTIVITIES OF DAILY LIVING (ADL)
ADLS_ACUITY_SCORE: 53
ADLS_ACUITY_SCORE: 57
ADLS_ACUITY_SCORE: 57
ADLS_ACUITY_SCORE: 41
ADLS_ACUITY_SCORE: 57
ADLS_ACUITY_SCORE: 41
ADLS_ACUITY_SCORE: 53
ADLS_ACUITY_SCORE: 41
ADLS_ACUITY_SCORE: 41
ADLS_ACUITY_SCORE: 53
ADLS_ACUITY_SCORE: 57
ADLS_ACUITY_SCORE: 57
ADLS_ACUITY_SCORE: 41
DEPENDENT_IADLS:: SHOPPING;CLEANING;LAUNDRY;TRANSPORTATION
ADLS_ACUITY_SCORE: 57
ADLS_ACUITY_SCORE: 39
ADLS_ACUITY_SCORE: 41

## 2024-04-04 NOTE — CONSULTS
"            [facility]    Neurology Consultation    Jose Lopez MRN# [id number]   YOB: 1960 Age: 63 year old    Code Status:Full Code   Date of Admission: 4/3/2024  Date of Consult:04/04/2024                                                                                       Assessment and Plan:                                         #.  Encephalopathy  -- Likely drug-induced due to UDS showing fentanyl in addition to to his prior attempts and him being found down with \"drug paraphernalia surrounding him.\"   He is improving on exam with ability to speak and follow simple commands but still is altered.   Continue to monitor, due to improvement no need for further investigations at this time.     #Concern for opioid withdrawal  Rhinorrhea, abdominal pain, and lacrimation  Defer to primary team for monitoring and treatment    Neurology will peripherally follow.     ----------------------------------------------------------------------------------  ----------------------------------------------------------------------------------  Reason for consult: as above       Chief Complaint:   Altered mental status           History of Present Illness:   This patient is a 63 year old male who presents with encephalopathy following a drug overdose      Patient was found yesterday slumped over and unresponsive by his physical therapist who came to check on him.  Reportedly he had \"drug paraphernalia\" surrounding him.  He has chronic pain and is on chronic opioids, last filled on 4/3.  He is a paraplegic with complications of neurogenic bladder, neurogenic bowel, muscle spasticity, all due to a spinal cord injury at the T9 level.  His toe appeared infected upon admission and so he was given vancomycin and Zosyn.  UA not obviously infectious but still started on antibiotics.  Was given Narcan and was reportedly more agitated following this but did not wake up.  he was placed on a BiPAP in the ER.  CT head " without acute abnormalities.  Labs in the ER were notable for WBC 11.1, moderate bacteria but negative nitrites and leukocyte esterases in his UA.  Troponin high-sensitivity was 30, his EKG showed sinus bradycardia.  Urine drug screen was positive for fentanyl and cannabinoids.    Patient has had a similar presentation on 9/8/2023 where he is found down and given Narcan but remained encephalopathic for 24 hours until he came around.    MEDICAL DOCUMENTATION REVIEWED: H&P, ER note           Past Medical History:     Past Medical History:   Diagnosis Date    Benzodiazepine dependence (H)     Muscle spasticity     Neurogenic bladder     Neurogenic bowel     Paraplegia (H)     Sepsis (H)     from UTI    Spinal cord injury at T9 level (H) 1994    per chart review    Substance abuse (H)     UTI (urinary tract infection)          Past Surgical History:     Past Surgical History:   Procedure Laterality Date    BACK SURGERY      CHOLECYSTECTOMY      EXAM UNDER ANESTHESIA RECTUM N/A 5/1/2019    Procedure: EXAM UNDER ANESTHESIA, RECTUM;  Surgeon: Tiburcio Barros MD;  Location: [facility] OR    FISTULOTOMY RECTUM N/A 5/1/2019    Procedure: FISTULOTOMY AND REMOVAL OF ANAL SKIN TAGS;  Surgeon: Tiburcio Barros MD;  Location: [facility] OR    ORTHOPEDIC SURGERY            Social History:     Social History     Socioeconomic History    Marital status: Single   Tobacco Use    Smoking status: Never    Smokeless tobacco: Never   Substance and Sexual Activity    Alcohol use: No    Drug use: No     See HPI      Family History:   No family history on file.      Prior to Admission Medications   Prescriptions Last Dose Informant Patient Reported? Taking?   DULoxetine (CYMBALTA) 60 MG capsule   No No   Sig: Take 1 capsule (60 mg) by mouth daily   HYDROcodone-acetaminophen (NORCO)  MG per tablet   Yes Yes   Sig: Take 1 tablet by mouth every 4 hours as needed for severe pain   apixaban ANTICOAGULANT (ELIQUIS) 5 MG tablet   No No   Sig: Take 1 tablet  (5 mg) by mouth 2 times daily   baclofen (LIORESAL) 20 MG tablet   Yes Yes   baclofen (LIORESAL) 5 mg TABS half-tab   Yes Yes   cyclobenzaprine (FLEXMID) 7.5 MG tablet   No No   Sig: Take 1 tablet (7.5 mg) by mouth 3 times daily as needed for muscle spasms   ferrous sulfate (FEROSUL) 325 (65 Fe) MG tablet   Yes No   Sig: Take 325 mg by mouth daily (with breakfast)   folic acid (FOLVITE) 1 MG tablet   Yes No   Sig: Take 1 mg by mouth daily   gabapentin (NEURONTIN) 100 MG capsule   Yes Yes   hydrOXYzine HCl (ATARAX) 25 MG tablet   Yes Yes   Sig: Take 25 mg by mouth every 8 hours as needed   naloxone (NARCAN) 4 MG/0.1ML nasal spray   Yes No   Sig: Spray 4 mg into one nostril alternating nostrils as needed for opioid reversal every 2-3 minutes until assistance arrives   oxyCODONE (ROXICODONE) 5 MG tablet   Yes No   Sig: Take 15 mg by mouth 3 times daily   oxybutynin (DITROPAN) 5 MG tablet   Yes No   Sig: Take 5 mg by mouth 3 times daily   polyethylene glycol (MIRALAX) 17 GM/Dose powder   No No   Sig: Take 17 g by mouth daily as needed for constipation   Patient taking differently: Take 17 g by mouth daily   pregabalin (LYRICA) 100 MG capsule   No No   Sig: Take 1 capsule (100 mg) by mouth 2 times daily   rivaroxaban ANTICOAGULANT (XARELTO) 20 MG TABS tablet   Yes Yes   senna (SENOKOT) 8.6 MG tablet   Yes No   Sig: Take 1 tablet by mouth 2 times daily   senna-docusate (SENOKOT-S/PERICOLACE) 8.6-50 MG tablet   No No   Sig: Take 1 tablet by mouth 2 times daily   Patient taking differently: Take 1 tablet by mouth 2 times daily as needed   simethicone (MYLICON) 125 MG chewable tablet   Yes No   Sig: Take 125 mg by mouth 3 times daily as needed   sulfamethoxazole-trimethoprim (BACTRIM DS) 800-160 MG tablet   No No   Sig: Take 1 tablet by mouth 2 times daily   vancomycin (VANCOCIN) 125 MG capsule   No No   Sig: Take 1 capsule (125 mg) by mouth 2 times daily      Facility-Administered Medications: None          Allergy:      Allergies   Allergen Reactions    Sertraline Other (See Comments)     Other reaction(s): Gastrointestinal  Other reaction(s): Gastrointestinal          Inpatient Medications:   Scheduled Meds:  Current Facility-Administered Medications   Medication Dose Route Frequency Provider Last Rate Last Admin    apixaban ANTICOAGULANT (ELIQUIS) tablet 5 mg  5 mg Oral BID Rachelle Chacko DO   5 mg at 04/04/24 0924    famotidine (PEPCID) injection 20 mg  20 mg Intravenous Q12H Rachelle Chacko DO   20 mg at 04/04/24 0046    multivitamin w/minerals (THERA-VIT-M) tablet 1 tablet  1 tablet Oral Daily Nj Walsh MD        piperacillin-tazobactam (ZOSYN) 4.5 g vial to attach to  mL bag  4.5 g Intravenous Q6H Nj Walsh MD        sodium chloride (PF) 0.9% PF flush 3 mL  3 mL Intracatheter Q8H Rachelle Chacko DO         PRN Meds:   Current Facility-Administered Medications   Medication Dose Route Frequency Provider Last Rate Last Admin    acetaminophen (TYLENOL) tablet 650 mg  650 mg Oral Q4H PRN Rachelle Chacko DO        Or    acetaminophen (TYLENOL) Suppository 650 mg  650 mg Rectal Q4H PRN Rachelle Chcako DO        calcium carbonate (TUMS) chewable tablet 1,000 mg  1,000 mg Oral 4x Daily PRN Rachelle Chacko DO        lidocaine (LMX4) cream   Topical Q1H PRN Rachelle Chacko DO        lidocaine 1 % 0.1-1 mL  0.1-1 mL Other Q1H PRN Rachelle Chacko DO        ondansetron (ZOFRAN ODT) ODT tab 4 mg  4 mg Oral Q6H PRN Rachelle Chacko DO        Or    ondansetron (ZOFRAN) injection 4 mg  4 mg Intravenous Q6H PRN Rachelle Chacko DO        oxyCODONE (ROXICODONE) tablet 5 mg  5 mg Oral Q4H PRN Rachelle Chacko DO        oxyCODONE IR (ROXICODONE) half-tab 2.5 mg  2.5 mg Oral Q4H PRN Rachelle Chacko Patricia, DO        senna-docusate (SENOKOT-S/PERICOLACE) 8.6-50 MG per tablet 1  tablet  1 tablet Oral BID Rachelle Malcolm DO        Or    senna-docusate (SENOKOT-S/PERICOLACE) 8.6-50 MG per tablet 2 tablet  2 tablet Oral BID IKERN Rachelle Chacko DO        sodium chloride (PF) 0.9% PF flush 3 mL  3 mL Intracatheter q1 min Rachelle Malcolm DO                 Physical Exam:   Physical Exam   Vitals:  Height:Data Unavailable  Weight:149 lbs 4.02 oz   Temp: 98.2  F (36.8  C) Temp src: Oral BP: 120/55 Pulse: 64   Resp: 16 SpO2: 100 % O2 Device: None (Room air) Oxygen Delivery: 1 LPM  General Appearance: Appears to be acute distress, upset and states he is in pain. Rhinorrhea  and lacrimation noted.   Neuro Exam:  Mental Status Exam: Alert but perseverating on oxycodone and [facility].  Language and speech intact.   Cranial Nerves: Vision/visual fields intact to threat. Pupils are equal and reactive to light. Extraocular movements intact. Facial strength and sensation is normal. No jaw or tongue deviation.  Motor: Moves upper extremities antigravity but does not follow commands for confrontational testing. No movement in BLE.   Reflexes: Normal in BUE   Sensory: Intact in BUE but diminished in BLE.   Coordination: Unable to perform, could not follow instructions.   Extremities: No clubbing, no cyanosis, no edema          Data:   ROUTINE IP LABS   CBC RESULTS:     Recent Labs   Lab 04/04/24  0911 04/03/24  1514   WBC 11.1* 11.1*   RBC 4.33* 4.76   HGB 10.4* 11.5*   HCT 33.7* 37.3*    412     Basic Metabolic Panel:   Recent Labs   Lab Test 04/04/24  0911 04/03/24  1514 09/11/23  1041    139 139   POTASSIUM 4.1 4.2 4.0   CHLORIDE 110* 100 102   CO2 22 23 27   BUN 18.1 33.2* 18.5   CR 0.51* 0.72 0.54*   GLC 91 102* 97   MAK 8.9 9.4 9.1     Liver panel:  Recent Labs   Lab Test 04/03/24  1514 09/08/23  1952 05/20/23  1642 12/03/18 2057 08/16/18 2230   PROTTOTAL 7.9 7.2 8.0 7.6 7.8   ALBUMIN 4.2 3.4* 4.1 3.8 4.1   BILITOTAL <0.2 0.3 0.4 0.3 0.5    ALKPHOS 106 171* 116 84 87   AST 18 44 16 24 61*   ALT 15 26 14 35 67     Lipid Profile:No lab results found.  Thyroid Panel:No lab results found.   Vitamin B12: No lab results found.        IMAGING:   Independent interpretation of the following studies by myself as part of today's encounter.   CT head 4/3/24:   --FINDINGS:  INTRACRANIAL CONTENTS: No intracranial hemorrhage, extraaxial collection, or mass effect.  No CT evidence of acute infarct. Normal parenchymal attenuation. Normal ventricles and sulci.      VISUALIZED ORBITS/SINUSES/MASTOIDS: No intraorbital abnormality. Mucosal thickening primarily involving the ethmoid air cells. No middle ear or mastoid effusion.     BONES/SOFT TISSUES: No acute abnormality.                                                               IMPRESSION:  1.  No acute intracranial process.      Time:  60 minutes evaluation and management.     Amalia Smith M.D.  [facility]  Office [phone]

## 2024-04-04 NOTE — CONSULTS
"[facility]  WO Nurse Inpatient Assessment     Consulted for: Colostomy, feet and sacrum    Summary: Paraplegic, established colostomy : coloplast 1 pc flat # [id number]  Pressure injury to right heel, POA  Sacrum: no injury, scar tissue. Right low buttock with abrasions  multiple lower leg abrasions/excoriated areas, friction to scrotum. WOC not consulted for these areas, incidental findings, see media    Patient History (according to provider note(s):      Jose Lopez is a 63 year old male with PMH paraplegia, neurogenic bowel and bladder, recent UTIs due to self catheterization admitted on 4/3/2024 with suspected toxic encephalopathy due to drug overdose.     Toxic encephalopathy, drug overdose  Acute hypoxic, hypercapneic respiratory failure  Respiratory acidosis  Sinus bradycardia  Found slumped over with \"drug paraphernalia\" around him at his home. Received narcan in ED after his UDS was positive for fentanyl and cannabinoids. Was more agitated after narcan, but didn't really wake up. Received 1L IVF. Was trialed on bipap for 2 hours to see if he would wake up more, but he did not wake up--however HR and BP did improve.  *4/3 CT head: no acute intracranial process  *Of note this is very similar presentation to 9/8/23 hospitalization where he was found down, given narcan and then remained encephalopathic for 20h until he came back around  - continue to monitor mental status  - NS @100ml/hr  - neurochecks q4h  - HRs in 40s initially, improved to upper 50-60s, continue to monitor on telemetry  - wean oxygen as able  - monitor for fever  - sitter at bedside   Coccyx wound and toe abrasions  -Consult wound RN for eval   Elevated troponin, suspect type 2 NSTEMI  - Trop 30, trend once more  - telemetry   T9 Paraplegia (HRC)  Neurogenic bowel  Chronic constipation  - ostomy RN consulted  - await confirmation of current bowel regimen, resume as able   Recent sepsis/UTI due to self cath  Admitted " at [facility] 3/20-3/27/24. Enterococcus treated with ampicillin for 7 days, finished treatment on 3/25  4/3 UA not concerning for infection  - continue straight cath q6h  - did receive vanc/zosyn in ED, but will monitor off further abx  History of Cdiff colitis  -monitor stools  History of DVT (deep vein thrombosis)  Resume PTA DOAC, seems he recently switched from xarelto eliquis   Esophagitis  Noted during 3/20 admit at [facility]  - continue famotidine BID       Assessment:      Areas visualized during today's visit: Focused: and Colostomy, feet and sacrum    Pressure Injury Location: Right heel      Last photo: 4/4/24  Wound type: Pressure Injury     Pressure Injury Stage: either Stage 2 or 3 deferring definitive staging until wound base has evolved, present on admission       Wound history/plan of care:   Pressure injury to right heel, previous stage is unknown after chart review, POA.   Dry skin surrounding, appears like a  yellow and brown callous formation, firm to touch. Wound base is red and dry, pale pink to white wound edges. Appears as a stage 2, will continue to monitor for evolvement or improvement  Wound base: 100 %  firm dry skin forming to wound edges , wound base: 100 % dermis and non-granular tissue     Palpation of the wound bed: normal      Drainage: none     Description of drainage: none     Measurements (length x width x depth, in cm) 0.7 x 1  x 0.1 cm      Tunneling N/A     Undermining N/A  Periwound skin: Dry/scaly and firm and likely calloused      Color:  yellow and brown      Temperature: normal   Odor: none  Pain: absent, none  Pain intervention prior to dressing change: patient tolerated well, no significant pain present , and slow and gentle cares   Treatment goal: Infection control/prevention, Increase moisture , Maintain (prevention of deterioration), and Protection  STATUS: initial assessment  Supplies ordered: ordered Hydrogel, discussed with RN, and discussed with  patient     My PI Risk Assessment     Sensory Perception: 2 - Very Limited     Moisture: 3 - Occasionally moist      Activity: 2 - Chairfast     Mobility: 2 - Very limited     Nutrition: 2 - Probably inadequate      Friction/Shear: 2 - Potential problem      TOTAL: 13    Wound location: Bilateral toes     Right toes     Left toes    Last photo: 4/4/24  Wound due to: Mixed Etiology: trauma, pressure vs vascular/neuropathic  Wound history/plan of care: Unable to determine etiology, appear like trauma pressure injuries vs vascular injury. Multiple toes to bilateral feet with open, red, moist ulcerations. Left 4th and 5th toes with a purple halo, unable to determine if gangrenous. Both feet are edematous and weepy. Recommend compression evaluation per Physical Therapist, Vascular studies. All areas POA. Incidental finding to bilateral lower extremities: multiple areas of excoriation and dried scabs, unable to determine if this is trauma vs excoriation form scratching, see additional media.  Wound base: right toes: 100 % erythema, maroon, and dermis, left 4th toe: 100 % non-blanchable, erythema, purple, and wet  ,   left 5th toe: 100% red, moist, darkening tissue to wound edges and at 9 o'clock position     Palpation of the wound bed: boggy      Drainage: scant     Description of drainage: serosanguinous     Measurements (length x width x depth, in cm): multiple areas to bilateral toes: largest left 4th toe: 1.2  x 1  x  0 cm      Tunneling: N/A     Undermining: N/A  Periwound skin: Edematous and purple halos weepy      Color: dusky and purple      Temperature: normal   Odor: none  Pain: absent, none  Pain interventions prior to dressing change: patient tolerated well, no significant pain present , and slow and gentle cares   Treatment goal: Drainage control, Infection control/prevention, Maintain (prevention of deterioration), and Protection  STATUS: initial assessment  Supplies ordered: ordered Aquacel AG ( # [id number]),  discussed with RN, and discussed with patient         Skin Injury Location: Right  Low buttock     Right low buttock    Last photo: 4/4/24  Skin injury due to: Abrasion and Friction  Skin history and plan of care:   scattered patches of shallow friction tears, suspect related to transferring to and from wheelchair and bed. Patient has scar tissue to left IT area form previous healed pressure injury.    Affected area:      Skin assessment: Dry/flaky     Measurements (length x width x depth, in cm) largest area to low buttock is: 1  x 1  x  0 cm      Color: pink     Temperature  normal      Drainage: none .      Color: none      Odor: none  Pain: absent, none  Pain interventions prior to dressing change: patient tolerated well, no significant pain present , and slow and gentle cares   Treatment goal: Heal , Infection control/prevention, Maintain (prevention of deterioration), and Protection  STATUS: initial assessment  Supplies ordered: at bedside, supplies stored on unit, discussed with RN, and discussed with patient        4/4/24: incidental finding of multiple excoriations and dry scabs to bilateral lower legs, recommend mepilex foam dressing for protection. Friction and MASD to underside of scrotum, patient was in wet brief at time of assessment, this was removed, recommending incontinence skin care protocol and strict PIP measures. See additional media.    Assessment of Established end Colostomy:  How long has patient had ostomy: 2023  Stoma: viable, healthy, normal-appearing, red, round, moist, and protruberant  Mucutaneous junction: not visualized (barrier in place)  Peristomal skin: none   Output: gas, soft, formed, and brown    Is patient independent with ostomy care?: Not at this time due to encephalopathy  Home pouching system: Coloplast 1 pc fecal pouch # [id number]  Pouching issues and/or educational needs:Stoma assessment, Pouching system assessment , Evaluate leakage issue, and Adjustment of pouching  "plan  Interventions completed today: Stoma assessment, Pouching system assessment , Evaluate leakage issue, and Adjustment of pouching plan  Pouching system in use while hospitalized:  Coloplast one piece, cut to fit, and flat  Supplies location: ordered Coloplast Sensura Fecal 1 pc pouch ( # [id number]), discussed with RN, and discussed with patient          Treatment Plan:     LUQ Colostomy pouching plan:   Pouching system: ostomy supplies pouches: Coloplast sensura Fecal 1 pc pouch ( # [id number])    Accessories used: [facility] ostomy accessories: Powder (569673) and 4 x 4\" Gauze tray (10 pack) (681261)   Frequency of pouch changes: Twice weekly and PRN leakage  WOC follow up plan: Weekly  and Notify WOC if leakage occurs  Bedside RN interventions: Change pouch PRN if leaking using the supplies above, Empty pouch when 1/3 to 1/2 full, ensure to clean pouch outlet after emptying to prevent odor, Notify WOC for ongoing pouch leakage, Document stoma appearance and output volume, color, and consistency every shift, and Encourage patient to empty pouch independently     Bilateral toe wound(s): Every other day and PRN if dressings are loose or soiled  Cleanse wound bed with Vashe wound cleanser (# 108659) and pat dry gently.  Cut a piece of Aquacel AG(#810255) to fit onto the wound beds on the top of the toes  In addition, cut long strips of Aquacel AG and weave in between toes to assist with moisture management.   Ensure to cover entire wound bed.  May apply guaze 4x4 to cover if needed and then a stockinette to hold dressing in place, may utilize socks if they are an appropriate fit  Change dressing every other day.    Right heel: Every three days  Cleanse the wound with Vashe wound cleanser (# 484604) and pat dry.  Apply No sting film barrier to periwound skin.  Cover wound with dime thick layer of Triad paste (#957523) to fill wound.   Apply a thin layer of Triad paste over the yelllow/brown callous like tissue surrounding the " "wound.  Cover wound with 4x4 Mepilex flex (#861442).  With repeated dressing changes clean off top/ soiled layer of paste only and reapply. If complete removal of paste is needed use baby oil (#392727) to aid in removal.  Turn and reposition Q 2hrs side to side only.    Left heel: every three days  Cleanse the area with NS and pat dry.  Apply No sting film barrier to periwound skin.  Cover wound with 4x4 Mepilex flex (#478926).  Turn and reposition Q 2hrs side to side only.    Pressure Injury Prevention (PIP) Plan:  If patient is declining pressure injury prevention interventions: Explore reason why and address patient's concerns, Educate on pressure injury risk and prevention intervention(s), If patient is still declining, document \"informed refusal\" , and Ensure Care team is aware ( provider, charge nurse, etc)  Mattress: Follow bed algorithm, reassess daily and order specialty mattress, if indicated.  HOB: Maintain at or below 30 degrees, unless contraindicated  Repositioning in bed: Every 1-2 hours , Left/right positioning; avoid supine, Raise foot of bed prior to raising head of bed, to reduce patient sliding down (shear), and Frequent microturns using TAPS wedges, as patient tolerates  Heels: Keep elevated off mattress, Pillows under calves, and Heel lift boots  Protective Dressing: Sacral Mepilex for prevention (#504017),  especially for the agitated patient   Positioning Equipment: TAPS wedges (#025160) to help maintain 30 degree side lying position   Chair positioning: Chair cushion (#656952) , Assist patient to reposition hourly, and Do NOT use a donut for sitting (this increases pressure to smaller area and creates a higher potential for injury)    If patient has a buttock pressure injury, or high risk for PI use chair cushion or SPS.  Moisture Management: Perineal cleansing /protection: Follow Incontinence Protocol, Avoid brief in bed, Clean and dry skin folds with bathing , Consider InterDry (#944685) " between folds, and Moisturize dry skin  Under Devices: Inspect skin under all medical devices during skin inspection , Ensure tubes are stabilized without tension, and Ensure patient is not lying on medical devices or equipment when repositioned  Ask provider to discontinue device when no longer needed.    Ensure pt has Joseluis-cushion while sitting up in the chair.  FYI- If pt has constant incontinent loose stools needing dressing changes Q shift please discontinue the Mepilex dressing and apply criticaid barrier paste BID and PRN.        Bilateral heels:  Offload heels with heel lift boots.   Remove boots for skin assessments Q shift and PRN.  Remove boots if patient is out of bed, up to wheelchair.   For concerns of skin integrity breakdown (notify MD and WOC)    Orders: Written    RECOMMEND PRIMARY TEAM ORDER: Podiatry consult and Vascular consult, Physical therapy for compression wrap evaluation  Education provided: importance of repositioning, plan of care, wound progress, Infection prevention , Moisture management, Hygiene, Off-loading pressure, and ostomy cares  Discussed plan of care with: Patient and Nurse  WOC nurse follow-up plan: weekly  Notify WOC if wound(s) deteriorate.  Nursing to notify the Provider(s) and re-consult the WOC Nurse if new skin concern.    DATA:     Current support surface: Standard  Standard gel/foam mattress (IsoFlex, Atmos air, etc)  Containment of urine/stool: Incontinence Protocol, Incontinent pad in bed, and Colostomy pouch  BMI: Body mass index is 21.42 kg/m .   Active diet order: Orders Placed This Encounter      Combination Diet Regular Diet Adult     Output: I/O last 3 completed shifts:  In: -   Out: 550 [Urine:550]     Labs:   Recent Labs   Lab 04/04/24  0911 04/03/24  1514   ALBUMIN  --  4.2   HGB 10.4* 11.5*   WBC 11.1* 11.1*     Pressure injury risk assessment:   Sensory Perception: 3-->slightly limited  Moisture: 3-->occasionally moist  Activity: 1-->bedfast  Mobility:  1-->completely immobile  Nutrition: 2-->probably inadequate  Friction and Shear: 2-->potential problem  Dino Score: 12    Cassandra Ackerman RN, BSN,CWON   Pager no longer is use, please contact through [employer] group: [facility] Nurse [place]  Dept. Office Number: [phone]

## 2024-04-04 NOTE — ED NOTES
Toes on both feet washed with soap and water, mepilex and bandaids placed. Vaseline applied to both lower legs and feet.

## 2024-04-04 NOTE — PROGRESS NOTES
RECEIVING UNIT ED HANDOFF REVIEW    ED Nurse Handoff Report was reviewed by: Dejah Mayfield RN on April 3, 2024 at 10:28 PM

## 2024-04-04 NOTE — ED NOTES
New colostomy bag placed as patient had pulled it off and no longer functioning. Pt continues to be fidgety, cannot follow directions. 1:1 sitter at bedside.

## 2024-04-04 NOTE — H&P
"[facility]    History and Physical - Hospitalist Service       Date of Admission:  4/3/2024    Assessment & Plan      Jose Lopez is a 63 year old male with PMH paraplegia, neurogenic bowel and bladder, recent UTIs due to self catheterization admitted on 4/3/2024 with suspected toxic encephalopathy due to drug overdose.    Toxic encephalopathy, drug overdose  Acute hypoxic, hypercapneic respiratory failure  Respiratory acidosis  Sinus bradycardia  Found slumped over with \"drug paraphernalia\" around him at his home. Received narcan in ED after his UDS was positive for fentanyl and cannabinoids. Was more agitated after narcan, but didn't really wake up. Received 1L IVF. Was trialed on bipap for 2 hours to see if he would wake up more, but he did not wake up--however HR and BP did improve.  *4/3 CT head: no acute intracranial process  *Of note this is very similar presentation to 9/8/23 hospitalization where he was found down, given narcan and then remained encephalopathic for 20h until he came back around  - continue to monitor mental status  - NS @100ml/hr  - neurochecks q4h  - HRs in 40s initially, improved to upper 50-60s, continue to monitor on telemetry  - wean oxygen as able  - monitor for fever  - sitter at bedside    Coccyx wound and toe abrasions  -Consult wound RN for eval    Elevated troponin, suspect type 2 NSTEMI  - Trop 30, trend once more  - telemetry    T9 Paraplegia (HRC)  Neurogenic bowel  Chronic constipation  - ostomy RN consulted  - await confirmation of current bowel regimen, resume as able    Recent sepsis/UTI due to self cath  Admitted at [facility] 3/20-3/27/24. Enterococcus treated with ampicillin for 7 days, finished treatment on 3/25  4/3 UA not concerning for infection  - continue straight cath q6h  - did receive vanc/zosyn in ED, but will monitor off further abx    History of Cdiff colitis  -monitor stools    History of DVT (deep vein thrombosis)  Resume " "PTA DOAC, seems he recently switched from xarelto eliquis    Esophagitis  Noted during 3/20 admit at [facility]  - continue famotidine BID        Diet: Combination Diet Regular Diet Adult    DVT Prophylaxis: DOAC  [place] Catheter: Not present  Lines: None     Cardiac Monitoring: ACTIVE order. Indication: Bradycardias (48 hours)  Code Status: Full Code    Clinically Significant Risk Factors Present on Admission               # Drug Induced Coagulation Defect: home medication list includes an anticoagulant medication      # Acute Respiratory Failure: based on blood gas results.  Continue supplemental oxygen as needed                Disposition Plan      Expected Discharge Date: 04/05/2024                  Rachelle Chacko DO  Hospitalist Service  [facility]  Securely message with [employer] (more info)  Text page via [employer] Paging/Directory     ______________________________________________________________________    Chief Complaint   Altered mental status    History is obtained from the patient's record, EMS report    History of Present Illness   Jose Lopez is a 63 year old male who presented from home via EMS with altered mentation. He was discharged from [facility] on 3/27 after treatment for UTI and sepsis. Since he was home, he evidently ran out of his pain medication and saw a physician on 4/2 who prescribed him more pain medication, but advised him to take medication with dosing more spread out. His UDS was negative for opioids, but was positive for fentanyl. He was found by his home PT to be slumped over and to have \"drug paraphernalia\" around him (apparently this was powder and pills which could have been medications). He is unable to provide history. He does open his eyes when jostled in bed.     Past Medical History    Past Medical History:   Diagnosis Date    Benzodiazepine dependence (H)     Muscle spasticity     Neurogenic bladder     Neurogenic bowel     Paraplegia (H)     " Sepsis (H)     from UTI    Spinal cord injury at T9 level (H) 1994    per chart review    Substance abuse (H)     UTI (urinary tract infection)        Past Surgical History   Past Surgical History:   Procedure Laterality Date    BACK SURGERY      CHOLECYSTECTOMY      EXAM UNDER ANESTHESIA RECTUM N/A 5/1/2019    Procedure: EXAM UNDER ANESTHESIA, RECTUM;  Surgeon: Tiburcio Barros MD;  Location: [facility] OR    FISTULOTOMY RECTUM N/A 5/1/2019    Procedure: FISTULOTOMY AND REMOVAL OF ANAL SKIN TAGS;  Surgeon: Tiburcio Barros MD;  Location: [facility] OR    ORTHOPEDIC SURGERY         Prior to Admission Medications   Prior to Admission Medications   Prescriptions Last Dose Informant Patient Reported? Taking?   DULoxetine (CYMBALTA) 60 MG capsule   No No   Sig: Take 1 capsule (60 mg) by mouth daily   HYDROcodone-acetaminophen (NORCO)  MG per tablet   Yes Yes   Sig: Take 1 tablet by mouth every 4 hours as needed for severe pain   apixaban ANTICOAGULANT (ELIQUIS) 5 MG tablet   No No   Sig: Take 1 tablet (5 mg) by mouth 2 times daily   baclofen (LIORESAL) 20 MG tablet   Yes Yes   baclofen (LIORESAL) 5 mg TABS half-tab   Yes Yes   cyclobenzaprine (FLEXMID) 7.5 MG tablet   No No   Sig: Take 1 tablet (7.5 mg) by mouth 3 times daily as needed for muscle spasms   ferrous sulfate (FEROSUL) 325 (65 Fe) MG tablet   Yes No   Sig: Take 325 mg by mouth daily (with breakfast)   folic acid (FOLVITE) 1 MG tablet   Yes No   Sig: Take 1 mg by mouth daily   gabapentin (NEURONTIN) 100 MG capsule   Yes Yes   hydrOXYzine HCl (ATARAX) 25 MG tablet   Yes Yes   Sig: Take 25 mg by mouth every 8 hours as needed   naloxone (NARCAN) 4 MG/0.1ML nasal spray   Yes No   Sig: Spray 4 mg into one nostril alternating nostrils as needed for opioid reversal every 2-3 minutes until assistance arrives   oxyCODONE (ROXICODONE) 5 MG tablet   Yes No   Sig: Take 15 mg by mouth 3 times daily   oxybutynin (DITROPAN) 5 MG tablet   Yes No   Sig: Take 5 mg by mouth 3 times daily    polyethylene glycol (MIRALAX) 17 GM/Dose powder   No No   Sig: Take 17 g by mouth daily as needed for constipation   Patient taking differently: Take 17 g by mouth daily   pregabalin (LYRICA) 100 MG capsule   No No   Sig: Take 1 capsule (100 mg) by mouth 2 times daily   rivaroxaban ANTICOAGULANT (XARELTO) 20 MG TABS tablet   Yes Yes   senna (SENOKOT) 8.6 MG tablet   Yes No   Sig: Take 1 tablet by mouth 2 times daily   senna-docusate (SENOKOT-S/PERICOLACE) 8.6-50 MG tablet   No No   Sig: Take 1 tablet by mouth 2 times daily   Patient taking differently: Take 1 tablet by mouth 2 times daily as needed   simethicone (MYLICON) 125 MG chewable tablet   Yes No   Sig: Take 125 mg by mouth 3 times daily as needed   sulfamethoxazole-trimethoprim (BACTRIM DS) 800-160 MG tablet   No No   Sig: Take 1 tablet by mouth 2 times daily   vancomycin (VANCOCIN) 125 MG capsule   No No   Sig: Take 1 capsule (125 mg) by mouth 2 times daily      Facility-Administered Medications: None           Physical Exam   Vital Signs: Temp: 97.6  F (36.4  C) Temp src: Axillary BP: (!) 140/71 Pulse: 60   Resp: 16 SpO2: 98 % O2 Device: Nasal cannula Oxygen Delivery: 1 LPM  Weight: 149 lbs 4.02 oz    Constitutional: Lethargic, moans with sternal rub. Initially with agitated arm movements, later relaxed after straight cath  Respiratory: Clear to auscultation bilaterally, no crackles or wheezing  Cardiovascular: Regular rate and rhythm, normal S1 and S2, and no murmur noted  GI: Normal bowel sounds, soft, non-distended, non-tender, ostomy in place with brown stool.   Skin/Integumen: No rashes, no cyanosis, no edema  Other:  Pupils very slow to react, but equal    Medical Decision Making       95 MINUTES SPENT BY ME on the date of service doing chart review, history, exam, documentation & further activities per the note.      Data     I have personally reviewed the following data over the past 24 hrs:    11.1 (H)  \   11.5 (L)   / 412     139 100 33.2 (H)  /  102 (H)   4.2 23 0.72 \     ALT: 15 AST: 18 AP: 106 TBILI: <0.2   ALB: 4.2 TOT PROTEIN: 7.9 LIPASE: N/A     Trop: 30 (H) BNP: N/A     Procal: N/A CRP: N/A Lactic Acid: 1.6         Imaging results reviewed over the past 24 hrs:   Recent Results (from the past 24 hour(s))   CT Head w/o Contrast    Narrative    EXAM: CT HEAD W/O CONTRAST  LOCATION: [facility]  DATE: 4/3/2024    INDICATION: altered mental status  COMPARISON: 12/30/2023.  TECHNIQUE: Routine CT Head without IV contrast. Multiplanar reformats. Dose reduction techniques were used.    FINDINGS:  INTRACRANIAL CONTENTS: No intracranial hemorrhage, extraaxial collection, or mass effect.  No CT evidence of acute infarct. Normal parenchymal attenuation. Normal ventricles and sulci.     VISUALIZED ORBITS/SINUSES/MASTOIDS: No intraorbital abnormality. Mucosal thickening primarily involving the ethmoid air cells. No middle ear or mastoid effusion.    BONES/SOFT TISSUES: No acute abnormality.      Impression    IMPRESSION:  1.  No acute intracranial process.

## 2024-04-04 NOTE — CONSULTS
"CLINICAL NUTRITION SERVICES  -  ASSESSMENT NOTE    Recommendations Ordered by Registered Dietitian (RD):   - Magic Cup w/ meals   - Thera vit M   Malnutrition:   % Weight Loss:  Weight loss does not meet criteria for malnutrition at this time  % Intake:  Unable to fully assess - pt not alert to provide history  Subcutaneous Fat Loss:  Orbital region moderate depletion  Muscle Loss:  Temporal region moderate depletion and Clavicle bone region moderate depletion  Fluid Retention:  Mild 2+    Malnutrition Diagnosis: Moderate malnutrition  In Context of:  Acute on Chronic illness or injury     REASON FOR ASSESSMENT  Jose Lopez is a 63 year old male seen by Registered Dietitian for Nutrition Admission Risk Screen Received -   Have you recently lost weight without trying ? - \"unsure\"  Have you been eating poorly due to a decreased appetite ?- \"no\"    NUTRITION HISTORY  - Information obtained from chart review. Visited with patient, but he was asleep. Per care team he is not able to provide history currently, therefore, did not attempt to waken.   - Recent admission to outside hospital. Obtained the following information from an inpatient RD visit on 3/21/24:   - Pt had reported recent wt loss after getting a colostomy. Wt eventually stabilized at 150#. Was eating fine until just prior to admission.    - Likes magic cup.     - Seemingly multiple admissions already this year.     CURRENT NUTRITION ORDERS  Diet Order:     Regular     Current Intake/Tolerance:  Poor intake due to altered mentation. Takes bites, then spits back out.     NUTRITION FOCUSED PHYSICAL ASSESSMENT FOR DIAGNOSING MALNUTRITION)  Yes         Observed:    Muscle wasting (refer to documentation in Malnutrition section) and Subcutaneous fat loss (refer to documentation in Malnutrition section)    Obtained from Chart/Interdisciplinary Team:  Somnolent. Unable to conduct Neuro exam.   Colostomy bag  WOCN consulted for ostomy     ANTHROPOMETRICS  Height: " "5' 10\"  Weight: 149 lbs 4.02 oz (67.7 kg)  BMI not validated in paraplegia   IBW: ~71 kg- adjusted for paraplegia   % IBW: 95%  Weight History:   9.6% wt loss since September (7 months). Wt up from recent admission - possibly skewed by difference in scales, fluid shifts, or other acute factors.     Wt Readings from Last 10 Encounters:   04/04/24 67.7 kg (149 lb 4 oz)   09/09/23 74.9 kg (165 lb 2 oz)   05/20/23 72.6 kg (160 lb)   05/14/23 58 kg (127 lb 13.9 oz)   05/01/19 76.2 kg (168 lb)   03/21/19 81.7 kg (180 lb 1.6 oz)   12/03/18 79.2 kg (174 lb 9.7 oz)   08/16/18 75.3 kg (166 lb 0.1 oz)   04/19/18 88.5 kg (195 lb)   03/28/18 88.5 kg (195 lb)       62.4 kg (137 lb 9.6 oz) 03/21/2024       LABS  Labs reviewed    MEDICATIONS  Medications reviewed  NaCl IVF @ 100 mL/hr     ASSESSED NUTRITION NEEDS PER APPROVED PRACTICE GUIDELINES:  Dosing Weight 68 kg - admit wt   Estimated Energy Needs: 0491-0392 kcals (22-25 Kcal/Kg)  Justification: maintenance in paraplegia  Estimated Protein Needs: 68-82 grams protein (1-1.2 g pro/Kg)  Justification: maintenance  Estimated Fluid Needs: 1 mL/kcal  Justification: maintenance    MALNUTRITION:  % Weight Loss:  Weight loss does not meet criteria for malnutrition at this time  % Intake:  Unable to fully assess - pt not alert to provide history  Subcutaneous Fat Loss:  Orbital region moderate depletion  Muscle Loss:  Temporal region moderate depletion and Clavicle bone region moderate depletion  Fluid Retention:  Mild 2+    Malnutrition Diagnosis: Moderate malnutrition  In Context of:  Acute on Chronic illness or injury    NUTRITION DIAGNOSIS:  Inadequate oral intake related to altered mental status as evidenced by negligible PO since arrival.     NUTRITION INTERVENTIONS  Recommendations / Nutrition Prescription  Regular diet  Add Magic Cup w/ meals   Thera vit M    Implementation  Nutrition education: Not appropriate at this time due to patient condition  Medical Food Supplement and " Multivitamin/Mineral: as above    Nutrition Goals  Intake of at least 75% meals BID-TID to show improvement.     MONITORING AND EVALUATION:  Progress towards goals will be monitored and evaluated per protocol and Practice Guidelines    Shira Andrea RD, LD  Pager: [phone]  Weekend Pager: [phone]

## 2024-04-04 NOTE — PROGRESS NOTES
"[facility]    Medicine Progress Note - Hospitalist Service    Date of Admission:  4/3/2024    Assessment & Plan      Jose Lopez is a 63 year old male with PMH paraplegia, neurogenic bowel and bladder, recent UTIs due to self catheterization admitted on 4/3/2024 with suspected toxic encephalopathy due to drug overdose.     Toxic encephalopathy, drug overdose  Acute hypoxic, hypercapneic respiratory failure  Respiratory acidosis  Sinus bradycardia  Found slumped over with \"drug paraphernalia\" around him at his home. Received narcan in ED after his UDS was positive for fentanyl and cannabinoids. Was more agitated after narcan, but didn't really wake up. Received 1L IVF. Was trialed on bipap for 2 hours to see if he would wake up more, but he did not wake up--however HR and BP did improve.  *4/3 CT head: no acute intracranial process  *Of note this is very similar presentation to 9/8/23 hospitalization where he was found down, given narcan and then remained encephalopathic for 20h until he came back around  - continue to monitor mental status  - NS @100ml/hr  - neurochecks q4h  -- wean oxygen as able  - monitor for fever  - sitter at bedside  -Neurology consulted      Coccyx wound and toe abrasions  # Cellulitis  -Consult wound RN for eval  -Continue vanc and zosyn      Elevated troponin, suspect type 2 NSTEMI  - Trop 30, trend once more  - telemetry     T9 Paraplegia (HRC)  Neurogenic bowel  Chronic constipation  - ostomy RN consulted  - await confirmation of current bowel regimen, resume as able     Recent sepsis/UTI due to self cath  Admitted at [facility] 3/20-3/27/24. Enterococcus treated with ampicillin for 7 days, finished treatment on 3/25  4/3 UA not concerning for infection  - continue straight cath q6h        History of Cdiff colitis  -monitor stools  -Will add oral vancomycin for PPX    History of DVT (deep vein thrombosis)  Resume PTA DOAC, seems he recently switched from " xarelto eliquis     Esophagitis  Noted during 3/20 admit at [facility]  - continue famotidine BID           Diet: Combination Diet Regular Diet Adult  Snacks/Supplements Adult: Magic Cup; With Meals    DVT Prophylaxis: Pneumatic Compression Devices  [place] Catheter: Not present  Lines: None     Cardiac Monitoring: ACTIVE order. Indication: Bradycardias (48 hours)  Code Status: Full Code      Clinically Significant Risk Factors Present on Admission               # Drug Induced Coagulation Defect: home medication list includes an anticoagulant medication      # Acute Respiratory Failure: based on blood gas results.  Continue supplemental oxygen as needed      # Moderate Malnutrition: based on nutrition assessment            Disposition Plan      Expected Discharge Date: 04/08/2024      Destination: inpatient rehabilitation facility              Nj Walsh MD  Hospitalist Service  [facility]  Securely message with [employer] (more info)  Text page via [employer] Paging/Directory   ______________________________________________________________________    Interval History   Patient seen and examined at bedside   Continues to be confused.    Discussed with patient nurse.                        Physical Exam   Vital Signs: Temp: 98.7  F (37.1  C) Temp src: Oral BP: 137/73 Pulse: 66   Resp: 16 SpO2: 99 % O2 Device: None (Room air) Oxygen Delivery: 1 LPM  Weight: 149 lbs 4.02 oz    Physical Exam  Cardiovascular:      Rate and Rhythm: Normal rate and regular rhythm.      Heart sounds: Normal heart sounds.   Pulmonary:      Effort: Pulmonary effort is normal. No respiratory distress.   Abdominal:      General: There is no distension.      Palpations: Abdomen is soft.      Tenderness: There is no abdominal tenderness.   Neurological:      Mental Status: He is alert. He is disoriented.      Comments: Not following commands            Medical Decision Making       45 MINUTES SPENT BY ME on the  date of service doing chart review, history, exam, documentation & further activities per the note.      Data     I have personally reviewed the following data over the past 24 hrs:    11.1 (H)  \   10.4 (L)   / 327     144 110 (H) 18.1 /  91   4.1 22 0.51 (L) \     Trop: 33 (H) BNP: N/A     Procal: N/A CRP: N/A Lactic Acid: 1.6         Imaging results reviewed over the past 24 hrs:   No results found for this or any previous visit (from the past 24 hour(s)).

## 2024-04-04 NOTE — PLAN OF CARE
Goal Outcome Evaluation:      Plan of Care Reviewed With: patient    Overall Patient Progress: no changeOverall Patient Progress: no change       Summary: Drug Overdose - Fentanyl     DATE & TIME: 04/03/2024 - 04/04/2024 [id number]     Cognitive Concerns/ Orientation: CHANDNI- pt. Somnolent throughout shift- Unable to conduct Neuro Q4  BEHAVIOR & AGGRESSION TOOL COLOR: Green  ABNL VS/O2: VSS on 1L NC   MOBILITY: Assist x2 lift - turn and repo   PAIN MANAGMENT: No signs of pain this shift   DIET: Regular   BOWEL/BLADDER: Colostomy bag,Intermittent cath Q6- bladder scaned pt. 631- went to straight cath pt. And pt. Had urinated all over bed. Re-scanned pt  ABNL LAB/BG: Trop 33,   DRAIN/DEVICES: R PIV infusing NS @ 100 mL/hr  TELEMETRY RHYTHM: SB  SKIN: scattered bruises and scrapes. Wound on heels- mepilex in place, Mepilex on coccyx for redness. Scattered scrapes on feet & legs- band aids on 2 toes on R foot    TESTS/PROCEDURES: None this shift   D/C DAY/GOALS/PLACE: Pending improvement  Consults: SW & Wound care consulted     OTHER IMPORTANT INFO: Sitter at bedside, pt. Came to floor somnolent, only opened eyes when moved from ED bed to bed in room. Snoring overnight, on 1L of oxygen- pulse ox reading 100% all night. Pt. Has foot drop on both feet and 2+ on BL ankle and feet. Pt. Is a paraplegic     Contact precautions maintained for MRSA & VRE

## 2024-04-04 NOTE — CONSULTS
Care Management Initial Consult    General Information  Assessment completed with: Vasile Rosenberg  Type of CM/SW Visit: Initial Assessment    Primary Care Provider verified and updated as needed: Yes   Readmission within the last 30 days: previous discharge plan unsuccessful      Reason for Consult: discharge planning  Advance Care Planning:            Communication Assessment  Patient's communication style: spoken language (English or Bilingual)             Cognitive  Cognitive/Neuro/Behavioral: .WDL except  Level of Consciousness: confused  Arousal Level: arouses to repeated stimulation (moans)  Orientation: disoriented to, place, time, situation        Speech: garbled    Living Environment:   People in home: friend(s)     Current living Arrangements: house      Able to return to prior arrangements: no       Family/Social Support:  Care provided by: self, friend, homecare agency  Provides care for: no one  Marital Status: Single   (Friends)          Description of Support System: Supportive    Support Assessment: Lacks necessary supervision and assistance    Current Resources:   Patient receiving home care services: No     Community Resources: None  Equipment currently used at home:    Supplies currently used at home:      Employment/Financial:  Employment Status: disabled        Financial Concerns:             Does the patient's insurance plan have a 3 day qualifying hospital stay waiver?  No    Lifestyle & Psychosocial Needs:  Social Determinants of Health     Food Insecurity: Not on file   Depression: Not on file   Housing Stability: Not on file   Tobacco Use: Low Risk  (8/12/2020)    Patient History     Smoking Tobacco Use: Never     Smokeless Tobacco Use: Never     Passive Exposure: Not on file   Financial Resource Strain: Not on file   Alcohol Use: Not on file   Transportation Needs: Not on file   Physical Activity: Not on file   Interpersonal Safety: Not on file   Stress: Not on file   Social Connections:  Not on file       Functional Status:  Prior to admission patient needed assistance:   Dependent ADLs:: Wheelchair-independent, Incontinence  Dependent IADLs:: Shopping, Cleaning, Laundry, Transportation  Assesssment of Functional Status: Not at baseline with ADL Functioning, Not at baseline with mobility, Not at  functional baseline, Needs placement in a SNF/TCF for rehabilitation    Mental Health Status:  Mental Health Status: No Current Concerns       Chemical Dependency Status:  Chemical Dependency Status: Current Concern             Values/Beliefs:  Spiritual, Cultural Beliefs, Hoahaoism Practices, Values that affect care: no               Additional Information:  Received a call from patients friend Vasile  and sister Keke who is in his contact list stating patient is not safe to return to his home at this point . Vasile stated patient has been a friend of his for at least 30 years.  Patient is w/c bound due to a spinal cord injury and has been managing well up until last few years. Patient lives in a [facility] home  Patient is paraplegic and was able to care for himself. Friend noted the last few months patient has been falling, unable to empty his colostomy  unable to self cath and requires assistance,needs increase help with ADLs.   Vasile states patient has a room mate who lives in his house and feels she is not able to help due to her problems with addiction .  Vasile feels patient has been using drugs and when confronted patient denies. Patient does not have a health care agent at this time.  Sister stating she lives in [place] and would be good for patient to live there as patient has not been  caring for himself. Keke states patient does well after going to a TCU and once at home he continues abusing his pain medications.  Patient is open to [facility]  who had called SW on the floor and stated they will not be following patient as they feel patient is not safe to return to his home.   Noted  this is patients 4 th admission since December  Await therapy recommendations once mental status clears     Alissa Benítez RN cC  [phone]

## 2024-04-05 ENCOUNTER — APPOINTMENT (OUTPATIENT)
Dept: ULTRASOUND IMAGING | Facility: CLINIC | Age: 64
DRG: 917 | End: 2024-04-05
Payer: COMMERCIAL

## 2024-04-05 ENCOUNTER — APPOINTMENT (OUTPATIENT)
Dept: GENERAL RADIOLOGY | Facility: CLINIC | Age: 64
DRG: 917 | End: 2024-04-05
Attending: PODIATRIST
Payer: COMMERCIAL

## 2024-04-05 LAB — GLUCOSE BLDC GLUCOMTR-MCNC: 88 MG/DL (ref 70–99)

## 2024-04-05 PROCEDURE — 250N000013 HC RX MED GY IP 250 OP 250 PS 637: Performed by: HOSPITALIST

## 2024-04-05 PROCEDURE — 99221 1ST HOSP IP/OBS SF/LOW 40: CPT

## 2024-04-05 PROCEDURE — 120N000001 HC R&B MED SURG/OB

## 2024-04-05 PROCEDURE — 99232 SBSQ HOSP IP/OBS MODERATE 35: CPT | Performed by: STUDENT IN AN ORGANIZED HEALTH CARE EDUCATION/TRAINING PROGRAM

## 2024-04-05 PROCEDURE — 258N000003 HC RX IP 258 OP 636: Performed by: STUDENT IN AN ORGANIZED HEALTH CARE EDUCATION/TRAINING PROGRAM

## 2024-04-05 PROCEDURE — 93922 UPR/L XTREMITY ART 2 LEVELS: CPT

## 2024-04-05 PROCEDURE — 250N000011 HC RX IP 250 OP 636: Performed by: STUDENT IN AN ORGANIZED HEALTH CARE EDUCATION/TRAINING PROGRAM

## 2024-04-05 PROCEDURE — 250N000011 HC RX IP 250 OP 636: Performed by: HOSPITALIST

## 2024-04-05 PROCEDURE — 250N000013 HC RX MED GY IP 250 OP 250 PS 637: Performed by: PHYSICIAN ASSISTANT

## 2024-04-05 PROCEDURE — 73630 X-RAY EXAM OF FOOT: CPT | Mod: 50

## 2024-04-05 PROCEDURE — 99222 1ST HOSP IP/OBS MODERATE 55: CPT | Performed by: INTERNAL MEDICINE

## 2024-04-05 PROCEDURE — 99221 1ST HOSP IP/OBS SF/LOW 40: CPT | Performed by: PODIATRIST

## 2024-04-05 PROCEDURE — 250N000013 HC RX MED GY IP 250 OP 250 PS 637: Performed by: STUDENT IN AN ORGANIZED HEALTH CARE EDUCATION/TRAINING PROGRAM

## 2024-04-05 PROCEDURE — 250N000013 HC RX MED GY IP 250 OP 250 PS 637: Performed by: CLINICAL NURSE SPECIALIST

## 2024-04-05 PROCEDURE — 99222 1ST HOSP IP/OBS MODERATE 55: CPT

## 2024-04-05 PROCEDURE — 250N000013 HC RX MED GY IP 250 OP 250 PS 637

## 2024-04-05 RX ORDER — BACLOFEN 10 MG/1
5 TABLET ORAL AT BEDTIME
Status: DISCONTINUED | OUTPATIENT
Start: 2024-04-05 | End: 2024-04-08

## 2024-04-05 RX ORDER — NALOXONE HYDROCHLORIDE 0.4 MG/ML
0.2 INJECTION, SOLUTION INTRAMUSCULAR; INTRAVENOUS; SUBCUTANEOUS
Status: DISCONTINUED | OUTPATIENT
Start: 2024-04-05 | End: 2024-04-08 | Stop reason: HOSPADM

## 2024-04-05 RX ORDER — FAMOTIDINE 20 MG/1
20 TABLET, FILM COATED ORAL 2 TIMES DAILY
Status: DISCONTINUED | OUTPATIENT
Start: 2024-04-05 | End: 2024-04-08 | Stop reason: HOSPADM

## 2024-04-05 RX ORDER — ACETAMINOPHEN 325 MG/1
975 TABLET ORAL EVERY 8 HOURS
Status: DISCONTINUED | OUTPATIENT
Start: 2024-04-05 | End: 2024-04-08 | Stop reason: HOSPADM

## 2024-04-05 RX ORDER — NALOXONE HYDROCHLORIDE 0.4 MG/ML
0.4 INJECTION, SOLUTION INTRAMUSCULAR; INTRAVENOUS; SUBCUTANEOUS
Status: DISCONTINUED | OUTPATIENT
Start: 2024-04-05 | End: 2024-04-08 | Stop reason: HOSPADM

## 2024-04-05 RX ORDER — OXYCODONE HYDROCHLORIDE 5 MG/1
5 TABLET ORAL 2 TIMES DAILY PRN
Status: DISCONTINUED | OUTPATIENT
Start: 2024-04-05 | End: 2024-04-08 | Stop reason: HOSPADM

## 2024-04-05 RX ADMIN — ACETAMINOPHEN 650 MG: 325 TABLET, FILM COATED ORAL at 13:55

## 2024-04-05 RX ADMIN — FAMOTIDINE 20 MG: 10 INJECTION, SOLUTION INTRAVENOUS at 00:27

## 2024-04-05 RX ADMIN — APIXABAN 5 MG: 5 TABLET, FILM COATED ORAL at 20:10

## 2024-04-05 RX ADMIN — OXYCODONE HYDROCHLORIDE 5 MG: 5 TABLET ORAL at 18:08

## 2024-04-05 RX ADMIN — OXYCODONE HYDROCHLORIDE 5 MG: 5 TABLET ORAL at 09:34

## 2024-04-05 RX ADMIN — PIPERACILLIN AND TAZOBACTAM 4.5 G: 4; .5 INJECTION, POWDER, FOR SOLUTION INTRAVENOUS at 00:27

## 2024-04-05 RX ADMIN — VANCOMYCIN HYDROCHLORIDE 1250 MG: 10 INJECTION, POWDER, LYOPHILIZED, FOR SOLUTION INTRAVENOUS at 09:33

## 2024-04-05 RX ADMIN — PIPERACILLIN AND TAZOBACTAM 4.5 G: 4; .5 INJECTION, POWDER, FOR SOLUTION INTRAVENOUS at 13:40

## 2024-04-05 RX ADMIN — DOCUSATE SODIUM 50 MG AND SENNOSIDES 8.6 MG 2 TABLET: 8.6; 5 TABLET, FILM COATED ORAL at 20:10

## 2024-04-05 RX ADMIN — BACLOFEN 5 MG: 10 TABLET ORAL at 22:16

## 2024-04-05 RX ADMIN — ACETAMINOPHEN 975 MG: 325 TABLET, FILM COATED ORAL at 16:06

## 2024-04-05 RX ADMIN — FAMOTIDINE 20 MG: 10 INJECTION, SOLUTION INTRAVENOUS at 13:38

## 2024-04-05 RX ADMIN — VANCOMYCIN HYDROCHLORIDE 125 MG: 125 CAPSULE ORAL at 20:10

## 2024-04-05 RX ADMIN — OXYCODONE HYDROCHLORIDE 2.5 MG: 5 TABLET ORAL at 05:58

## 2024-04-05 RX ADMIN — ACETAMINOPHEN 975 MG: 325 TABLET, FILM COATED ORAL at 22:16

## 2024-04-05 RX ADMIN — PIPERACILLIN AND TAZOBACTAM 4.5 G: 4; .5 INJECTION, POWDER, FOR SOLUTION INTRAVENOUS at 05:57

## 2024-04-05 RX ADMIN — Medication 1 TABLET: at 08:26

## 2024-04-05 RX ADMIN — VANCOMYCIN HYDROCHLORIDE 125 MG: 125 CAPSULE ORAL at 08:27

## 2024-04-05 RX ADMIN — OXYCODONE HYDROCHLORIDE 5 MG: 5 TABLET ORAL at 13:34

## 2024-04-05 RX ADMIN — APIXABAN 5 MG: 5 TABLET, FILM COATED ORAL at 08:26

## 2024-04-05 RX ADMIN — OXYCODONE HYDROCHLORIDE 2.5 MG: 5 TABLET ORAL at 02:02

## 2024-04-05 RX ADMIN — CEPHALEXIN 250 MG: 250 CAPSULE ORAL at 18:08

## 2024-04-05 ASSESSMENT — ACTIVITIES OF DAILY LIVING (ADL)
ADLS_ACUITY_SCORE: 52
ADLS_ACUITY_SCORE: 51
ADLS_ACUITY_SCORE: 52

## 2024-04-05 NOTE — CONSULTS
ASSESSMENT/PLAN:  63 year old male admitted for suspected toxic encephalopathy with wounds of toes and heel of unknown length and origin.    I will order a set of foot xrays as the last set done was over 4 months ago and he relates new injury/sores since that time.  Antibiotics per hospital team recommendations - Zosyn/[place]  [facility] nurse has already been consulted, wound care per their recommendations, appreciate input.  Vascular has been consulted.  MARIELENA's done today and reveal a normal exam  Will continue to follow      PATIENT HISTORY:  Jose Lopez is a 63 year old male who was admitted for suspected toxic encephalopathy due to drug overdose on 4/3/24 after being found slumped forward and unresponsive by his home physical therapist.  He has a past medical history for paraplegia, neurogenic bowel and bladder, recent UTIs due to self catheterization admitted on 4/3/2024 with suspected toxic encephalopathy due to drug overdose.    I was asked to see Jose Lopez  by , Nj Bobby MD for evaluation and treatment options for foot wounds.  He relates that these wounds started when an aide bumped/dragged his toes on the ground when he was in his wheelchair being moved.  Thinks this initially happened a couple of months ago, the wounds were healing and then the toes got caught again and were dragged on the carpet.     Chart review shows foot xrays were taken in December of 2023 with indication of trauma and no acute findings noted.    Review of Systems:  Patient denies fever, chills, rash,. stiffness, heart burn, blood in stool, chest pain with activity, shortness of breath with activity, chronic cough, easy bleeding/bruising, swelling of ankles, excessive thirst, fatigue. Patient admits to pain at his osteomy site with gas accumulation at times.  Sores on the toes that have almost healed in the past.       PAST MEDICAL HISTORY:   Past Medical History:   Diagnosis Date    Benzodiazepine dependence (H)      Muscle spasticity     Neurogenic bladder     Neurogenic bowel     Paraplegia (H)     Sepsis (H)     from UTI    Spinal cord injury at T9 level (H) 1994    per chart review    Substance abuse (H)     UTI (urinary tract infection)         PAST SURGICAL HISTORY:   Past Surgical History:   Procedure Laterality Date    BACK SURGERY      CHOLECYSTECTOMY      EXAM UNDER ANESTHESIA RECTUM N/A 5/1/2019    Procedure: EXAM UNDER ANESTHESIA, RECTUM;  Surgeon: Tiburcio Barros MD;  Location: [facility] OR    FISTULOTOMY RECTUM N/A 5/1/2019    Procedure: FISTULOTOMY AND REMOVAL OF ANAL SKIN TAGS;  Surgeon: Tiburcio Barros MD;  Location:  OR    ORTHOPEDIC SURGERY          MEDICATIONS:   Current Facility-Administered Medications:     acetaminophen (TYLENOL) tablet 650 mg, 650 mg, Oral, Q4H PRN, 650 mg at 04/04/24 2019 **OR** acetaminophen (TYLENOL) Suppository 650 mg, 650 mg, Rectal, Q4H PRN, Rachelle Chacko DO    apixaban ANTICOAGULANT (ELIQUIS) tablet 5 mg, 5 mg, Oral, BID, Rachelle Chacko DO, 5 mg at 04/05/24 0826    calcium carbonate (TUMS) chewable tablet 1,000 mg, 1,000 mg, Oral, 4x Daily PRN, Rachelle Chacko DO    famotidine (PEPCID) injection 20 mg, 20 mg, Intravenous, Q12H, Rachelle Chacko DO, 20 mg at 04/05/24 0027    lidocaine (LMX4) cream, , Topical, Q1H PRN, Rachelle Chacok DO    lidocaine 1 % 0.1-1 mL, 0.1-1 mL, Other, Q1H PRN, Rachelle Chacko DO    multivitamin w/minerals (THERA-VIT-M) tablet 1 tablet, 1 tablet, Oral, Daily, Nj Walsh MD, 1 tablet at 04/05/24 0826    naloxone (NARCAN) injection 0.2 mg, 0.2 mg, Intravenous, Q2 Min PRN **OR** naloxone (NARCAN) injection 0.4 mg, 0.4 mg, Intravenous, Q2 Min PRN **OR** naloxone (NARCAN) injection 0.2 mg, 0.2 mg, Intramuscular, Q2 Min PRN **OR** naloxone (NARCAN) injection 0.4 mg, 0.4 mg, Intramuscular, Q2 Min PRN, Nj Walsh MD    ondansetron (ZOFRAN ODT) ODT tab 4 mg, 4  mg, Oral, Q6H PRN **OR** ondansetron (ZOFRAN) injection 4 mg, 4 mg, Intravenous, Q6H PRN, Rachelle Chacko, DO    oxyCODONE (ROXICODONE) tablet 5 mg, 5 mg, Oral, Q4H PRN, GreenRachelle, DO, 5 mg at 04/05/24 0934    oxyCODONE IR (ROXICODONE) half-tab 2.5 mg, 2.5 mg, Oral, Q4H PRN, Rachelle Chacko, DO, 2.5 mg at 04/05/24 0558    piperacillin-tazobactam (ZOSYN) 4.5 g vial to attach to  mL bag, 4.5 g, Intravenous, Q6H, Nj Walsh MD, 4.5 g at 04/05/24 0557    senna-docusate (SENOKOT-S/PERICOLACE) 8.6-50 MG per tablet 1 tablet, 1 tablet, Oral, BID PRN **OR** senna-docusate (SENOKOT-S/PERICOLACE) 8.6-50 MG per tablet 2 tablet, 2 tablet, Oral, BID PRN, Rachelle Chacko, DO    sodium chloride (PF) 0.9% PF flush 3 mL, 3 mL, Intracatheter, Q8H, Rachelle Chacko, DO, 3 mL at 04/05/24 0558    sodium chloride (PF) 0.9% PF flush 3 mL, 3 mL, Intracatheter, q1 min prn, Rachelle Chacko, DO    vancomycin (VANCOCIN) 1,250 mg in 0.9% NaCl 250 mL intermittent infusion, 1,250 mg, Intravenous, Q12H, Nj Walsh MD, 1,250 mg at 04/05/24 0933    vancomycin (VANCOCIN) capsule 125 mg, 125 mg, Oral, BID, Nj Walsh MD, 125 mg at 04/05/24 0827     ALLERGIES:    Allergies   Allergen Reactions    Sertraline Other (See Comments)     Other reaction(s): Gastrointestinal  Other reaction(s): Gastrointestinal        SOCIAL HISTORY:   Social History     Socioeconomic History    Marital status: Single     Spouse name: Not on file    Number of children: Not on file    Years of education: Not on file    Highest education level: Not on file   Occupational History    Not on file   Tobacco Use    Smoking status: Never    Smokeless tobacco: Never   Substance and Sexual Activity    Alcohol use: No    Drug use: No    Sexual activity: Not on file   Other Topics Concern    Parent/sibling w/ CABG, MI or angioplasty before 65F 55M? Not Asked   Social  "History Narrative    Not on file     Social Determinants of Health     Financial Resource Strain: Not on file   Food Insecurity: Not on file   Transportation Needs: Not on file   Physical Activity: Not on file   Stress: Not on file   Social Connections: Not on file   Interpersonal Safety: Not on file   Housing Stability: Not on file        FAMILY HISTORY: No family history on file.     EXAM:Vitals: /68 (BP Location: Right arm, Patient Position: Semi-Persaud's, Cuff Size: Adult Regular)   Pulse 74   Temp 98.2  F (36.8  C) (Oral)   Resp 20   Ht 1.778 m (5' 10\")   Wt 67.7 kg (149 lb 4 oz)   SpO2 99%   BMI 21.42 kg/m    BMI= Body mass index is 21.42 kg/m .    LABS:    WBC   Date Value Ref Range Status   12/05/2018 8.6 4.0 - 11.0 10e9/L Final     WBC Count   Date Value Ref Range Status   04/04/2024 11.1 (H) 4.0 - 11.0 10e3/uL Final        General appearance: Patient is alert and fully cooperative with history & exam.  No sign of distress is noted during the visit.      Psychiatric: Affect is pleasant & appropriate.  Patient appears motivated to improve health.       Respiratory: Breathing is regular & unlabored while sitting.      HEENT: Hearing is intact to spoken word.  Speech is clear.  No gross evidence of visual impairment.     Dermatologic:Integument is atrophic and shiny.  Right heel with approx 1.0 x 0.5cm stage II type of ulceration.  No malodor, no probing or undermining.  Right toes, multiple areas of excoriation type lesions.  Ulcer noted dorsal 2nd digit, eschar.  Left, 4th and 5th digits with necrotic type appearing blood blister lesions.  Slight serosanguinous drainage.  No malodor noted.  No probe to bone     Vascular: DP & PT pulses are intact & regular bilaterally.  No significant edema or varicosities noted.  CFT and skin temperature is normal to both lower extremities.      Neurologic: Lower extremity sensation is absent to light touch.  Lower extremity weakness and atrophy noted, " paraplegia     Musculoskeletal: Patient is wheelchair bound, paraplegia.  No ability to dorsiflex/plantarflex the foot.  No significant foot contractures noted with the exception of global hammertoe deformity.      Exam Information    Exam Date Exam Time Accession # Performing Department Results    4/5/24  9:31 AM [id number] [facility]      PACS Images     Show images for US MARIELENA Doppler No Exercise     Study Result    Narrative & Impression   ULTRASOUND MARIELENA DOPPLER NO EXERCISE, 1-2 LEVELS, BILATERAL April 5, 2024 9:31 AM      HISTORY: Toe pressures.     COMPARISON: None.     FINDINGS:  Right MARIELENA:   PT: 148, index of 1.12.  DP: 142, index of 1.08.     Left MARIELENA:   PT: 136, index of 1.03.  DP: 146, index of 1.11.      Right Digital brachial index: 148, index of 1.12.  Left Digital Brachial index: 100, index of 0.76.     Waveforms: Distal posterior tibial and dorsalis pedis waveforms are  intact. Digital waveforms intact.                                                                      IMPRESSION: Normal MARIELENA examination.     MARIELENA CRITERIA:  >1.4 NC  0.95-1.4 Normal  0.90 - 0.94 Mild  0.5 - 0.89 Moderate  0.2 - 0.49 Severe  <0.2 Critical     MD Liz GARCÍA DPM    10:15 AM

## 2024-04-05 NOTE — CONSULTS
[facility]    Infectious Disease Consultation     Date of Admission:  4/3/2024  Date of Consult (When I saw the patient): 04/05/24    Assessment & Plan   Jose Lopez is a 63 year old who was admitted on 4/3/2024.     Impression:  62 yo male with hx of T9 spinal cord injury with resultant paraplegia, neurogenic bowel, and neurogenic bladder with recurrent UTI from straight catheterization presenting after being found slumped over in his chair from drug overdose/toxic encephalopathy.     -UA with only 2 WBC. Urine culture with VRE.This likely represents colonization.   -Blood cultures no growth to date.   -Afebrile with WBC 11.1.   -Multiple wounds and abrasions. X-ray of feet with no evidence of osteomyelitis. No significant cellulitis.   -History of c.diff colitis in 2022 and 2023. On oral vancomycin prophylactic. Stools in ostomy bag are very firm and painful for him.  -On IV Zosyn and Vancomycin      Recommendations:   Stop Zosyn and Vancomycin.   Start Keflex and continue for 4 days for mild cellulitis.   Continue oral Vancomycin given history of c.diff and presently on IV antibiotics for 10 more days.  Following blood cultures.   [facility] already following for wound care.       Desi Byrd PA-C    Reason for Consult   Reason for consult: Asked to evaluate this patient for VRE in urine and soft tissue infection.    Primary Care Physician   Snug Hollis    Chief Complaint   Encephalopathy, drug overdose.     History is obtained from the patient and medical records    History of Present Illness   Jose Lopez is a 63 year old male with history of T9 spinal cord injury and resultant paraplegia with neurogenic bowel and bladder and recurrent UTI from self-catheterization who presented to the hospital on 4/3/24 with suspected toxic encephalopathy/drug overdose after being found slumped in chair. He was given narcan.     He was recently diagnosed with UTI 3/13/24 by Urology and started  on bactrim. This later resulted as enterococcus and Ampicillin was ordered to start 3/18/24 [facility] was then [facility], found covered in urine. He met sepsis criteria, WBC >23k, normal lactate, CT w/ bilateral hydro and bladder wall thickening w/ hyperdense material within base. He was initially on IV Unasyn and then transitioned to Ampicillin and then ultimately Amoxicillin for 10 days total of treatment. He still had 2 days of Amoxicillin left prior to hospitalization.     He also has wounds/abrasions on his toes and legs from dragging his feet on the ground. He denies fever, chills. WBC is normal.     Past Medical History   I have reviewed this patient's medical history and updated it with pertinent information if needed.   Past Medical History:   Diagnosis Date    Benzodiazepine dependence (H)     Muscle spasticity     Neurogenic bladder     Neurogenic bowel     Paraplegia (H)     Sepsis (H)     from UTI    Spinal cord injury at T9 level (H) 1994    per chart review    Substance abuse (H)     UTI (urinary tract infection)        Past Surgical History   I have reviewed this patient's surgical history and updated it with pertinent information if needed.  Past Surgical History:   Procedure Laterality Date    BACK SURGERY      CHOLECYSTECTOMY      EXAM UNDER ANESTHESIA RECTUM N/A 5/1/2019    Procedure: EXAM UNDER ANESTHESIA, RECTUM;  Surgeon: Tiburcio Barros MD;  Location: [facility] OR    FISTULOTOMY RECTUM N/A 5/1/2019    Procedure: FISTULOTOMY AND REMOVAL OF ANAL SKIN TAGS;  Surgeon: Tiburcio Barros MD;  Location:  OR    ORTHOPEDIC SURGERY         Prior to Admission Medications   Prior to Admission Medications   Prescriptions Last Dose Informant Patient Reported? Taking?   DULoxetine (CYMBALTA) 60 MG capsule   No No   Sig: Take 1 capsule (60 mg) by mouth daily   HYDROcodone-acetaminophen (NORCO)  MG per tablet   Yes Yes   Sig: Take 1 tablet by mouth every 4 hours as needed for  severe pain   apixaban ANTICOAGULANT (ELIQUIS) 5 MG tablet   No No   Sig: Take 1 tablet (5 mg) by mouth 2 times daily   baclofen (LIORESAL) 20 MG tablet   Yes Yes   baclofen (LIORESAL) 5 mg TABS half-tab   Yes Yes   cyclobenzaprine (FLEXMID) 7.5 MG tablet   No No   Sig: Take 1 tablet (7.5 mg) by mouth 3 times daily as needed for muscle spasms   ferrous sulfate (FEROSUL) 325 (65 Fe) MG tablet   Yes No   Sig: Take 325 mg by mouth daily (with breakfast)   folic acid (FOLVITE) 1 MG tablet   Yes No   Sig: Take 1 mg by mouth daily   gabapentin (NEURONTIN) 100 MG capsule   Yes Yes   hydrOXYzine HCl (ATARAX) 25 MG tablet   Yes Yes   Sig: Take 25 mg by mouth every 8 hours as needed   naloxone (NARCAN) 4 MG/0.1ML nasal spray   Yes No   Sig: Spray 4 mg into one nostril alternating nostrils as needed for opioid reversal every 2-3 minutes until assistance arrives   oxyCODONE (ROXICODONE) 5 MG tablet   Yes No   Sig: Take 15 mg by mouth 3 times daily   oxybutynin (DITROPAN) 5 MG tablet   Yes No   Sig: Take 5 mg by mouth 3 times daily   polyethylene glycol (MIRALAX) 17 GM/Dose powder   No No   Sig: Take 17 g by mouth daily as needed for constipation   Patient taking differently: Take 17 g by mouth daily   pregabalin (LYRICA) 100 MG capsule   No No   Sig: Take 1 capsule (100 mg) by mouth 2 times daily   rivaroxaban ANTICOAGULANT (XARELTO) 20 MG TABS tablet   Yes Yes   senna (SENOKOT) 8.6 MG tablet   Yes No   Sig: Take 1 tablet by mouth 2 times daily   senna-docusate (SENOKOT-S/PERICOLACE) 8.6-50 MG tablet   No No   Sig: Take 1 tablet by mouth 2 times daily   Patient taking differently: Take 1 tablet by mouth 2 times daily as needed   simethicone (MYLICON) 125 MG chewable tablet   Yes No   Sig: Take 125 mg by mouth 3 times daily as needed   sulfamethoxazole-trimethoprim (BACTRIM DS) 800-160 MG tablet   No No   Sig: Take 1 tablet by mouth 2 times daily   vancomycin (VANCOCIN) 125 MG capsule   No No   Sig: Take 1 capsule (125 mg) by  mouth 2 times daily      Facility-Administered Medications: None     Allergies   Allergies   Allergen Reactions    Sertraline Other (See Comments)     Other reaction(s): Gastrointestinal  Other reaction(s): Gastrointestinal       Immunization History   Immunization History   Administered Date(s) Administered    COVID-19 MONOVALENT 12+ (Pfizer) 04/28/2021, 05/19/2021    COVID-19 Monovalent 18+ (Moderna) 12/20/2021    Tdap (Adult) Unspecified Formulation 06/19/2017       Social History   I have reviewed this patient's social history and updated it with pertinent information if needed. Jose Lopez  reports that he has never smoked. He has never used smokeless tobacco. He reports that he does not drink alcohol and does not use drugs.    Family History   I have reviewed this patient's family history and updated it with pertinent information if needed.   No family history on file.    Review of Systems   The 10 point Review of Systems is negative    Physical Exam   Temp: 98.2  F (36.8  C) Temp src: Oral BP: 124/68 Pulse: 74   Resp: 20 SpO2: 99 % O2 Device: None (Room air)    Vital Signs with Ranges  Temp:  [98.2  F (36.8  C)-98.7  F (37.1  C)] 98.2  F (36.8  C)  Pulse:  [66-74] 74  Resp:  [16-21] 20  BP: (119-137)/(68-73) 124/68  SpO2:  [99 %] 99 %  149 lbs 4.02 oz  Body mass index is 21.42 kg/m .    GENERAL APPEARANCE:  awake  EYES: Eyes grossly normal to inspection  NECK: no adenopathy  RESP: lungs clear   CV: regular rates and rhythm  ABDOMEN: soft, non-tender. Has colostomy with hard stool in it.   MS: Paraplegia with atrophy of legs.   SKIN: Multiple abrasions on his legs and toes. No surrounding cellulitis.         Data   All laboratory data reviewed  Component      Latest Ref Rng 4/3/2024  3:14 PM 4/4/2024  9:11 AM   WBC      4.0 - 11.0 10e3/uL 11.1 (H)  11.1 (H)    RBC Count      4.40 - 5.90 10e6/uL 4.76  4.33 (L)    Hemoglobin      13.3 - 17.7 g/dL 11.5 (L)  10.4 (L)    Hematocrit      40.0 - 53.0 % 37.3 (L)   33.7 (L)    MCV      78 - 100 fL 78  78    MCH      26.5 - 33.0 pg 24.2 (L)  24.0 (L)    MCHC      31.5 - 36.5 g/dL 30.8 (L)  30.9 (L)    RDW      10.0 - 15.0 % 17.9 (H)  18.2 (H)    Platelet Count      150 - 450 10e3/uL 412  327    % Neutrophils      % 69     % Lymphocytes      % 17     % Monocytes      % 7     % Eosinophils      % 6     % Basophils      % 1     % Immature Granulocytes      % 0     NRBCs per 100 WBC      <1 /100 0     Absolute Neutrophils      1.6 - 8.3 10e3/uL 7.8     Absolute Lymphocytes      0.8 - 5.3 10e3/uL 1.9     Absolute Monocytes      0.0 - 1.3 10e3/uL 0.7     Absolute Eosinophils      0.0 - 0.7 10e3/uL 0.6     Absolute Basophils      0.0 - 0.2 10e3/uL 0.1     Absolute Immature Granulocytes      <=0.4 10e3/uL 0.0     Absolute NRBCs      10e3/uL 0.0        Component      Latest Ref [facility] 4/3/2024  3:14 PM 4/4/2024  9:11 AM   Sodium      135 - 145 mmol/L 139  144    Potassium      3.4 - 5.3 mmol/L 4.2  4.1    Carbon Dioxide (CO2)      22 - 29 mmol/L 23  22    Anion Gap      7 - 15 mmol/L 16 (H)  12    Urea Nitrogen      8.0 - 23.0 mg/dL 33.2 (H)  18.1    Creatinine      0.67 - 1.17 mg/dL 0.72  0.51 (L)    GFR Estimate      >60 mL/min/1.73m2 >90  >90    Calcium      8.8 - 10.2 mg/dL 9.4  8.9    Chloride      98 - 107 mmol/L 100  110 (H)    Glucose      70 - 99 mg/dL 102 (H)  91    Alkaline Phosphatase      40 - 150 U/L 106     AST      0 - 45 U/L 18     ALT      0 - 70 U/L 15     Protein Total      6.4 - 8.3 g/dL 7.9     Albumin      3.5 - 5.2 g/dL 4.2     Bilirubin Total      <=1.2 mg/dL <0.2        Component      Latest Ref [facility] 4/3/2024  3:23 PM   Color Urine      Colorless, Straw, Light Yellow, Yellow  Light Yellow    Appearance Urine      Clear  Clear    Glucose Urine      Negative mg/dL Negative    Bilirubin Urine      Negative  Negative    Ketones Urine      Negative mg/dL Negative    Specific Gravity Urine      1.003 - 1.035  1.025    Blood Urine      Negative  Negative    pH Urine       5.0 - 7.0  6.5    Protein Albumin Urine      Negative mg/dL Negative    Urobilinogen mg/dL      Normal, 2.0 mg/dL Normal    Nitrite Urine      Negative  Negative    Leukocyte Esterase Urine      Negative  Negative    Bacteria Urine      None Seen /HPF Moderate !    Mucus Urine      None Seen /LPF Present !    RBC Urine      <=2 /HPF 5 (H)    WBC Urine      <=5 /HPF 2              04/03/2024 1714 04/04/2024 1916 Blood Culture Peripheral Blood [[id number]]   Peripheral Blood    Preliminary result Component Value   Culture No growth after 1 day P             04/03/2024 1647 04/04/2024 1916 Blood Culture Peripheral Blood [[id number]]   Peripheral Blood    Preliminary result Component Value   Culture No growth after 1 day P             04/03/2024 1523 04/05/2024 0237 Urine Culture [[id number]]    (Abnormal)   Urine, Catheter    Preliminary result Component Value   Culture 50,000-100,000 CFU/mL Enterococcus faecium VRE Abnormal  P       Susceptibility     Enterococcus faecium VRE     LEONEL (Preliminary)     Ampicillin >=32 ug/mL Resistant     Linezolid 2 ug/mL Susceptible     Nitrofurantoin 64 ug/mL Intermediate     Vancomycin >=32 ug/mL Resistant

## 2024-04-05 NOTE — PHARMACY-VANCOMYCIN DOSING SERVICE
"Pharmacy Vancomycin Initial Note  Date of Service 2024  Patient's  1960  63 year old, male    Indication: Sepsis and Skin and Soft Tissue Infection    Current estimated CrCl = Estimated Creatinine Clearance: 145.7 mL/min (A) (based on SCr of 0.51 mg/dL (L)).    Creatinine for last 3 days  4/3/2024:  3:14 PM Creatinine 0.72 mg/dL  2024:  9:11 AM Creatinine 0.51 mg/dL    Recent Vancomycin Level(s) for last 3 days  No results found for requested labs within last 3 days.      Vancomycin IV Administrations (past 72 hours)                     vancomycin (VANCOCIN) 2,000 mg in 0.9% NaCl 500 mL intermittent infusion (mg) 2,000 mg New Bag 24 174                    Nephrotoxins and other renal medications (From now, onward)      Start     Dose/Rate Route Frequency Ordered Stop    24  vancomycin (VANCOCIN) capsule 125 mg         125 mg Oral 2 TIMES DAILY 24 17024  vancomycin (VANCOCIN) 1,250 mg in 0.9% NaCl 250 mL intermittent infusion         1,250 mg  over 90 Minutes Intravenous EVERY 12 HOURS 24 1200  piperacillin-tazobactam (ZOSYN) 4.5 g vial to attach to  mL bag        Note to Pharmacy: For [facility], [facility] and [facility]: For Zosyn-naive patients, use the \"Zosyn initial dose + extended infusion\" order panel.    4.5 g  over 30 Minutes Intravenous EVERY 6 HOURS 24 1134              Contrast Orders - past 72 hours (72h ago, onward)      None            [employer] Prediction of Planned Initial Vancomycin Regimen  Loading dose: 2gram x 1 ( 4/3 )   Regimen: 1250 mg IV every 12 hours.  Start time: 20:01 on 2024  Exposure target: AUC24 (range)400-600 mg/L.hr   AUC24,ss: 477 mg/L.hr  Probability of AUC24 > 400: 68 %  Ctrough,ss: 13.2 mg/L  Probability of Ctrough,ss > 20: 22 %  Probability of nephrotoxicity (Lodise JOVANNA ): 8 %          Plan:  Start vancomycin  1250 mg IV q12h.   Vancomycin monitoring method: AUC  Vancomycin therapeutic " monitoring goal: 400-600 mg*h/L  Pharmacy will check vancomycin levels as appropriate in 1-3 Days.    Serum creatinine levels will be ordered daily for the first week of therapy and at least twice weekly for subsequent weeks.      Zoila Ely RPH

## 2024-04-05 NOTE — PROGRESS NOTES
"[facility]    Medicine Progress Note - Hospitalist Service    Date of Admission:  4/3/2024    Assessment & Plan   Jose Lopez is a 63 year old male with PMH paraplegia, neurogenic bowel and bladder, recent UTIs due to self catheterization admitted on 4/3/2024 with suspected toxic encephalopathy due to drug overdose.     Toxic encephalopathy, drug overdose Resolved  Acute hypoxic, hypercapneic respiratory failure Resolved  Respiratory acidosis  Sinus bradycardia  # Chronic opiate use  Found slumped over with \"drug paraphernalia\" around him at his home. Received narcan in ED after his UDS was positive for fentanyl and cannabinoids. Was more agitated after narcan, but didn't really wake up. Received 1L IVF. Was trialed on bipap for 2 hours to see if he would wake up more, but he did not wake up--however HR and BP did improve.  *4/3 CT head: no acute intracranial process  *Of note this is very similar presentation to 9/8/23 hospitalization where he was found down, given narcan and then remained encephalopathic for 20h until he came back around    Patient's mental status currently at his baseline.  Neurology consult appreciated and neurology has signed off and the patient  -Pain management consulted and pain management added Tylenol 975 mg every 8 hours  vistaril 25 mg every 6 hours prn .  5 mg baclofen at night  -As per pain management can increase baclofen for 35 mg 3 times daily if mental status improves  -Oxycodone 2.5 to 5 mg decreased to twice daily as needed by pain management and does not recommend increasing dose           Coccyx wound and toe abrasions  # Cellulitis  # Toe wounds  -Consult wound RN for eval  -Infectious consult appreciated on Zosyn and vancomycin stoppedd  - Keflex for mild cellulitis started with infectious disease  -Follow blood cultures  -Wound care following   -Podiatry consulted and ordered foot x-rays  -Vascular surgery has been consulted and ABIs done today " revealed normal exam  -Vascular surgery recommending no surgical intervention.      Elevated troponin, suspect type 2 NSTEMI  -Troponins have been flat with no anginal pain and this is likely due to demand  T9 Paraplegia ([facility])  Neurogenic bowel  Chronic constipation  - ostomy RN consulted  - await confirmation of current bowel regimen, resume as able     Recent sepsis/UTI due to self cath  Admitted at [facility] 3/20-3/27/24. Enterococcus treated with ampicillin for 7 days, finished treatment on 3/25  4/3 UA not concerning for infection  - continue straight cath q6h  -Infectious disease consulted and recommended only Keflex        History of Cdiff colitis  -monitor stools  -Will add oral vancomycin for PPX    History of DVT (deep vein thrombosis)  Resume PTA DOAC, seems he recently switched from xarelto eliquis     Esophagitis  Noted during 3/[facility] admit [facility]  - continue famotidine BID             Diet: Combination Diet Regular Diet Adult  Snacks/Supplements Adult: Magic Cup; With Meals    DVT Prophylaxis: DOAC  [place] Catheter: Not present  Lines: None     Cardiac Monitoring: ACTIVE order. Indication: Bradycardias (48 hours)  Code Status: Full Code      Clinically Significant Risk Factors                          # Moderate Malnutrition: based on nutrition assessment, PRESENT ON ADMISSION          Disposition Plan      Expected Discharge Date: 04/08/2024      Destination: inpatient rehabilitation facility          Discharge likely in the next 24 to 48 hours.    Nj Walsh MD  Hospitalist Service  [facility]  Securely message with Z[employer] (more info)  Text page via [employer] Paging/Directory   ______________________________________________________________________    Interval History   Patient seen and seen and examined at bedside.  Patient is alert and oriented x 3 and currently at his baseline    Patient is requesting to increase his pain meds and complaining of  pain his lower extremities and back     Physical Exam   Vital Signs: Temp: 98.2  F (36.8  C) Temp src: Oral BP: 124/68 Pulse: 74   Resp: 20 SpO2: 99 % O2 Device: None (Room air)    Weight: 149 lbs 4.02 oz    Physical Exam  Cardiovascular:      Rate and Rhythm: Normal rate and regular rhythm.      Heart sounds: Normal heart sounds.   Pulmonary:      Effort: Pulmonary effort is normal. No respiratory distress.   Abdominal:      General: There is no distension.      Palpations: Abdomen is soft.      Tenderness: There is no abdominal tenderness.   Skin:     Comments: Bilateral Foot wounds   Neurological:      Mental Status: He is alert and oriented to person, place, and time.          Medical Decision Making       45 MINUTES SPENT BY ME on the date of service doing chart review, history, exam, documentation & further activities per the note.      Data         Imaging results reviewed over the past 24 hrs:   Recent Results (from the past 24 hour(s))   US MARIELENA Doppler No Exercise    Narrative    ULTRASOUND MARIELENA DOPPLER NO EXERCISE, 1-2 LEVELS, BILATERAL April 5, 2024 9:31 AM     HISTORY: Toe pressures.    COMPARISON: None.    FINDINGS:  Right MARIELENA:   PT: 148, index of 1.12.  DP: 142, index of 1.08.    Left MARIELENA:   PT: 136, index of 1.03.  DP: 146, index of 1.11.     Right Digital brachial index: 148, index of 1.12.  Left Digital Brachial index: 100, index of 0.76.    Waveforms: Distal posterior tibial and dorsalis pedis waveforms are  intact. Digital waveforms intact.      Impression    IMPRESSION: Normal MARIELENA examination.    MARIELENA CRITERIA:  >1.4 NC  0.95-1.4 Normal  0.90 - 0.94 Mild  0.5 - 0.89 Moderate  0.2 - 0.49 Severe  <0.2 Critical    GIANNA MILTON MD         SYSTEM ID:  [id number]   XR Foot Bilateral G/E 3 Views    Narrative    EXAM: XR FOOT BILATERAL G/E 3 VIEWS  DATE/TIME: 4/5/2024 1:11 PM    INDICATION: ulcer to heel, sore/infection to toes.  unknown if trauma  COMPARISON: None available.       Impression     IMPRESSION: Diffuse osseous demineralization. Mild degenerative  arthrosis involving multiple joints of the midfoot and forefoot  bilaterally. No acute fracture or evidence of osteomyelitis. Partial  visualization of an intramedullary luis manuel within the left tibia.    STELLA CLEMENTS DO         SYSTEM ID:  [id number]

## 2024-04-05 NOTE — PLAN OF CARE
Goal Outcome Evaluation:      Plan of Care Reviewed With: patient    Overall Patient Progress: improvingOverall Patient Progress: improving       Summary: Drug Overdose - Fentanyl and opioids; 4th admission since December    DATE & TIME: 04/04/2024- 04/05/2024 0325-7801     Cognitive Concerns/ Orientation: A & O x2- disoriented to time and Situation   BEHAVIOR & AGGRESSION TOOL COLOR: Green  ABNL VS/O2: VSS on RA  MOBILITY: Assist x2; lift; turned and repositioned Q2hrs and prn; paraplegic   PAIN MANAGMENT: Patient c/o generalized pain - Tylenol given x1, Oxy given x3   DIET: Regular  BOWEL/BLADDER: Colostomy bag; Intermittent cath Q6; BUS showed 957mL, able to get 800 output from 1st straight cath. Bladder scanned pt. 2nd time for 501 mL- was able to straight cath pt. And get 400 output  ABNL LAB/BG: Cr 0.51; WBC 11.1; Hgb 10.4  DRAIN/DEVICES: R PIV SL- receiving intermittent antibiotics   TELEMETRY RHYTHM: NSR  SKIN: Scattered bruises and scrapes. Wound on heels- mepilex in place, Mepilex on coccyx for redness. Scattered scrapes on feet- band aids on 2 toes on R foot   TESTS/PROCEDURES: None this shift   D/C DAY/GOALS/PLACE: Pending improvement - CC following    OTHER IMPORTANT INFO: Sitter at bedside. Contact precautions maintained for MRSA & VRE. Neurology/WOC following.

## 2024-04-05 NOTE — PLAN OF CARE
Summary: Drug Overdose - Fentanyl and opioids; 4th admission since December    DATE & TIME: 4/5/24 5748-8585     Cognitive Concerns/ Orientation: A&O x2- disoriented to time and situation   BEHAVIOR & AGGRESSION TOOL COLOR: Green  ABNL VS/O2: VSS on RA  MOBILITY: Assist x2; lift; turned and repositioned Q2hrs and prn; paraplegic   PAIN MANAGMENT: Patient c/o generalized pain - Oxycodone given x1  DIET: Regular - appetite is good  BOWEL/BLADDER: Colostomy bag; Intermittent cath Q6  ABNL LAB/BG: Vanco level in process  DRAIN/DEVICES: R PIV SL- receiving intermittent antibiotics   TELEMETRY RHYTHM: NSR  SKIN: Scattered bruises and scrapes. Wound on heels- mepilex in place, Mepilex on coccyx for redness. Scattered scrapes on feet- band aids on 2 toes on R foot   TESTS/PROCEDURES: US MARIELENA doppler completed today - WNL; Xray bilateral foot ordered  D/C DAY/GOALS/PLACE: Pending improvement - CC following  OTHER IMPORTANT INFO: Sitter at bedside. Contact precautions maintained for MRSA & VRE. Patient has paraplegia; otherwise neuros are intact. Neurology/Podiatry/WOC following. Vascular Surgery saw patient - no surgical intervention needed at this time.

## 2024-04-05 NOTE — CONSULTS
[facility] ACUTE PAIN SERVICE    ([facility], [facility], [facility], [facility])  Pain Consult Note    Assessment/Plan:  Jose Lopez is a 63 year old male who was admitted on 4/3/2024.  Pain Service is asked to see the patient for evaluation with history of chronic pain, opiate abuse.  Admitted with altered mental status, he came via EMS, patient was unable to provide history.  Physical Therapist found him in his home slumped over and unresponsive, surrounded by drug paraphernalia and medications.  Patient is paraplegic and EMS reports that this has occurred before in previous encounters.    UDS was positive for fentanyl and cannabinoids. Was more agitated after narcan, but didn't really wake up. Received 1L IVF. Was trialed on bipap for 2 hours to see if he would wake up more, but he did not wake up--however HR and BP did improve.   History of paraplegia, neurogenic bowel and bladder, recent UTIs due to self catheterization multiple admissions for sepsis as well as significant opioid use and potential polysubstance use. Multiple previous potential overdoses, including 9/8/2023 and in 2018.    Vascular Surgery MARIELENA's normal, no sensation in lower extremities wheelchair bound, spasticity of his lower extremities, edematous.   Given findings, the patient does not have arterial insufficiency and excellent wound healing potential of his toe wounds. No vascular surgery intervention is warranted at this time.   Podiatry Consulted toxic encephalopathy with wounds of toes and heel of unknown length and origin. Patient identifies wounds developed about 2 months ago.  I will order a set of foot xrays as the last set done was over 4 months ago and he relates new injury/sores since that time.  Antibiotics per hospital team recommendations - Jose M/[place] [facility] RN notes reviewed.      Neurology consulted:  Encephalopathy most likely due to illicit drug use with fentanyl in UDS.  Question of opioid  "withdrawals     Per notes mental status improving, still has 1:1 in room.  Nurse identifies:      shows regular prescriptions for opioids.  55 total controlled substance prescriptions this past year.    Does not appear he has a consistent prescriber.      Over past 24 hours he received:  4(2.5mg) and 5 mg oxycodone times one.      PLAN:  Chronic back and leg pain in the setting of recent substance overdose and chronic opioid use. Drug screen positive for fentanyl and cannabinoids. Unexpected negative result on opiate screening, consider that patient may be diverting prescribed oxycodone and onstead using fentanyl.   Addiction Medicine Consult ordered.  Multimodal Medication Therapy:   Adjuvants: Added tylenol 975mg every 8 hours,  vistaril 25 mg every 6 hours prn  Added 5mg baclofen at hs, concern for baclofen withdrawal. Continue to increase to 5mg tid prn if mental status improves. Increase in spacticity, hyperthermia, anxiety, tremors, agitation, and visual changes may indicate withdrawal.  Opioids: oxycodone 2.5-5 mg decreased to bid prn, may report increased pain but do not recommend increasing dose.  No IV medications recommended   Intent to resume suboxone on Monday, patient is not a good candidate for on going opoid use due to multiple overdoses and polysubstance abuse including fentanyl and meth, both well documented.   Non-medication interventions- Ice, heat prn   Constipation Prophylaxis- daily stool softener/laxative   Follow up /Discharge Recommendations - We recommend prescribing the following at the time of discharge: TBD      Subjective:  Jose is stating he has chronic pain in his back and legs. His primary cause of discomfort is muscle spasms which are new and he states \"started when all of this began\", when asked to clarify he said two days ago. He reports baclofen, gabapentin, and suboxone all have been unhelpful in the past. He is intermittently confused and continues to perseverate on " "receiving more pain medications and \"who called because I was passed out, I would have woken up soon\".     He states he has been taking 10mg of oxycodone every 3 hours, but was \"forced to say he took more than that\".       Denies nausea, vomiting, diarrhea, chest pain, shortness of breath. Had a \"big bowel movement\" today.       Cellulitis of toe, unspecified laterality   Patient Active Problem List   Diagnosis    Calculus of gallbladder with acute cholecystitis    Gait abnormality    Muscle spasticity    Neurogenic bladder    Neurogenic bowel    Paraplegia (H)    S/P cholecystectomy    Shoulder pain    Sepsis due to urinary tract infection (H)    Urinary tract infection    UTI (urinary tract infection)    Altered mental state    Chronic buttock pain    Pressure injury of skin of left buttock, unspecified injury stage    Failure to thrive in adult    Diarrhea, unspecified type    Pyuria    Altered mental status, unspecified altered mental status type    Drug overdose of undetermined intent, initial encounter    Acute encephalopathy    Cellulitis of toe, unspecified laterality        History   Drug Use No         Tobacco Use      Smoking status: Never      Smokeless tobacco: Never        Current Facility-Administered Medications   Medication Dose Route Frequency Provider Last Rate Last Admin    apixaban ANTICOAGULANT (ELIQUIS) tablet 5 mg  5 mg Oral BID Rachelle Chacko DO   5 mg at 04/05/24 0826    famotidine (PEPCID) injection 20 mg  20 mg Intravenous Q12H Rachelle Chacko DO   20 mg at 04/05/24 0027    multivitamin w/minerals (THERA-VIT-M) tablet 1 tablet  1 tablet Oral Daily Nj Walsh MD   1 tablet at 04/05/24 0826    piperacillin-tazobactam (ZOSYN) 4.5 g vial to attach to  mL bag  4.5 g Intravenous Q6H Nj Walsh MD   4.5 g at 04/05/24 0557    sodium chloride (PF) 0.9% PF flush 3 mL  3 mL Intracatheter Q8H Rachelle Chacko DO   3 mL at " "04/05/24 0558    vancomycin (VANCOCIN) 1,250 mg in 0.9% NaCl 250 mL intermittent infusion  1,250 mg Intravenous Q12H Nj Walsh MD   1,250 mg at 04/05/24 0933    vancomycin (VANCOCIN) capsule 125 mg  125 mg Oral BID Nj Walsh MD   125 mg at 04/05/24 0827       Objective:  Vital signs in last 24 hours:  B/P: 124/68, T: 98.2, P: 74, R: 20   Blood pressure 124/68, pulse 74, temperature 98.2  F (36.8  C), temperature source Oral, resp. rate 20, height 1.778 m (5' 10\"), weight 67.7 kg (149 lb 4 oz), SpO2 99%.      Weight:   Wt Readings from Last 2 Encounters:   04/04/24 67.7 kg (149 lb 4 oz)   09/09/23 74.9 kg (165 lb 2 oz)        Results:  04/03/2024 Hydrocodone-Acetamin  54 for 9 days [facility] Olmscheid (Family [facility])  03/29/2024 Oxycodone 5 mg 48 tablets for 6 days Jimenez Olmscheid (Family [facility])  03/28/2024 Oxycodone 5 mg tablets 8 for 1 day [facility]  03/27/2024 Oxycodone 5 mg tablets 15 for 5 days [facility]  03/26/2024 Gabapentin 100 mg 30 capsules for 10 days [facility]  03/03/2024 Oxycodone 5 mg 15 tablets for 5 days   11/20/2023 Buprenorphine-Nalox 8-2 Mg Tab 28 for 14 days [facility]    Intake/Output:    Intake/Output Summary (Last 24 hours) at 4/5/2024 1230  Last data filed at 4/5/2024 0800  Gross per 24 hour   Intake 2190 ml   Output 1200 ml   Net 990 ml        Review of Systems:   As per subjective, all others negative.    Physical Exam:     General Appearance:  Alert, cooperative, no distress, appears stated age   Patient is laying in bed, 1:1 at the bedside   Head:  Normocephalic, without obvious abnormality, atraumatic   Lymph/Neck: Supple, symmetrical, trachea midline   Lungs:   Respirations unlabored, on room air    Musculoskeletal: Moves upper extremities spontaneously, no movement in BLE    Skin: Skin is warm and dry    Neurologic: Alert and oriented X 3, perseverating on " pain meds, intermittently confused         Imaging:  Personally Reviewed.    Results for orders placed or performed during the hospital encounter of 04/03/24   CT Head w/o Contrast    Impression    IMPRESSION:  1.  No acute intracranial process.   US MARIELENA Doppler No Exercise    Impression    IMPRESSION: Normal MARIELENA examination.    MARIELENA CRITERIA:  >1.4 NC  0.95-1.4 Normal  0.90 - 0.94 Mild  0.5 - 0.89 Moderate  0.2 - 0.49 Severe  <0.2 Critical    GIANNA MILTON MD         SYSTEM ID:  [id number]        Lab Results:  Personally Reviewed.   Last Comprehensive Metabolic Panel:  Sodium   Date Value Ref Range Status   04/04/2024 144 135 - 145 mmol/L Final     Comment:     Reference intervals for this test were updated on 09/26/2023 to more accurately reflect our healthy population. There may be differences in the flagging of prior results with similar values performed with this method. Interpretation of those prior results can be made in the context of the updated reference intervals.    12/05/2018 140 133 - 144 mmol/L Final     Potassium   Date Value Ref Range Status   04/04/2024 4.1 3.4 - 5.3 mmol/L Final   04/29/2019 4.2 3.5 - 5.1 mmol/L Final     Chloride   Date Value Ref Range Status   04/04/2024 110 (H) 98 - 107 mmol/L Final   12/05/2018 109 94 - 109 mmol/L Final     Carbon Dioxide   Date Value Ref Range Status   12/05/2018 23 20 - 32 mmol/L Final     Carbon Dioxide (CO2)   Date Value Ref Range Status   04/04/2024 22 22 - 29 mmol/L Final     Anion Gap   Date Value Ref Range Status   04/04/2024 12 7 - 15 mmol/L Final   12/05/2018 8 3 - 14 mmol/L Final     Glucose   Date Value Ref Range Status   04/04/2024 91 70 - 99 mg/dL Final   12/05/2018 94 70 - 99 mg/dL Final     GLUCOSE BY METER POCT   Date Value Ref Range Status   04/03/2024 88 70 - 99 mg/dL Final     Urea Nitrogen   Date Value Ref Range Status   04/04/2024 18.1 8.0 - 23.0 mg/dL Final   12/05/2018 23 7 - 30 mg/dL Final     Creatinine   Date Value Ref Range Status    04/04/2024 0.51 (L) 0.67 - 1.17 mg/dL Final   04/29/2019 0.76 0.73 - 1.18 mg/dL Final     GFR Estimate   Date Value Ref Range Status   04/04/2024 >90 >60 mL/min/1.73m2 Final   04/29/2019 >60 >60 ml/min/1.73m2 Final     Calcium   Date Value Ref Range Status   04/04/2024 8.9 8.8 - 10.2 mg/dL Final   12/05/2018 8.8 8.5 - 10.1 mg/dL Final        UA:   Amphetamine Qual Urine   Date Value Ref Range Status   12/03/2018 Positive (A) NEG^Negative Final     Comment:     Cutoff for a positive amphetamine is greater than 500 ng/mL. This is an   unconfirmed screening result to be used for medical purposes only.       Amphetamines Urine   Date Value Ref Range Status   04/03/2024 Screen Negative Screen Negative Final     Comment:     Cutoff for a negative amphetamine is less than 500 ng/mL.     Barbiturates Qual Urine   Date Value Ref Range Status   12/03/2018 Negative NEG^Negative Final     Comment:     Cutoff for a negative barbiturate is 200 ng/mL or less.     Barbituates Urine   Date Value Ref Range Status   04/03/2024 Screen Negative Screen Negative Final     Comment:     Cutoff for a negative barbiturate is less than 200 ng/mL.     Benzodiazepine Qual Urine   Date Value Ref Range Status   12/03/2018 Negative NEG^Negative Final     Comment:     Cutoff for a negative benzodiazepine is 200 ng/mL or less.     Cannabinoids Qual Urine   Date Value Ref Range Status   12/03/2018 Positive (A) NEG^Negative Final     Comment:     Cutoff for a positive cannabinoid is greater than 50 ng/mL. This is an   unconfirmed screening result to be used for medical purposes only.       Cannabinoids Urine   Date Value Ref Range Status   04/03/2024 Screen Positive (A) Screen Negative Final     Comment:     Cutoff for a positive cannabinoid is 50 ng/mL or greater.   This is an unconfirmed screening result to be used for medical purposes only.     Cocaine Qual Urine   Date Value Ref Range Status   12/03/2018 Negative NEG^Negative Final     Comment:      Cutoff for a negative cocaine is 300 ng/mL or less.     Cocaine Urine   Date Value Ref Range Status   04/03/2024 Screen Negative Screen Negative Final     Comment:     Cutoff for a negative cocaine is less than 300 ng/mL.     Opiates Qualitative Urine   Date Value Ref Range Status   12/03/2018 Positive (A) NEG^Negative Final     Comment:     Cutoff for a positive opiate is greater than 300 ng/mL. This is an unconfirmed   screening result to be used for medical purposes only.       Opiates Urine   Date Value Ref Range Status   04/03/2024 Screen Negative Screen Negative Final     Comment:     Cutoff for a negative opiate is less than 300 ng/mL.     PCP Qual Urine   Date Value Ref Range Status   12/03/2018 Negative NEG^Negative Final     Comment:     Cutoff for a negative PCP is 25 ng/mL or less.     PCP Urine   Date Value Ref Range Status   04/03/2024 Screen Negative Screen Negative Final     Comment:     Cutoff for a negative PCP is less than 25 ng/mL.        MANAGEMENT DISCUSSED with the following over the past 24 hours: RN  NOTE(S)/MEDICAL RECORDS REVIEWED over the past 24 hours:   -Neurology: concern for opioid withdrawal, mental status improving  -Medicine: H&P, HPI   -Podiatry: following for wounds on foot wounds   Tests personally interpreted in the past 24 hours:  - foot xray showing No acute fracture, diffuse osseous demineralization  Tests REVIEWED in the past 24 hours:  - BMP  - CBC  - Urine drug screen   Medical complexity over the past 24 hours:  - Prescription DRUG MANAGEMENT performed    ELLEN Sauceda, FNP-BC  Acute Inpatient Pain Team  M-F   Paging via [employer] or [employer]

## 2024-04-05 NOTE — PLAN OF CARE
Goal Outcome Evaluation:     DATE & TIME: 4/5/24 [id number]                Cognitive Concerns/ Orientation: A&O x2-3 disoriented to time and situation   BEHAVIOR & AGGRESSION TOOL COLOR: Green  ABNL VS/O2: VSS on RA  MOBILITY: Ax2; lift; T/R Q2hrs; paraplegic   PAIN MANAGMENT: Patient c/o severe generalized pain and spasms. PRN Oxycodone and prn tylenol given x1  DIET: Regular   BOWEL/BLADDER: Colostomy bag; Intermittent cath Q6  ABNL LAB/BG: Vanco level in process  DRAIN/DEVICES: R PIV SL, int abx.  TELEMETRY RHYTHM: NSR  SKIN: Scattered bruises and scrapes. Wound on heels- mepilex in place, Mepilex on coccyx for redness. Scattered scrapes on feet- band aids on 2 toes on R foot   TESTS/PROCEDURES: Xray bilateral foot done this afternoon.  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 ID, pain management and addiction medicine consults placed.

## 2024-04-05 NOTE — CONSULTS
VASCULAR SURGERY INPATIENT CONSULTATION / Initial In-Patient visit    VASCULAR SURGEON: Dr. Arora    LOCATION: [facility]    Jose Lopez  Medical Record #:  [id number]  YOB: 1960  Age:  63 year old     Date of Service: 4/3/2024    PRIMARY CARE PROVIDER: Sung Hollis    Reason for consultation:  Bilateral lower extremity wounds, PAD    Jose Lopez is a 63 year old male who is noted to have bilateral lower extremity toe wounds. On examination Jose is alert and oriented x1-2. He has minimal to no sensation in his lower extremities, wheelchair bound, with spasticity of his lower extremities. 1+ edema predominately in the ankles, 2+ DP pulses bilaterally, 1+ PT pulses, with 2+ femoral pulses. Normothermic to touch bilaterally.     He has excoriations of his shins, with numerous toe abrasions with erythema and swelling to the rafael-wound.       RECOMMENDATION:  Will rule out PAD would recommend ABIs with toe pressures to fully assess wound healing potential. No surgical debridement needed for wounds. Would also recommend specialized nail care.     ABIs show triphasic waveforms throughout with MARIELENA of 1.12 on the left with a toe pressure of 148 and the right with an MARIELENA of 1.11 with a toe pressure of 100. Given findings, the patient does not have arterial insufficiency and excellent wound healing potential of his toe wounds. No vascular surgery intervention is warranted at this time.     HPI:  Jose Lopez is a 63 year old male who was seen today in consultation for bilateral lower extremity wounds.Hx of paraplegia with neurogenic bowel and bladder, UTIs, who was admitted on 4/3 due to drug overdose related to Fentanyl. He has had numerous hospitalizations in the past year either for drug overdose or sepsis related to UTI. On presentation noted to have numerous wounds. There are several on the bilateral toes, in which Jose lara has been there for several months, however  noted to be poor historian and when asked again he stated the wounds were from last week, so unsure of chronicity.     PHH:    Past Medical History:   Diagnosis Date    Benzodiazepine dependence (H)     Muscle spasticity     Neurogenic bladder     Neurogenic bowel     Paraplegia (H)     Sepsis (H)     from UTI    Spinal cord injury at T9 level (H) 1994    per chart review    Substance abuse (H)     UTI (urinary tract infection)          Past Surgical History:   Procedure Laterality Date    BACK SURGERY      CHOLECYSTECTOMY      EXAM UNDER ANESTHESIA RECTUM N/A 5/1/2019    Procedure: EXAM UNDER ANESTHESIA, RECTUM;  Surgeon: Tiburcio Barros MD;  Location: [facility] OR    FISTULOTOMY RECTUM N/A 5/1/2019    Procedure: FISTULOTOMY AND REMOVAL OF ANAL SKIN TAGS;  Surgeon: Tiburcio Barros MD;  Location:  OR    ORTHOPEDIC SURGERY          ALLERGIES:     Allergies   Allergen Reactions    Sertraline Other (See Comments)     Other reaction(s): Gastrointestinal  Other reaction(s): Gastrointestinal        MEDS:    Current Facility-Administered Medications:     acetaminophen (TYLENOL) tablet 650 mg, 650 mg, Oral, Q4H PRN, 650 mg at 04/04/24 2019 **OR** acetaminophen (TYLENOL) Suppository 650 mg, 650 mg, Rectal, Q4H PRN, Rachelle Chacko DO    apixaban ANTICOAGULANT (ELIQUIS) tablet 5 mg, 5 mg, Oral, BID, Rachelle Chacko DO, 5 mg at 04/05/24 0826    calcium carbonate (TUMS) chewable tablet 1,000 mg, 1,000 mg, Oral, 4x Daily PRN, Rachelle Chacko DO    famotidine (PEPCID) injection 20 mg, 20 mg, Intravenous, Q12H, aRchelle Chacko DO, 20 mg at 04/05/24 0027    lidocaine (LMX4) cream, , Topical, Q1H PRN, Rachelle Chacko DO    lidocaine 1 % 0.1-1 mL, 0.1-1 mL, Other, Q1H PRN, Rachelle Chacko DO    multivitamin w/minerals (THERA-VIT-M) tablet 1 tablet, 1 tablet, Oral, Daily, Nj Walsh MD, 1 tablet at 04/05/24 0826    ondansetron (ZOFRAN ODT) ODT tab 4 mg, 4  "mg, Oral, Q6H PRN **OR** ondansetron (ZOFRAN) injection 4 mg, 4 mg, Intravenous, Q6H PRN, Rachelle Chacko DO    oxyCODONE (ROXICODONE) tablet 5 mg, 5 mg, Oral, Q4H PRN, Rachelle Chacko DO    oxyCODONE IR (ROXICODONE) half-tab 2.5 mg, 2.5 mg, Oral, Q4H PRN, Rachelle Chacko DO, 2.5 mg at 04/05/24 0558    piperacillin-tazobactam (ZOSYN) 4.5 g vial to attach to  mL bag, 4.5 g, Intravenous, Q6H, Nj Walsh MD, 4.5 g at 04/05/24 0557    senna-docusate (SENOKOT-S/PERICOLACE) 8.6-50 MG per tablet 1 tablet, 1 tablet, Oral, BID PRN **OR** senna-docusate (SENOKOT-S/PERICOLACE) 8.6-50 MG per tablet 2 tablet, 2 tablet, Oral, BID PRN, Rachelle Chacko DO    sodium chloride (PF) 0.9% PF flush 3 mL, 3 mL, Intracatheter, Q8H, Rachelle Chacko DO, 3 mL at 04/05/24 0558    sodium chloride (PF) 0.9% PF flush 3 mL, 3 mL, Intracatheter, q1 min prn, Rachelle Chacko DO    vancomycin (VANCOCIN) 1,250 mg in 0.9% NaCl 250 mL intermittent infusion, 1,250 mg, Intravenous, Q12H, Nj Walsh MD, 1,250 mg at 04/04/24 2026    vancomycin (VANCOCIN) capsule 125 mg, 125 mg, Oral, BID, Nj Walsh MD, 125 mg at 04/05/24 0827     SOCIAL HABITS:    Tobacco Use      Smoking status: Never      Smokeless tobacco: Never     Social History    Substance and Sexual Activity      Alcohol use: No       History   Drug Use No        FAMILY HISTORY:  No family history on file.    REVIEW OF SYSTEMS:    A 12 point ROS was reviewed and except for what is listed in the HPI above, all others are negative    PE:    Vital signs:  Temp: 98.2  F (36.8  C) Temp src: Oral BP: 124/68 Pulse: 74   Resp: 20 SpO2: 99 % O2 Device: None (Room air) Oxygen Delivery: 1 LPM Height: 177.8 cm (5' 10\") Weight: 67.7 kg (149 lb 4 oz)  Estimated body mass index is 21.42 kg/m  as calculated from the following:    Height as of this encounter: 1.778 m (5' 10\").    Weight as of " this encounter: 67.7 kg (149 lb 4 oz).       Wt Readings from Last 1 Encounters:   04/04/24 67.7 kg (149 lb 4 oz)     Body mass index is 21.42 kg/m .    EXAM:  GENERAL: This is a well-developed 63 year old male who appears his stated age  MUSCULOSKELETAL: Grossly normal and both lower extremities are intact.  HEME/LYMPH: No lymphedema  NEUROLOGIC: Focally intact, Alert and oriented x 3.   PSYCH: appropriate affect  INTEGUMENT: Numerous wounds to bilateral toes. Excoriations noted of the shins  VASCULAR: 2+ DP pulses, 1+ PT pulses, 2+ femoral pulses        DIAGNOSTIC STUDIES:     Images:  CT Head w/o Contrast    Result Date: 4/3/2024  EXAM: CT HEAD W/O CONTRAST LOCATION: [facility] DATE: 4/3/2024 INDICATION: altered mental status COMPARISON: 12/30/2023. TECHNIQUE: Routine CT Head without IV contrast. Multiplanar reformats. Dose reduction techniques were used. FINDINGS: INTRACRANIAL CONTENTS: No intracranial hemorrhage, extraaxial collection, or mass effect.  No CT evidence of acute infarct. Normal parenchymal attenuation. Normal ventricles and sulci. VISUALIZED ORBITS/SINUSES/MASTOIDS: No intraorbital abnormality. Mucosal thickening primarily involving the ethmoid air cells. No middle ear or mastoid effusion. BONES/SOFT TISSUES: No acute abnormality.     IMPRESSION: 1.  No acute intracranial process.    US Renal-bladder    COMPARISON:  CT 03/20/2024 FINDINGS:   Right kidney: Measures 11.0 x 6.0 x 5.8 cm.  Appears unremarkable. Left kidney: Measures  10.2 x 5.2 x 4.2 cm. Mild-to-moderate hydronephrosis. Urinary bladder: Urinary bladder is decompressed with indwelling Dover catheter. Small amount of air within the urinary bladder lumen, likely due to catheter placement. The bladder wall is diffusely thickened. IMPRESSION: 1. Interval resolution of right-sided hydronephrosis and partially improved left-sided hydronephrosis, now mild-to-moderate. 2. Urinary bladder is decompressed with  indwelling Dover catheter. Diffuse thickening of the urinary bladder wall.    CT Abdomen Pelvis w Contrast    Result Date: 3/20/2024  EXAM: CT ABD PELVIS W IV CONT ONLY LOCATION: [facility] DATE: 3/20/2024 INDICATION: Abdominal pain COMPARISON: 02/28/2024 TECHNIQUE: Helical enhanced thin-section CT scan of the abdomen and pelvis was performed following injection of IV contrast. Multiplanar reformats were obtained. Dose reduction techniques were used. CONTRAST: IOHEXOL 350 MG/ML IV SOLN 75 mL FINDINGS: LOWER CHEST: Right calcified pleural plaque. Calcified granuloma left lung base. HEPATOBILIARY: Cholecystectomy. PANCREAS: Atrophic. SPLEEN: Normal. ADRENAL GLANDS: Normal. KIDNEYS/BLADDER: Left greater than right hydronephrosis and renal pelvic dilatation. The bladder is distended and there is circumferential wall thickening. Hyperdensity within the right bladder base. BOWEL: Wall thickening of the distal esophagus and GE junction. The stomach is distended. Moderate amount of colonic stool. Postsurgical change sigmoid colon with left lower quadrant colostomy and Acuña's pouch. LYMPH NODES: Normal. VASCULATURE: Atherosclerotic vascular calcification. PELVIC ORGANS: Stable. MUSCULOSKELETAL: Osseous demineralization and degenerative change. Postsurgical change thoracic spine. IMPRESSION: 1.  Wall thickening of the visualized distal esophagus and GE junction. Correlate for esophagitis and with follow-up to confirm resolution. 2.  The bladder is distended and circumferential wall thickening is again seen. Hyperdensity within the bladder base. There is no contrast in the ureters. Was there recent contrast administration? Correlate with urinalysis for blood products. 3.  Bilateral hydronephrosis (left greater than right).      LABS:      Sodium   Date Value Ref Range Status   04/04/2024 144 135 - 145 mmol/L Final     Comment:     Reference intervals for this test were updated on 09/26/2023 to more accurately  reflect our healthy population. There may be differences in the flagging of prior results with similar values performed with this method. Interpretation of those prior results can be made in the context of the updated reference intervals.    04/03/2024 139 135 - 145 mmol/L Final     Comment:     Reference intervals for this test were updated on 09/26/2023 to more accurately reflect our healthy population. There may be differences in the flagging of prior results with similar values performed with this method. Interpretation of those prior results can be made in the context of the updated reference intervals.    09/11/2023 139 136 - 145 mmol/L Final   12/05/2018 140 133 - 144 mmol/L Final   12/03/2018 139 133 - 144 mmol/L Final   08/18/2018 138 133 - 144 mmol/L Final     Urea Nitrogen   Date Value Ref Range Status   04/04/2024 18.1 8.0 - 23.0 mg/dL Final   04/03/2024 33.2 (H) 8.0 - 23.0 mg/dL Final   09/11/2023 18.5 8.0 - 23.0 mg/dL Final   12/05/2018 23 7 - 30 mg/dL Final   12/03/2018 23 7 - 30 mg/dL Final   08/18/2018 26 7 - 30 mg/dL Final     Hemoglobin   Date Value Ref Range Status   04/04/2024 10.4 (L) 13.3 - 17.7 g/dL Final   04/03/2024 11.5 (L) 13.3 - 17.7 g/dL Final   09/11/2023 9.4 (L) 13.3 - 17.7 g/dL Final   12/05/2018 13.0 (L) 13.3 - 17.7 g/dL Final   12/03/2018 14.4 13.3 - 17.7 g/dL Final   08/16/2018 14.4 13.3 - 17.7 g/dL Final     Platelet Count   Date Value Ref Range Status   04/04/2024 327 150 - 450 10e3/uL Final   04/03/2024 412 150 - 450 10e3/uL Final   09/11/2023 398 150 - 450 10e3/uL Final   12/05/2018 276 150 - 450 10e9/L Final   12/03/2018 301 150 - 450 10e9/L Final   08/16/2018 302 150 - 450 10e9/L Final     INR   Date Value Ref Range Status   08/16/2018 1.02 0.86 - 1.14 Final     35 min spent on the date of the encounter in chart review, patient visit, review of tests, documentation and/or discussion with other providers about the issues documented above     [facility]  [facility]

## 2024-04-05 NOTE — PLAN OF CARE
Summary: Drug Overdose - Fentanyl and opioids; 4th admission since December    DATE & TIME: 4/4/24 4795-8303     Cognitive Concerns/ Orientation: Was somnolent in the beginning of the shift but now awake and alert; still confused - disoriented to place, time and date. Speech is garbled and difficult to understand patient. Unable to conduct full Neuro assessment r/t confusion.  BEHAVIOR & AGGRESSION TOOL COLOR: Green  ABNL VS/O2: VSS on RA  MOBILITY: Assist x2; lift; turned and repositioned Q2hrs and prn; paraplegic   PAIN MANAGMENT: Patient c/o generalized pain - Oxycodone given x2 with relief   DIET: Regular - tolerating  BOWEL/BLADDER: Colostomy bag; Intermittent cath Q6 ; BUS showed >800mL but patient voided before he was straight cathed (saturated chux pads x 2 this shift)  ABNL LAB/BG: Cr 0.51; WBC 11.1; Hgb 10.4  DRAIN/DEVICES: R PIV infusing NS @ 100 mL/hr  TELEMETRY RHYTHM: NSR  SKIN: scattered bruises and scrapes. Wound on heels- mepilex in place, Mepilex on coccyx for redness. Scattered scrapes on feet- band aids on 2 toes on R foot  Dressings changed today per WOC.  TESTS/PROCEDURES: None this shift   D/C DAY/GOALS/PLACE: Pending improvement - CC following  OTHER IMPORTANT INFO: Sitter at bedside. Contact precautions maintained for MRSA & VRE. Neurology/WOC following.

## 2024-04-06 LAB — BACTERIA UR CULT: ABNORMAL

## 2024-04-06 PROCEDURE — 120N000001 HC R&B MED SURG/OB

## 2024-04-06 PROCEDURE — 99231 SBSQ HOSP IP/OBS SF/LOW 25: CPT | Performed by: PODIATRIST

## 2024-04-06 PROCEDURE — 250N000013 HC RX MED GY IP 250 OP 250 PS 637: Performed by: PHYSICIAN ASSISTANT

## 2024-04-06 PROCEDURE — 99232 SBSQ HOSP IP/OBS MODERATE 35: CPT | Performed by: STUDENT IN AN ORGANIZED HEALTH CARE EDUCATION/TRAINING PROGRAM

## 2024-04-06 PROCEDURE — 250N000013 HC RX MED GY IP 250 OP 250 PS 637: Performed by: STUDENT IN AN ORGANIZED HEALTH CARE EDUCATION/TRAINING PROGRAM

## 2024-04-06 PROCEDURE — 250N000013 HC RX MED GY IP 250 OP 250 PS 637: Performed by: HOSPITALIST

## 2024-04-06 PROCEDURE — 250N000013 HC RX MED GY IP 250 OP 250 PS 637

## 2024-04-06 RX ADMIN — ACETAMINOPHEN 975 MG: 325 TABLET, FILM COATED ORAL at 06:06

## 2024-04-06 RX ADMIN — FAMOTIDINE 20 MG: 20 TABLET ORAL at 08:57

## 2024-04-06 RX ADMIN — VANCOMYCIN HYDROCHLORIDE 125 MG: 125 CAPSULE ORAL at 21:45

## 2024-04-06 RX ADMIN — CEPHALEXIN 250 MG: 250 CAPSULE ORAL at 21:45

## 2024-04-06 RX ADMIN — BACLOFEN 5 MG: 10 TABLET ORAL at 21:45

## 2024-04-06 RX ADMIN — APIXABAN 5 MG: 5 TABLET, FILM COATED ORAL at 08:57

## 2024-04-06 RX ADMIN — CEPHALEXIN 250 MG: 250 CAPSULE ORAL at 06:09

## 2024-04-06 RX ADMIN — OXYCODONE HYDROCHLORIDE 5 MG: 5 TABLET ORAL at 08:57

## 2024-04-06 RX ADMIN — ACETAMINOPHEN 650 MG: 325 TABLET, FILM COATED ORAL at 02:28

## 2024-04-06 RX ADMIN — OXYCODONE HYDROCHLORIDE 5 MG: 5 TABLET ORAL at 21:16

## 2024-04-06 RX ADMIN — DOCUSATE SODIUM 50 MG AND SENNOSIDES 8.6 MG 2 TABLET: 8.6; 5 TABLET, FILM COATED ORAL at 10:36

## 2024-04-06 RX ADMIN — CEPHALEXIN 250 MG: 250 CAPSULE ORAL at 13:36

## 2024-04-06 RX ADMIN — ACETAMINOPHEN 975 MG: 325 TABLET, FILM COATED ORAL at 23:38

## 2024-04-06 RX ADMIN — VANCOMYCIN HYDROCHLORIDE 125 MG: 125 CAPSULE ORAL at 08:57

## 2024-04-06 RX ADMIN — FAMOTIDINE 20 MG: 20 TABLET ORAL at 21:45

## 2024-04-06 RX ADMIN — APIXABAN 5 MG: 5 TABLET, FILM COATED ORAL at 21:45

## 2024-04-06 RX ADMIN — ACETAMINOPHEN 975 MG: 325 TABLET, FILM COATED ORAL at 14:40

## 2024-04-06 RX ADMIN — CALCIUM CARBONATE (ANTACID) CHEW TAB 500 MG 1000 MG: 500 CHEW TAB at 17:47

## 2024-04-06 RX ADMIN — Medication 1 TABLET: at 08:57

## 2024-04-06 ASSESSMENT — ACTIVITIES OF DAILY LIVING (ADL)
ADLS_ACUITY_SCORE: 56
ADLS_ACUITY_SCORE: 52
ADLS_ACUITY_SCORE: 56
ADLS_ACUITY_SCORE: 51
ADLS_ACUITY_SCORE: 56
ADLS_ACUITY_SCORE: 51
ADLS_ACUITY_SCORE: 52
ADLS_ACUITY_SCORE: 56
ADLS_ACUITY_SCORE: 56
ADLS_ACUITY_SCORE: 52
ADLS_ACUITY_SCORE: 52
ADLS_ACUITY_SCORE: 56
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
## 2024-04-06 NOTE — PROGRESS NOTES
"[facility]    Medicine Progress Note - Hospitalist Service    Date of Admission:  4/3/2024    Assessment & Plan   Jose Lopez is a 63 year old male with PMH paraplegia, neurogenic bowel and bladder, recent UTIs due to self catheterization admitted on 4/3/2024 with suspected toxic encephalopathy due to drug overdose.     Toxic encephalopathy, drug overdose Improving  Acute hypoxic, hypercapneic respiratory failure Resolved  Respiratory acidosis  Sinus bradycardia  # Chronic opiate use  Found slumped over with \"drug paraphernalia\" around him at his home. Received narcan in ED after his UDS was positive for fentanyl and cannabinoids. Was more agitated after narcan, but didn't really wake up. Received 1L IVF. Was trialed on bipap for 2 hours to see if he would wake up more, but he did not wake up--however HR and BP did improve.  *4/3 CT head: no acute intracranial process  *Of note this is very similar presentation to 9/8/23 hospitalization where he was found down, given narcan and then remained encephalopathic for 20h until he came back around    Patient's mental status currently at his baseline.  Neurology consult appreciated and neurology has signed off and the patient  -Pain management consulted and pain management added Tylenol 975 mg every 8 hours  vistaril 25 mg every 6 hours prn .  5 mg baclofen at night  -As per pain management can increase baclofen for 35 mg 3 times daily if mental status improves  -Oxycodone 2.5 to 5 mg decreased to twice daily as needed by pain management and does not recommend increasing dose  -Patient has been having intermittent confusion and will continue neurochecks  -Hold PTA gabapentin  -Pain management planning to start him on Suboxone on Monday           Coccyx wound and toe abrasions  # Cellulitis  # Toe wounds  -Consult wound RN for eval  -Infectious consult appreciated on Zosyn and vancomycin stoppedd  - Keflex for mild cellulitis started with " infectious disease  -Follow blood cultures  -Wound care following   -Podiatry consulted and ordered foot x-rays  -Vascular surgery has been consulted and ABIs done today revealed normal exam  -Vascular surgery recommending no surgical intervention.  -Podiatry following and not recommending any surgical recommendation.  I reviewed podiatry notes on April 6, 2024    Elevated troponin, suspect type 2 NSTEMI  -Troponins have been flat with no anginal pain and this is likely due to demand  T9 Paraplegia (HRC)  Neurogenic bowel  Chronic constipation  - ostomy RN consulted  - await confirmation of current bowel regimen, resume as able     Recent sepsis/UTI due to self cath  Admitted at [facility] 3/20-3/27/24. Enterococcus treated with ampicillin for 7 days, finished treatment on 3/25  4/3 UA not concerning for infection  - continue straight cath q6h  -Infectious disease consulted and recommended only Keflex for mild cellulitis        History of Cdiff colitis  -monitor stools  -Continue oral vancomycin prophylaxis due to history of C. difficile  History of DVT (deep vein thrombosis)  Resume PTA DOAC, seems he recently switched from xarelto to  eliquis     Esophagitis  Noted during 3/20 admit at [facility]  - continue famotidine BID             Diet: Combination Diet Regular Diet Adult  Snacks/Supplements Adult: Magic Cup; With Meals    DVT Prophylaxis: DOAC  Dover Catheter: Not present  Lines: None     Cardiac Monitoring: ACTIVE order. Indication: Bradycardias (48 hours)  Code Status: Full Code      Clinically Significant Risk Factors                          # Moderate Malnutrition: based on nutrition assessment, PRESENT ON ADMISSION          Disposition Plan     Expected Discharge Date: 04/08/2024      Destination: inpatient rehabilitation facility              Nj Walsh MD  Hospitalist Service  [facility]  Securely message with [employer] (more info)  Text page via [employer]  Paging/Directory   ______________________________________________________________________    Interval History   Patient seen and examined at bedside discussed with nurse.  Patient having intermittent confusion.  Patient complaining of pain meds and spasm in his lower extremities.  Experience of intermittent confusion.          Physical Exam   Vital Signs: Temp: 97.9  F (36.6  C) Temp src: Oral BP: (!) 142/75 Pulse: 90   Resp: 18 SpO2: 97 % O2 Device: None (Room air)    Weight: 151 lbs 14.35 oz    Physical Exam  Cardiovascular:      Rate and Rhythm: Normal rate and regular rhythm.      Heart sounds: Normal heart sounds.   Pulmonary:      Effort: Pulmonary effort is normal. No respiratory distress.      Breath sounds: Normal breath sounds.   Abdominal:      General: There is no distension.      Palpations: Abdomen is soft.      Tenderness: There is no abdominal tenderness.   Musculoskeletal:      Comments: Bilateral wounds on his toes          Medical Decision Making       40 MINUTES SPENT BY ME on the date of service doing chart review, history, exam, documentation & further activities per the note.      Data         Imaging results reviewed over the past 24 hrs:   Recent Results (from the past 24 hour(s))   XR Foot Bilateral G/E 3 Views    Narrative    EXAM: XR FOOT BILATERAL G/E 3 VIEWS  DATE/TIME: 4/5/2024 1:11 PM    INDICATION: ulcer to heel, sore/infection to toes.  unknown if trauma  COMPARISON: None available.       Impression    IMPRESSION: Diffuse osseous demineralization. Mild degenerative  arthrosis involving multiple joints of the midfoot and forefoot  bilaterally. No acute fracture or evidence of osteomyelitis. Partial  visualization of an intramedullary luis manuel within the left tibia.    STELLA CLEMENTS DO         SYSTEM ID:  [id number]

## 2024-04-06 NOTE — PLAN OF CARE
Summary: Drug Overdose - Fentanyl and opioids; 4th admission since December    DATE & TIME: 4/6/2024 [id number]     Cognitive Concerns/ Orientation: A&O x2 disoriented to time and situation;    BEHAVIOR & AGGRESSION TOOL COLOR: Green  ABNL VS/O2: VSS on RA  MOBILITY: Assist x2; lift; turned and repositioned Q2hrs and prn; paraplegic   PAIN MANAGMENT: Patient c/o generalized pain - Oxycodone given x1; also on scheduled Tylenol and Baclofen   DIET: Regular - appetite is good  BOWEL/BLADDER: Colostomy bag - changed today; Intermittent cath Q6 - BUS showed 955mL and 675mL - straight cathed x2 with immediate adequate returns and also incontinent at times   ABNL LAB/BG: None drawn  DRAIN/DEVICES: R PIV SL  TELEMETRY RHYTHM: NSR  SKIN: Scattered bruises and scrapes. Wound on heels- mepilex in place, Mepilex on coccyx for redness. 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 Patient has paraplegia; otherwise neuros are intact. Per Neurology, no need for further investigations at this time. Vascular Surgery saw patient - no surgical intervention needed at this time. Podiatry signed off. Patient frequently requesting pain medications. Pain service saw patient Friday - oxycodone can be given BID PRN only and now has Tylenol and baclofen scheduled.

## 2024-04-06 NOTE — PROGRESS NOTES
[id number]. VSS. A/O. Talk fast, hard to understand sometimes. Neuros intact except baseline paraplegic. Colostomy intact. Refused bladder scan, no urge to cath per pt. Last str8t cath was 3 pm. Scabs on toes, L shin, heel & scar on hip. Coccyx mapile skin intact. Repo Q2. Tolerating diet. Oxy BID, wants to take before bed. Tums given once.

## 2024-04-06 NOTE — PLAN OF CARE
Goal Outcome Evaluation:       4/6/2024 NOC      Cognitive Concerns/ Orientation: A&O x2- disoriented to time and situation   BEHAVIOR & AGGRESSION TOOL COLOR: Green  ABNL VS/O2: VSS on RA  MOBILITY: Assist x2; lift; turned and repositioned Q2hrs and prn; paraplegic   PAIN MANAGMENT: Patient c/o generalized pain, Received scheduled Tylenol and baclofen   DIET: Regular   BOWEL/BLADDER: Colostomy bag; Intermittent cath Q6, cath for 450 and 400 ml   ABNL LAB/BG: Creat 0.51, K+ 4.1  DRAIN/DEVICES: R PIV SL  TELEMETRY RHYTHM: NSR  SKIN: Scattered bruises and scrapes. Wound on heels- mepilex in place, Mepilex on coccyx for redness. Scattered scrapes on feet-   TESTS/PROCEDURES: US MARIELENA doppler completed Friday - WNL; Xray bilateral foot, no evidence of fracture or osteomyelitis   D/C DAY/GOALS/PLACE: Pending improvement - CC following  OTHER IMPORTANT INFO: Contact precautions maintained for MRSA & VRE. Patient has paraplegia; otherwise neuros are intact. Neurology/Podiatry/WOC following. Vascular Surgery saw patient - no surgical intervention needed at this time. Pain service saw patient Friday, oxycodone can be given BID PRN only and now has Tylenol and baclofen scheduled, patient frequently requesting pain medications.

## 2024-04-07 LAB
ANION GAP SERPL CALCULATED.3IONS-SCNC: 16 MMOL/L (ref 7–15)
BUN SERPL-MCNC: 29.1 MG/DL (ref 8–23)
CALCIUM SERPL-MCNC: 8.8 MG/DL (ref 8.8–10.2)
CHLORIDE SERPL-SCNC: 99 MMOL/L (ref 98–107)
CREAT SERPL-MCNC: 0.7 MG/DL (ref 0.67–1.17)
DEPRECATED HCO3 PLAS-SCNC: 20 MMOL/L (ref 22–29)
EGFRCR SERPLBLD CKD-EPI 2021: >90 ML/MIN/1.73M2
ERYTHROCYTE [DISTWIDTH] IN BLOOD BY AUTOMATED COUNT: 17.8 % (ref 10–15)
GLUCOSE SERPL-MCNC: 93 MG/DL (ref 70–99)
HCT VFR BLD AUTO: 31.8 % (ref 40–53)
HGB BLD-MCNC: 10.3 G/DL (ref 13.3–17.7)
MCH RBC QN AUTO: 24.4 PG (ref 26.5–33)
MCHC RBC AUTO-ENTMCNC: 32.4 G/DL (ref 31.5–36.5)
MCV RBC AUTO: 75 FL (ref 78–100)
PLATELET # BLD AUTO: 340 10E3/UL (ref 150–450)
POTASSIUM SERPL-SCNC: 4.6 MMOL/L (ref 3.4–5.3)
RBC # BLD AUTO: 4.22 10E6/UL (ref 4.4–5.9)
SODIUM SERPL-SCNC: 135 MMOL/L (ref 135–145)
WBC # BLD AUTO: 10.2 10E3/UL (ref 4–11)

## 2024-04-07 PROCEDURE — 250N000013 HC RX MED GY IP 250 OP 250 PS 637: Performed by: PHYSICIAN ASSISTANT

## 2024-04-07 PROCEDURE — 250N000013 HC RX MED GY IP 250 OP 250 PS 637: Performed by: HOSPITALIST

## 2024-04-07 PROCEDURE — 250N000013 HC RX MED GY IP 250 OP 250 PS 637: Performed by: STUDENT IN AN ORGANIZED HEALTH CARE EDUCATION/TRAINING PROGRAM

## 2024-04-07 PROCEDURE — 120N000001 HC R&B MED SURG/OB

## 2024-04-07 PROCEDURE — 99232 SBSQ HOSP IP/OBS MODERATE 35: CPT | Performed by: STUDENT IN AN ORGANIZED HEALTH CARE EDUCATION/TRAINING PROGRAM

## 2024-04-07 PROCEDURE — 85027 COMPLETE CBC AUTOMATED: CPT | Performed by: STUDENT IN AN ORGANIZED HEALTH CARE EDUCATION/TRAINING PROGRAM

## 2024-04-07 PROCEDURE — 36415 COLL VENOUS BLD VENIPUNCTURE: CPT | Performed by: STUDENT IN AN ORGANIZED HEALTH CARE EDUCATION/TRAINING PROGRAM

## 2024-04-07 PROCEDURE — 80048 BASIC METABOLIC PNL TOTAL CA: CPT | Performed by: STUDENT IN AN ORGANIZED HEALTH CARE EDUCATION/TRAINING PROGRAM

## 2024-04-07 PROCEDURE — 250N000013 HC RX MED GY IP 250 OP 250 PS 637: Performed by: INTERNAL MEDICINE

## 2024-04-07 PROCEDURE — 250N000013 HC RX MED GY IP 250 OP 250 PS 637

## 2024-04-07 RX ORDER — OXYBUTYNIN CHLORIDE 5 MG/1
5 TABLET ORAL 3 TIMES DAILY
Status: DISCONTINUED | OUTPATIENT
Start: 2024-04-07 | End: 2024-04-08 | Stop reason: HOSPADM

## 2024-04-07 RX ORDER — FLUTICASONE PROPIONATE 50 MCG
1 SPRAY, SUSPENSION (ML) NASAL DAILY
Status: DISCONTINUED | OUTPATIENT
Start: 2024-04-07 | End: 2024-04-08 | Stop reason: HOSPADM

## 2024-04-07 RX ADMIN — APIXABAN 5 MG: 5 TABLET, FILM COATED ORAL at 20:36

## 2024-04-07 RX ADMIN — DOCUSATE SODIUM 50 MG AND SENNOSIDES 8.6 MG 2 TABLET: 8.6; 5 TABLET, FILM COATED ORAL at 22:33

## 2024-04-07 RX ADMIN — Medication 1 TABLET: at 09:14

## 2024-04-07 RX ADMIN — CEPHALEXIN 250 MG: 250 CAPSULE ORAL at 06:37

## 2024-04-07 RX ADMIN — FAMOTIDINE 20 MG: 20 TABLET ORAL at 20:35

## 2024-04-07 RX ADMIN — ACETAMINOPHEN 650 MG: 325 TABLET, FILM COATED ORAL at 20:35

## 2024-04-07 RX ADMIN — ACETAMINOPHEN 975 MG: 325 TABLET, FILM COATED ORAL at 14:06

## 2024-04-07 RX ADMIN — VANCOMYCIN HYDROCHLORIDE 125 MG: 125 CAPSULE ORAL at 09:14

## 2024-04-07 RX ADMIN — FAMOTIDINE 20 MG: 20 TABLET ORAL at 09:14

## 2024-04-07 RX ADMIN — CEPHALEXIN 250 MG: 250 CAPSULE ORAL at 14:06

## 2024-04-07 RX ADMIN — FLUTICASONE PROPIONATE 1 SPRAY: 50 SPRAY, METERED NASAL at 15:59

## 2024-04-07 RX ADMIN — VANCOMYCIN HYDROCHLORIDE 125 MG: 125 CAPSULE ORAL at 20:36

## 2024-04-07 RX ADMIN — BACLOFEN 5 MG: 10 TABLET ORAL at 20:36

## 2024-04-07 RX ADMIN — DOCUSATE SODIUM 50 MG AND SENNOSIDES 8.6 MG 2 TABLET: 8.6; 5 TABLET, FILM COATED ORAL at 15:54

## 2024-04-07 RX ADMIN — CEPHALEXIN 250 MG: 250 CAPSULE ORAL at 21:36

## 2024-04-07 RX ADMIN — APIXABAN 5 MG: 5 TABLET, FILM COATED ORAL at 09:14

## 2024-04-07 RX ADMIN — OXYBUTYNIN CHLORIDE 5 MG: 5 TABLET ORAL at 22:33

## 2024-04-07 RX ADMIN — ACETAMINOPHEN 975 MG: 325 TABLET, FILM COATED ORAL at 06:36

## 2024-04-07 RX ADMIN — OXYCODONE HYDROCHLORIDE 5 MG: 5 TABLET ORAL at 09:14

## 2024-04-07 RX ADMIN — OXYCODONE HYDROCHLORIDE 5 MG: 5 TABLET ORAL at 20:35

## 2024-04-07 ASSESSMENT — ACTIVITIES OF DAILY LIVING (ADL)
ADLS_ACUITY_SCORE: 56
ADLS_ACUITY_SCORE: 58
ADLS_ACUITY_SCORE: 56
ADLS_ACUITY_SCORE: 58
ADLS_ACUITY_SCORE: 56
ADLS_ACUITY_SCORE: 56
ADLS_ACUITY_SCORE: 58
ADLS_ACUITY_SCORE: 56
ADLS_ACUITY_SCORE: 58
ADLS_ACUITY_SCORE: 56
ADLS_ACUITY_SCORE: 56

## 2024-04-07 NOTE — PLAN OF CARE
Goal Outcome Evaluation:  4/7/2024 NOC     Cognitive Concerns/ Orientation: A&O x2 disoriented to time and situation  BEHAVIOR & AGGRESSION TOOL COLOR: Green  ABNL VS/O2: VSS on RA  MOBILITY: Assist x2; lift; turned and repositioned Q2hrs and prn; paraplegic   PAIN MANAGMENT: Patient c/o generalized pain - Oxycodone given x1 at HS; also on scheduled Tylenol and Baclofen   DIET: Regular - appetite is good  BOWEL/BLADDER: Colostomy bag, small amount of stool present; Intermittent cath every 6 hours, incontinent of urine also   ABNL LAB/BG: None drawn  DRAIN/DEVICES: R PIV SL  TELEMETRY RHYTHM: NSR  SKIN: Scattered bruises and scrapes. Wound on heels- mepilex in place, Mepilex on coccyx for redness. 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 Patient has paraplegia; otherwise neuros are intact. Per Neurology, no need for further investigations at this time. Vascular Surgery saw patient - no surgical intervention needed at this time. Podiatry signed off. Patient frequently requesting pain medications. Pain service saw patient Friday - oxycodone can be given BID PRN only and now has Tylenol and baclofen scheduled.

## 2024-04-07 NOTE — PLAN OF CARE
Goal Outcome Evaluation:    DATE & TIME: 4/7/2024 4465-6935    Summary: Drug Overdose - Fentanyl and opioids; 4th admission since December    Orientation: AO x2; Alert to himself and place; intermittently confused to time and situation; goofy   BEHAVIOR & AGGRESSION TOOL COLOR: Green  Vitals/Tele: VSS RA  TELEMETRY RHYTHM: NSR  IV Access/drains: PIV SL   Diet: Regular   Mobility: Assist x2; lift; turned and repositioned Q2hrs and prn; paraplegic   GI/: Colostomy bag in place, good output; PRN straight cath, he does at home; Scanned for 660 this morning, Marianela Deshpande straight cathed   Wound/Skin: Scattered bruises and scrapes. Wound on heels- mepilex in place, Mepilex on coccyx for redness. Scattered scrapes on feet - ADRIANA (dressing falls off easily)  Consults: Pain Management has been consulted and patient was agreeable to meeting with them after speaking with Dr. Walsh  Discharge Plan: TBD      See Flow sheets for assessment

## 2024-04-08 VITALS
RESPIRATION RATE: 18 BRPM | HEIGHT: 70 IN | TEMPERATURE: 97.3 F | HEART RATE: 88 BPM | SYSTOLIC BLOOD PRESSURE: 122 MMHG | WEIGHT: 145.94 LBS | DIASTOLIC BLOOD PRESSURE: 76 MMHG | BODY MASS INDEX: 20.89 KG/M2 | OXYGEN SATURATION: 98 %

## 2024-04-08 LAB
ANION GAP SERPL CALCULATED.3IONS-SCNC: 15 MMOL/L (ref 7–15)
BACTERIA BLD CULT: NO GROWTH
BACTERIA BLD CULT: NO GROWTH
BUN SERPL-MCNC: 29.7 MG/DL (ref 8–23)
CALCIUM SERPL-MCNC: 9.1 MG/DL (ref 8.8–10.2)
CHLORIDE SERPL-SCNC: 101 MMOL/L (ref 98–107)
CREAT SERPL-MCNC: 0.64 MG/DL (ref 0.67–1.17)
DEPRECATED HCO3 PLAS-SCNC: 20 MMOL/L (ref 22–29)
EGFRCR SERPLBLD CKD-EPI 2021: >90 ML/MIN/1.73M2
ERYTHROCYTE [DISTWIDTH] IN BLOOD BY AUTOMATED COUNT: 18 % (ref 10–15)
GLUCOSE SERPL-MCNC: 88 MG/DL (ref 70–99)
HCT VFR BLD AUTO: 34 % (ref 40–53)
HGB BLD-MCNC: 11 G/DL (ref 13.3–17.7)
MCH RBC QN AUTO: 24.7 PG (ref 26.5–33)
MCHC RBC AUTO-ENTMCNC: 32.4 G/DL (ref 31.5–36.5)
MCV RBC AUTO: 76 FL (ref 78–100)
PLATELET # BLD AUTO: 347 10E3/UL (ref 150–450)
POTASSIUM SERPL-SCNC: 4.6 MMOL/L (ref 3.4–5.3)
RBC # BLD AUTO: 4.46 10E6/UL (ref 4.4–5.9)
SODIUM SERPL-SCNC: 136 MMOL/L (ref 135–145)
WBC # BLD AUTO: 7.9 10E3/UL (ref 4–11)

## 2024-04-08 PROCEDURE — 250N000013 HC RX MED GY IP 250 OP 250 PS 637: Performed by: STUDENT IN AN ORGANIZED HEALTH CARE EDUCATION/TRAINING PROGRAM

## 2024-04-08 PROCEDURE — 99239 HOSP IP/OBS DSCHRG MGMT >30: CPT | Performed by: INTERNAL MEDICINE

## 2024-04-08 PROCEDURE — 80048 BASIC METABOLIC PNL TOTAL CA: CPT | Performed by: STUDENT IN AN ORGANIZED HEALTH CARE EDUCATION/TRAINING PROGRAM

## 2024-04-08 PROCEDURE — 250N000013 HC RX MED GY IP 250 OP 250 PS 637

## 2024-04-08 PROCEDURE — 250N000013 HC RX MED GY IP 250 OP 250 PS 637: Performed by: PHYSICIAN ASSISTANT

## 2024-04-08 PROCEDURE — 250N000013 HC RX MED GY IP 250 OP 250 PS 637: Performed by: INTERNAL MEDICINE

## 2024-04-08 PROCEDURE — 250N000013 HC RX MED GY IP 250 OP 250 PS 637: Performed by: HOSPITALIST

## 2024-04-08 PROCEDURE — 99222 1ST HOSP IP/OBS MODERATE 55: CPT

## 2024-04-08 PROCEDURE — 99232 SBSQ HOSP IP/OBS MODERATE 35: CPT

## 2024-04-08 PROCEDURE — 36415 COLL VENOUS BLD VENIPUNCTURE: CPT | Performed by: STUDENT IN AN ORGANIZED HEALTH CARE EDUCATION/TRAINING PROGRAM

## 2024-04-08 PROCEDURE — 85027 COMPLETE CBC AUTOMATED: CPT | Performed by: STUDENT IN AN ORGANIZED HEALTH CARE EDUCATION/TRAINING PROGRAM

## 2024-04-08 RX ORDER — BACLOFEN 10 MG/1
5 TABLET ORAL 2 TIMES DAILY
Status: DISCONTINUED | OUTPATIENT
Start: 2024-04-08 | End: 2024-04-08 | Stop reason: HOSPADM

## 2024-04-08 RX ORDER — HYDROXYZINE HYDROCHLORIDE 25 MG/1
25 TABLET, FILM COATED ORAL EVERY 6 HOURS PRN
Status: DISCONTINUED | OUTPATIENT
Start: 2024-04-08 | End: 2024-04-08 | Stop reason: HOSPADM

## 2024-04-08 RX ORDER — ACETAMINOPHEN 325 MG/1
975 TABLET ORAL EVERY 8 HOURS
Qty: 60 TABLET | Refills: 0 | Status: SHIPPED | OUTPATIENT
Start: 2024-04-08

## 2024-04-08 RX ORDER — ACETAMINOPHEN 325 MG/1
975 TABLET ORAL EVERY 8 HOURS
Qty: 60 TABLET | Refills: 0 | Status: SHIPPED | OUTPATIENT
Start: 2024-04-08 | End: 2024-04-08

## 2024-04-08 RX ORDER — VANCOMYCIN HYDROCHLORIDE 125 MG/1
125 CAPSULE ORAL 2 TIMES DAILY
Qty: 16 CAPSULE | Refills: 0 | Status: SHIPPED | OUTPATIENT
Start: 2024-04-08 | End: 2024-04-16

## 2024-04-08 RX ADMIN — VANCOMYCIN HYDROCHLORIDE 125 MG: 125 CAPSULE ORAL at 08:24

## 2024-04-08 RX ADMIN — CEPHALEXIN 250 MG: 250 CAPSULE ORAL at 13:17

## 2024-04-08 RX ADMIN — OXYCODONE HYDROCHLORIDE 5 MG: 5 TABLET ORAL at 08:24

## 2024-04-08 RX ADMIN — ACETAMINOPHEN 975 MG: 325 TABLET, FILM COATED ORAL at 16:44

## 2024-04-08 RX ADMIN — ACETAMINOPHEN 650 MG: 325 TABLET, FILM COATED ORAL at 12:51

## 2024-04-08 RX ADMIN — FLUTICASONE PROPIONATE 1 SPRAY: 50 SPRAY, METERED NASAL at 08:25

## 2024-04-08 RX ADMIN — OXYBUTYNIN CHLORIDE 5 MG: 5 TABLET ORAL at 16:45

## 2024-04-08 RX ADMIN — ACETAMINOPHEN 975 MG: 325 TABLET, FILM COATED ORAL at 08:24

## 2024-04-08 RX ADMIN — BACLOFEN 5 MG: 10 TABLET ORAL at 12:51

## 2024-04-08 RX ADMIN — Medication 1 TABLET: at 08:25

## 2024-04-08 RX ADMIN — FAMOTIDINE 20 MG: 20 TABLET ORAL at 08:24

## 2024-04-08 RX ADMIN — APIXABAN 5 MG: 5 TABLET, FILM COATED ORAL at 08:25

## 2024-04-08 RX ADMIN — OXYBUTYNIN CHLORIDE 5 MG: 5 TABLET ORAL at 08:24

## 2024-04-08 RX ADMIN — CEPHALEXIN 250 MG: 250 CAPSULE ORAL at 06:25

## 2024-04-08 RX ADMIN — ACETAMINOPHEN 975 MG: 325 TABLET, FILM COATED ORAL at 00:45

## 2024-04-08 ASSESSMENT — ACTIVITIES OF DAILY LIVING (ADL)
ADLS_ACUITY_SCORE: 50
ADLS_ACUITY_SCORE: 58
ADLS_ACUITY_SCORE: 52
ADLS_ACUITY_SCORE: 58
ADLS_ACUITY_SCORE: 50
ADLS_ACUITY_SCORE: 55
ADLS_ACUITY_SCORE: 58
ADLS_ACUITY_SCORE: 58
ADLS_ACUITY_SCORE: 52
ADLS_ACUITY_SCORE: 58
ADLS_ACUITY_SCORE: 55
ADLS_ACUITY_SCORE: 52

## 2024-04-08 NOTE — PLAN OF CARE
DATE & TIME: 4/7/2024 [id number]    Summary: Drug Overdose - Fentanyl and opioids; 4th admission since December    Orientation: AO x2; Alert to himself and place; intermittently confused to time and situation;    BEHAVIOR & AGGRESSION TOOL COLOR: Green  Vitals/Tele: VSS RA  TELEMETRY RHYTHM: NSR  Pain Management: PRN tylenol available; also on scheduled Tylenol and Baclofen. Oxycodone twice daily  IV Access/drains: PIV SL   Diet: Regular   Mobility: Assist x2; lift; turned and repositioned Q2hrs and prn; paraplegic   GI/: Colostomy bag in place and changed this shift,  good output; PRN straight cath, he does at home; Scanned for 420, straight cath for 400.   Wound/Skin: Scattered bruises and scrapes. Wound on heels- mepilex in place, Mepilex on coccyx for redness. Scattered scrapes on feet - ADRIANA (dressing falls off easily)  Consults: Pain Management has been consulted and patient was agreeable to meeting with them after speaking with Dr. Walsh  Discharge Plan: TBD

## 2024-04-08 NOTE — PLAN OF CARE
"Goal Outcome Evaluation:                      DATE & TIME: 4/08/2024 1500    Summary: Drug Overdose - Fentanyl and opioids; 4th admission since December    Orientation: A&O x4, anxious about discharging.  BEHAVIOR & AGGRESSION TOOL COLOR: Green  Vitals/Tele: VSS RA  TELEMETRY RHYTHM: NSR, Tele Dc'd.  IV Access/drains: PIV SL. Discontinued   Diet: Regular   Pain management: PRN and scheduled Tylenol given for lower back pain and bladder spasms.  Mobility: Assist x2; lift; weight shifting himself, good upper body strength; paraplegic. Refused getting up to chair today.  GI/: Colostomy bag in place; good output; PRN straight cath Q 6 hrs.; straight cath x2 this shift.   Wound/Skin: Scattered bruises and scrapes. Wound on heels- mepilex in place, Mepilex on coccyx for redness. Scattered scrapes and scabes  on feet - ADRIANA   Consults: Pain Management following and making adjustments to his meds. Seen by psych as well to help with discharge recommendation and address right eye on fentanyl.  Discharge Plan: Discharge pending plan and consult decision. Pt continues to deny using Fentanyl. Pt says he was given a \"baggy of muscle relaxants\" from a friend but was not aware that there could have been an illicit drug in them.        "

## 2024-04-08 NOTE — CONSULTS
"      Initial Psychiatric Consult   Consult date: April 8, 2024         Reason for Consult, requesting source:    Polysubstance abuse, recent overdose, decisional capacity  Requesting source: Hospitalist    Labs and imaging reviewed. Discussed with nursing.        HPI:   Jose Lopez is a 63 year old male with PMH paraplegia, neurogenic bowel and bladder, recent UTIs due to self catheterization admitted on 4/3/2024 with suspected toxic encephalopathy due to drug overdose. He has had several prior hospital admissions for drug overdoses, most recently in December 2023.    I met with Ramana in his room, he is initially quite worked up over recent conversation with another provider here, speech is pressured initially but slows to normal rate as he calms down. He is aware that he was brought in to the hospital when he was found unresponsive, but denies that he had overdosed. He denies taking fentanyl to his knowledge. He states that on Tuesday his friend gave him a baggy of \"muscle relaxers\" which he claims was prescribed to the friend and came from a prescription pill bottle. He does not know what medication this was supposed to be, states he trusts his friend and did not suspect that he would give him something illicit. He states he took one pill Tuesday night, then went to sleep and woke up Wednesday morning. Thinks that whoever called EMS \"overreacted\" and does not think that he had overdosed or was ever unresponsive. He does admit to a history of substance abuse but insists that he has been compliant with agreement with his PCP to abstain from all illicit substances and only take opiates as prescribed, and that he has follow up appointment scheduled tomorrow.    He wants to be discharged today, even if he has to sign out AMA. He is aware that TCU is being recommended, and is able to describe his limitations as well as how he manages to  care for self at home. States he has several people that come out regularly to " check on him and help out, such as home health and friends. He states he previously obtained severe wounds in a TCU setting that have still yet to heal, which is why he does not wish to return to a TCU due to perceived poor care. He is agreeable to home health or outpatient services as alternative to TCU.         Past Psychiatric History:   He denies psychiatric history.        Substance Use and History:   History of polysubstance abuse, including opioids, meth, cannabis, benzos, remote history of alcohol abuse but states has not had any alcohol in many years.        Past Medical History:   PAST MEDICAL HISTORY:   Past Medical History:   Diagnosis Date    Benzodiazepine dependence (H)     Muscle spasticity     Neurogenic bladder     Neurogenic bowel     Paraplegia (H)     Sepsis (H)     from UTI    Spinal cord injury at T9 level (H) 1994    per chart review    Substance abuse (H)     UTI (urinary tract infection)        PAST SURGICAL HISTORY:   Past Surgical History:   Procedure Laterality Date    BACK SURGERY      CHOLECYSTECTOMY      EXAM UNDER ANESTHESIA RECTUM N/A 5/1/2019    Procedure: EXAM UNDER ANESTHESIA, RECTUM;  Surgeon: Tiburcio Barros MD;  Location:  OR    FISTULOTOMY RECTUM N/A 5/1/2019    Procedure: FISTULOTOMY AND REMOVAL OF ANAL SKIN TAGS;  Surgeon: Tiburcio Barros MD;  Location:  OR    ORTHOPEDIC SURGERY               Family History:   FAMILY HISTORY: No family history on file.        Social History:   SOCIAL HISTORY:   Social History     Tobacco Use    Smoking status: Never    Smokeless tobacco: Never   Substance Use Topics    Alcohol use: No            Physical ROS:   The 10 point Review of Systems is negative other than noted in the HPI or here.    Review Of Systems  Skin: wounds on toes, legs  Eyes: negative  Ears/Nose/Throat: negative  Respiratory: No shortness of breath, dyspnea on exertion, cough, or hemoptysis  Cardiovascular: negative  Gastrointestinal: colostomy, recent constipation  improved  Genitourinary: neurogenic bladder self caths  Musculoskeletal: chronic back pain  Neurologic: paraplegic  Psychiatric: agitation  Hematologic/Lymphatic/Immunologic: negative  Endocrine: negative          Medications:     Current Facility-Administered Medications   Medication Dose Route Frequency Provider Last Rate Last Admin    acetaminophen (TYLENOL) tablet 975 mg  975 mg Oral Q8H Britt Hatfield APRN CNP   975 mg at 04/08/24 0824    apixaban ANTICOAGULANT (ELIQUIS) tablet 5 mg  5 mg Oral BID Rachelle Chacko DO   5 mg at 04/08/24 0825    baclofen (LIORESAL) half-tab 5 mg  5 mg Oral BID Britt Hatfield APRN CNP   5 mg at 04/08/24 1251    cephALEXin (KEFLEX) capsule 250 mg  250 mg Oral Q8H Desi Naqvi PA-C   250 mg at 04/08/24 1317    famotidine (PEPCID) tablet 20 mg  20 mg Oral BID Nj Walsh MD   20 mg at 04/08/24 0824    fluticasone (FLONASE) 50 MCG/ACT spray 1 spray  1 spray Both Nostrils Daily Nj Walsh MD   1 spray at 04/08/24 0825    multivitamin w/minerals (THERA-VIT-M) tablet 1 tablet  1 tablet Oral Daily Nj Walsh MD   1 tablet at 04/08/24 0825    oxyBUTYnin (DITROPAN) tablet 5 mg  5 mg Oral TID Diane Jamil MD   5 mg at 04/08/24 0824    sodium chloride (PF) 0.9% PF flush 3 mL  3 mL Intracatheter Q8H Rachelle Chacko DO   3 mL at 04/08/24 1317    vancomycin (VANCOCIN) capsule 125 mg  125 mg Oral BID Nj Walsh MD   125 mg at 04/08/24 0824              Allergies:     Allergies   Allergen Reactions    Sertraline Other (See Comments)     Other reaction(s): Gastrointestinal  Other reaction(s): Gastrointestinal          Labs:     Recent Results (from the past 48 hour(s))   Basic metabolic panel    Collection Time: 04/07/24  3:46 PM   Result Value Ref Range    Sodium 135 135 - 145 mmol/L    Potassium 4.6 3.4 - 5.3 mmol/L    Chloride 99 98 - 107 mmol/L    Carbon Dioxide (CO2) 20 (L) 22 - 29  "mmol/L    Anion Gap 16 (H) 7 - 15 mmol/L    Urea Nitrogen 29.1 (H) 8.0 - 23.0 mg/dL    Creatinine 0.70 0.67 - 1.17 mg/dL    GFR Estimate >90 >60 mL/min/1.73m2    Calcium 8.8 8.8 - 10.2 mg/dL    Glucose 93 70 - 99 mg/dL   CBC with platelets    Collection Time: 04/07/24  3:46 PM   Result Value Ref Range    WBC Count 10.2 4.0 - 11.0 10e3/uL    RBC Count 4.22 (L) 4.40 - 5.90 10e6/uL    Hemoglobin 10.3 (L) 13.3 - 17.7 g/dL    Hematocrit 31.8 (L) 40.0 - 53.0 %    MCV 75 (L) 78 - 100 fL    MCH 24.4 (L) 26.5 - 33.0 pg    MCHC 32.4 31.5 - 36.5 g/dL    RDW 17.8 (H) 10.0 - 15.0 %    Platelet Count 340 150 - 450 10e3/uL   Basic metabolic panel    Collection Time: 04/08/24 12:44 PM   Result Value Ref Range    Sodium 136 135 - 145 mmol/L    Potassium 4.6 3.4 - 5.3 mmol/L    Chloride 101 98 - 107 mmol/L    Carbon Dioxide (CO2) 20 (L) 22 - 29 mmol/L    Anion Gap 15 7 - 15 mmol/L    Urea Nitrogen 29.7 (H) 8.0 - 23.0 mg/dL    Creatinine 0.64 (L) 0.67 - 1.17 mg/dL    GFR Estimate >90 >60 mL/min/1.73m2    Calcium 9.1 8.8 - 10.2 mg/dL    Glucose 88 70 - 99 mg/dL   CBC with platelets    Collection Time: 04/08/24 12:44 PM   Result Value Ref Range    WBC Count 7.9 4.0 - 11.0 10e3/uL    RBC Count 4.46 4.40 - 5.90 10e6/uL    Hemoglobin 11.0 (L) 13.3 - 17.7 g/dL    Hematocrit 34.0 (L) 40.0 - 53.0 %    MCV 76 (L) 78 - 100 fL    MCH 24.7 (L) 26.5 - 33.0 pg    MCHC 32.4 31.5 - 36.5 g/dL    RDW 18.0 (H) 10.0 - 15.0 %    Platelet Count 347 150 - 450 10e3/uL          Physical and Psychiatric Examination:     /73 (BP Location: Right arm)   Pulse 64   Temp 97.7  F (36.5  C) (Oral)   Resp 18   Ht 1.778 m (5' 10\")   Wt 66.2 kg (145 lb 15.1 oz)   SpO2 100%   BMI 20.94 kg/m    Weight is 145 lbs 15.11 oz  Body mass index is 20.94 kg/m .    Physical Exam:  I have reviewed the physical exam as documented by by the medical team and agree with findings and assessment and have no additional findings to add at this time.    Mental Status " "Exam:    Appearance: awake, alert and slightly unkempt  Attitude:  cooperative  Eye Contact:  good  Mood:   irritable  Affect:  mood congruent  Speech:  pressured speech and slows to normal rate as he calms down  Psychomotor Behavior:  no evidence of tardive dyskinesia, dystonia, or tics  Thought Process:  linear and goal oriented  Associations:  no loose associations  Thought Content:  no evidence of suicidal ideation or homicidal ideation and no evidence of psychotic thought  Insight:  fair  Judgement:  fair  Oriented to:  time, person, and place  Attention Span and Concentration:  fair  Recent and Remote Memory:  fair               DSM-5 Diagnosis:   Opioid use disorder  Alcohol use disorder, in remission  Benzodiazepine use disorder by history  Delirium, improved  Suspected opioid overdose          Assessment:   Jose Lopez is a 63 year old male with a history of chronic pain and opioid dependence who was admitted with suspect opioid overdose based on being found unresponsive with \"drug paraphernalia\" and unknown substances being found near him. He did not have robust response to narcan. UDS was positive for fentanyl and cannabis, negative for opiates despite being prescribed opiate pain medication.     He greatly minimizing his substance use. He does admit that he had taken an unknown pill provided by a friend, supposedly a \"muscle relaxer\" that he took because he ran out of prescription opiates and was in severe pain. He denies taking more than one of these pills. He does not plan to take these pills again though states he does have a baggy of them at home. Denies any recent illicit drug use to his knowledge. Does admit to past substance abuse as recently as December 2023, states he has been sober for several months and that he has been compliant with drug screens as part of agreement with PCP who is prescribing opiates. He plans to continue following up with this PCP but tells me he no longer plans to take " "opiates because the pain is now \"gone\" and he no longer wants to take any opiates. He is open to doing outpatient CD treatment.    Another aspect of this consult was decisional capacity to refuse TCU. He is able to express understanding of his limitations, the recommendation for TCU, the risks/benefits of both choosing TCU vs home, and is able to express clear and consistent choice to go home with home care and/or outpatient care if recommended. He does appear to have decisional capacity to refuse TCU.    He states he is planning to be moving to [place] to be near his sister Keke soon, and states that we can contact her for collateral. Attempted to call her at the number he provided (which matches what is in his emergency contact), however the number is not in service.    Addendum 1630: Notified that sister's phone is working, I tried again and was able to get through to her. She is not able to speak to how he has been caring for self at home, she does not know much about his day to day. She does report concern for his long-standing \"pain pill addiction\" though she adds that he doesn't believe he has a problem. In the past he would call a friend to have street drugs brought to him when he ran out of pain pills. She does not know if he has been doing this recently, but states she would not be surprised. She does not know who will be helping him out if he were to return home- [facility] [place] [facility] services and friend that was living in the house has just moved out so he has limited support- she is planning to call him to try to convince him to go to TCU. Discussed with hospitalist.          Summary of Recommendations:   He DOES appear to have decisional capacity to refuse TCU.  He seems intent to leave today, even if AMA, but if willing to stay recommend placing CD consult as he does express willingness to do OP CD treatment.      Gayathri Staton, ELLEN CNP    Consult/Liaison Psychiatry   M " [facility]    Contact information available via [facility] Paging/Directory  If I am not available, then [facility] CL line ([phone]) should know who is covering our consult service.

## 2024-04-08 NOTE — PHARMACY-ADMISSION MEDICATION HISTORY
Pharmacist Admission Medication History    Admission medication history is complete. The information provided in this note is only as accurate as the sources available at the time of the update.    Information Source(s): Patient and CareEverywhere/SureScripts via in-person    Pertinent Information:   Patient states he was admitted to [facility] prior to admission at [facility], appears they prescribed hydroxyzine and Norco at discharge (patient has not yet started these medications)    Changes made to PTA medication list:  Added: None  Deleted: baclofen (20 mg tabs), cyclobenzaprine, duloxetine, gabapentin, pregabalin, Xarelto, Bactrim, simethicone   Changed: oxycodone, Miralax    Allergies reviewed with patient and updates made in EHR: no    Medication History Completed By: CAPO MANZO RPH 4/8/2024 10:41 AM    PTA Med List   Medication Sig Note Last Dose    apixaban ANTICOAGULANT (ELIQUIS) 5 MG tablet Take 1 tablet (5 mg) by mouth 2 times daily 4/3/2024: Last filled 3/3/24 #30ds #60     baclofen (LIORESAL) 5 mg TABS half-tab Take 5 mg by mouth 2 times daily 4/3/2024: Last filled 3/29/24 #7ds #21     ferrous sulfate (FEROSUL) 325 (65 Fe) MG tablet Take 325 mg by mouth daily (with breakfast) 4/3/2024: Last filled 9/2023 #90ds     folic acid (FOLVITE) 1 MG tablet Take 1 mg by mouth daily 4/3/2024: Last filled 9/2023 #90ds      HYDROcodone-acetaminophen (NORCO)  MG per tablet Take 1 tablet by mouth every 4 hours as needed for severe pain 4/8/2024: Filled 4/2/24 #9ds #54, instructions on Rx not to start until 4/3/24 (pt did not start PTA)       hydrOXYzine HCl (ATARAX) 25 MG tablet Take 25 mg by mouth every 8 hours as needed 4/8/2024: Last filled 4/2/24 #20ds #60 (pt did not start PTA)     naloxone (NARCAN) 4 MG/0.1ML nasal spray Spray 4 mg into one nostril alternating nostrils as needed for opioid reversal every 2-3 minutes until assistance arrives 4/3/2024: Last filled 12/2023 Unknown    oxybutynin (DITROPAN) 5 MG  tablet Take 5 mg by mouth 3 times daily 4/3/2024: Last filled 4/1/24 #30ds #120     oxyCODONE (ROXICODONE) 5 MG tablet Take 10 mg by mouth every 4 hours as needed for pain 4/3/2024: 5 mg tablets last filled 3/29/24 #6ds #48, directions to take 10 mg q6h PRN to end 4/2/24     polyethylene glycol (MIRALAX) 17 GM/Dose powder Take 17 g by mouth daily as needed for constipation (Patient taking differently: Take 17 g by mouth 2 times daily)      senna-docusate (SENOKOT-S/PERICOLACE) 8.6-50 MG tablet Take 1 tablet by mouth 2 times daily      vancomycin (VANCOCIN) 125 MG capsule Take 1 capsule (125 mg) by mouth 2 times daily 4/3/2024: Last filled 3/26/24 #9ds #9

## 2024-04-08 NOTE — PLAN OF CARE
DATE & TIME: 4/07/2024 1900-4/08/24 7509    Summary: Drug Overdose - Fentanyl and opioids; 4th admission since December    Orientation: A&O x2; A&O to self and place; intermittently confused to time and situation; anxious, cooperative.  BEHAVIOR & AGGRESSION TOOL COLOR: Green  Vitals/Tele: VSS RA  TELEMETRY RHYTHM: NSR  IV Access/drains: PIV SL   Diet: Regular   Pain management: PRN Oxycodone given x1 and PRN and scheduled Tylenol given for groin pain.  Mobility: Assist x2; lift; weight shifting himself, good upper body strength; paraplegic   GI/: Colostomy bag in place; good output; PRN straight cath Q 6 hrs.; straight cath x1 this shift.   Wound/Skin: Scattered bruises and scrapes. Wound on heels- mepilex in place, Mepilex on coccyx for redness. Scattered scrapes on feet - ADRIANA (dressing falls off easily)  Consults: Pain Management following, plan to meet with patient again today.  Discharge Plan: Discharge pending plan and consults

## 2024-04-08 NOTE — PROGRESS NOTES
"[facility]    Medicine Progress Note - Hospitalist Service    Date of Admission:  4/3/2024    Assessment & Plan   Jose Lopez is a 63 year old male with PMH paraplegia, neurogenic bowel and bladder, recent UTIs due to self catheterization admitted on 4/3/2024 with suspected toxic encephalopathy due to drug overdose.     Toxic encephalopathy, drug overdose Resolved  Acute hypoxic, hypercapneic respiratory failure Resolved  Respiratory acidosis  Sinus bradycardia  # Chronic opiate use  Found slumped over with \"drug paraphernalia\" around him at his home. Received narcan in ED after his UDS was positive for fentanyl and cannabinoids. Was more agitated after narcan, but didn't really wake up. Received 1L IVF. Was trialed on bipap for 2 hours to see if he would wake up more, but he did not wake up--however HR and BP did improve.  *4/3 CT head: no acute intracranial process  *Of note this is very similar presentation to 9/8/23 hospitalization where he was found down, given narcan and then remained encephalopathic for 20h until he came back around    Patient's mental status currently at his baseline.  Neurology consult appreciated and neurology has signed off and the patient  -Pain management consulted and pain management added Tylenol 975 mg every 8 hours  vistaril 25 mg every 6 hours prn .  5 mg baclofen at night  -As per pain management can increase baclofen for 35 mg 3 times daily if mental status improves  -Oxycodone 2.5 to 5 mg decreased to twice daily as needed by pain management and does not recommend increasing dose  -Patient has been having intermittent confusion and will continue neurochecks  -Hold PTA gabapentin  -Pain management planning to start him on Suboxone on Monday  -Patient agreeable to meet pain management on Monday to discuss about suboxone initation  -Patient mental status back to baseline           Coccyx wound and toe abrasions  Pressure Injury Stage: either Stage 2 or " 3 deferring definitive staging until wound base has evolved, present on admission   # Cellulitis  # Toe wounds  -Consult wound RN for eval  -Infectious consult appreciated on Zosyn and vancomycin stoppedd  - Keflex for mild cellulitis started with infectious disease  -Follow blood cultures  -Wound care following   -Podiatry consulted and ordered foot x-rays  -Vascular surgery has been consulted and ABIs done today revealed normal exam  -Vascular surgery recommending no surgical intervention.  -Podiatry following and not recommending any surgical recommendation.  I reviewed podiatry notes on April 6, 2024  -Wound care following    Elevated troponin, suspect type 2 NSTEMI  -Troponins have been flat with no anginal pain and this is likely due to demand  T9 Paraplegia (HRC)  Neurogenic bowel  Chronic constipation  - ostomy RN consulted  - await confirmation of current bowel regimen, resume as able     Recent sepsis/UTI due to self cath  Admitted at [facility] 3/20-3/27/24. Enterococcus treated with ampicillin for 7 days, finished treatment on 3/25  4/3 UA not concerning for infection  - continue straight cath q6h  -Infectious disease consulted and recommended only Keflex for mild cellulitis        History of Cdiff colitis  -monitor stools  -Continue oral vancomycin prophylaxis due to history of C. Difficile        History of DVT (deep vein thrombosis)  Resume PTA DOAC, seems he recently switched from xarelto to  eliquis  -Continue eliquis  -     Esophagitis  Noted during 3/20 admit at [facility]  - continue famotidine BID             Diet: Combination Diet Regular Diet Adult  Snacks/Supplements Adult: Magic Cup; With Meals    DVT Prophylaxis: Pneumatic Compression Devices  Dover Catheter: Not present  Lines: None     Cardiac Monitoring: ACTIVE order. Indication: Bradycardias (48 hours)  Code Status: Full Code      Clinically Significant Risk Factors                          # Moderate Malnutrition: based on  nutrition assessment           Disposition Plan     Expected Discharge Date: 04/08/2024      Destination: inpatient rehabilitation facility              Nj Walsh MD  Hospitalist Service  [facility]  Securely message with Geni (more info)  Text page via [employer]/Directory   ______________________________________________________________________    Interval History   Patient seen and examined at bedside.  Patient alert and oriented.  Initially patient said that he does not want Suboxone but I asked him to discuss with pain management and he was agreeable to meet pain management on Monday   Having some nasal congestion.   Physical Exam   Vital Signs: Temp: 97.9  F (36.6  C) Temp src: Oral BP: 112/68 Pulse: 73   Resp: 18 SpO2: 99 % O2 Device: None (Room air)    Weight: 151 lbs 14.35 oz    Physical Exam  Cardiovascular:      Rate and Rhythm: Normal rate and regular rhythm.   Pulmonary:      Effort: Pulmonary effort is normal. No respiratory distress.   Abdominal:      Palpations: Abdomen is soft.      Tenderness: There is no abdominal tenderness.   Musculoskeletal:      Comments: Bilateral foot wounds          Medical Decision Making       38 MINUTES SPENT BY ME on the date of service doing chart review, history, exam, documentation & further activities per the note.      Data     I have personally reviewed the following data over the past 24 hrs:    10.2  \   10.3 (L)   / 340     135 99 29.1 (H) /  93   4.6 20 (L) 0.70 \       Imaging results reviewed over the past 24 hrs:   No results found for this or any previous visit (from the past 24 hour(s)).

## 2024-04-08 NOTE — PROVIDER NOTIFICATION
MD Notification    Notified Person: MD    Notified Person Name: Dr. Jamil    Notification Date/Time: 4/04/24 2203    Notification Interaction: Vocera page    Purpose of Notification: Pt asking if he could have his PTA oxybutynin    Orders Received: Oxybutynin 5 mg 3 times daily    Comments:

## 2024-04-08 NOTE — PROGRESS NOTES
Discharge    Patient discharged to Home via Pt's own wheelchair with [facility] transport  Care plan note: See POC note    Listed belongings gathered and given to patient (including from security/pharmacy). Yes  Care Plan and Patient education resolved: Yes  Prescriptions if needed, hard copies sent with patient  Yes  Medication Bin checked and emptied on discharge Yes  SW/care coordinator/charge RN aware of discharge: Yes

## 2024-04-08 NOTE — PROGRESS NOTES
[facility] ACUTE INPATIENT PAIN SERVICE    [facility], [facility], [facility], [facility], [place]   PAIN FOLLOW UP     Assessment/Plan:  Jose Lopez is a 63 year old male who was admitted on 4/3/2024.  Pain Service is asked to see the patient for evaluation with history of chronic pain, opiate abuse.  Admitted with altered mental status, he came via EMS, patient was unable to provide history.  Physical Therapist found him in his home slumped over and unresponsive, surrounded by drug paraphernalia and medications.  Patient is paraplegic and EMS reports that this has occurred before in previous encounters.    UDS was positive for fentanyl and cannabinoids. Was more agitated after narcan, but didn't really wake up. Received 1L IVF. Was trialed on bipap for 2 hours to see if he would wake up more, but he did not wake up--however HR and BP did improve.   History of paraplegia, neurogenic bowel and bladder, recent UTIs due to self catheterization multiple admissions for sepsis as well as significant opioid use and potential polysubstance use. Multiple previous potential overdoses, including 9/8/2023 and in 2018.      shows regular prescriptions for opioids.  55 total controlled substance prescriptions this past year.    Does not appear he has a consistent prescriber.       Over past 24 hours he received:  2 (5mg) oxycodone, 1 (5mg) baclofen, 2 (975mg) tylenol, 1 (650mg) tylenol       PLAN:  Chronic back and leg pain in the setting of recent substance overdose and chronic opioid use. Drug screen positive for fentanyl and cannabinoids. Unexpected negative result on opiate screening, consider opioid diversion. No changes to pain, continues to have chronic back, leg, and groin pain. Declined initiation of suboxone. Pain team is signing off.   Multimodal Medication Therapy:   Adjuvants: tylenol 975mg every 8 hours, vistaril 25 mg every 6 hours prn  Baclofen increased to 5mg bid. May continue to increase to 5mg tid prn  "if needed. Increase in spacticity, hyperthermia, anxiety, tremors, agitation, and visual changes may indicate withdrawal.  No NSAIDs due to low hemoglobin  Opioids: oxycodone 2.5-5 mg bid prn, do not recommend increasing dosing.  No IV medications recommended   Patient is not a good candidate for on going opoid use due to multiple overdoses and polysubstance abuse including fentanyl and meth, both well documented.  Declined initiation of suboxone.  Non-medication interventions- Ice, heat prn   Constipation Prophylaxis- daily stool softener/laxative   Follow up /Discharge Recommendations - We recommend prescribing the following at the time of discharge: baclofen 5mg and acetaminophen. No opioids at discharge if going home. If going to TCU, can continue with 2.5-5mg oxycodone bid.  Follow up with outpatient providers.    Subjective:  Denies nausea, vomiting, diarrhea, constipation, chest pain, shortness of breath. Endorsing chronic lower back and groin pain at a 10/10, moments later reporting 8/10 pain to nurse. Additionally, stated baclofen is unhelpful then asked me to increase dosing.  Patient declining initiation of suboxone, states it is ineffective and would not like to try it again. Advised patient that he cannot continue short acting opioids as a long term pain management plan, and to follow up with outpatient providers.     Discussed discharge plan: patient is aware he will not be sent home with any oxycodone. He stated \"that's fine, I want to be off of them anyways\".       History   Drug Use No         Tobacco Use      Smoking status: Never      Smokeless tobacco: Never      Objective:  Vital signs in last 24 hours:  B/P: 121/73, T: 97.7, P: 64, R: 16   Blood pressure 121/73, pulse 64, temperature 97.7  F (36.5  C), temperature source Oral, resp. rate 16, height 1.778 m (5' 10\"), weight 66.2 kg (145 lb 15.1 oz), SpO2 100%.      Review of Systems:   As per subjective, all others negative.    Physical " Exam  General: in no apparent distress, laying in bed appears comfortable  HEENT: Head normocephalic atraumatic, oral mucosa moist. Sclerae anicteric  Resp: Respirations unlabored, on room air  Skin: Warm and dry. Bilateral foot wounds on toes  Extremities: No peripheral edema, moves upper extremities spontaneously, no movement in bilateral lower extremities   Psych: Normal affect, pleasant   Neuro: Alert and oriented x3    Imaging:  Personally Reviewed.    Results for orders placed or performed during the hospital encounter of 04/03/24   CT Head w/o Contrast    Impression    IMPRESSION:  1.  No acute intracranial process.   US MARIELENA Doppler No Exercise    Impression    IMPRESSION: Normal MARIELENA examination.    MARIELENA CRITERIA:  >1.4 NC  0.95-1.4 Normal  0.90 - 0.94 Mild  0.5 - 0.89 Moderate  0.2 - 0.49 Severe  <0.2 Critical    GIANNA MILTON MD         SYSTEM ID:  [id number]   XR Foot Bilateral G/E 3 Views    Impression    IMPRESSION: Diffuse osseous demineralization. Mild degenerative  arthrosis involving multiple joints of the midfoot and forefoot  bilaterally. No acute fracture or evidence of osteomyelitis. Partial  visualization of an intramedullary luis manuel within the left tibia.    STELLA CLEMENTS DO         SYSTEM ID:  [id number]        Lab Results:  Personally Reviewed.   Last Comprehensive Metabolic Panel:  Sodium   Date Value Ref Range Status   04/07/2024 135 135 - 145 mmol/L Final     Comment:     Reference intervals for this test were updated on 09/26/2023 to more accurately reflect our healthy population. There may be differences in the flagging of prior results with similar values performed with this method. Interpretation of those prior results can be made in the context of the updated reference intervals.    12/05/2018 140 133 - 144 mmol/L Final     Potassium   Date Value Ref Range Status   04/07/2024 4.6 3.4 - 5.3 mmol/L Final   04/29/2019 4.2 3.5 - 5.1 mmol/L Final     Chloride   Date Value Ref Range Status    04/07/2024 99 98 - 107 mmol/L Final   12/05/2018 109 94 - 109 mmol/L Final     Carbon Dioxide   Date Value Ref Range Status   12/05/2018 23 20 - 32 mmol/L Final     Carbon Dioxide (CO2)   Date Value Ref Range Status   04/07/2024 20 (L) 22 - 29 mmol/L Final     Anion Gap   Date Value Ref Range Status   04/07/2024 16 (H) 7 - 15 mmol/L Final   12/05/2018 8 3 - 14 mmol/L Final     Glucose   Date Value Ref Range Status   04/07/2024 93 70 - 99 mg/dL Final   12/05/2018 94 70 - 99 mg/dL Final     GLUCOSE BY METER POCT   Date Value Ref Range Status   04/03/2024 88 70 - 99 mg/dL Final     Urea Nitrogen   Date Value Ref Range Status   04/07/2024 29.1 (H) 8.0 - 23.0 mg/dL Final   12/05/2018 23 7 - 30 mg/dL Final     Creatinine   Date Value Ref Range Status   04/07/2024 0.70 0.67 - 1.17 mg/dL Final   04/29/2019 0.76 0.73 - 1.18 mg/dL Final     GFR Estimate   Date Value Ref Range Status   04/07/2024 >90 >60 mL/min/1.73m2 Final   04/29/2019 >60 >60 ml/min/1.73m2 Final     Calcium   Date Value Ref Range Status   04/07/2024 8.8 8.8 - 10.2 mg/dL Final   12/05/2018 8.8 8.5 - 10.1 mg/dL Final        UA:   Amphetamine Qual Urine   Date Value Ref Range Status   12/03/2018 Positive (A) NEG^Negative Final     Comment:     Cutoff for a positive amphetamine is greater than 500 ng/mL. This is an   unconfirmed screening result to be used for medical purposes only.       Amphetamines Urine   Date Value Ref Range Status   04/03/2024 Screen Negative Screen Negative Final     Comment:     Cutoff for a negative amphetamine is less than 500 ng/mL.     Barbiturates Qual Urine   Date Value Ref Range Status   12/03/2018 Negative NEG^Negative Final     Comment:     Cutoff for a negative barbiturate is 200 ng/mL or less.     Barbituates Urine   Date Value Ref Range Status   04/03/2024 Screen Negative Screen Negative Final     Comment:     Cutoff for a negative barbiturate is less than 200 ng/mL.     Benzodiazepine Qual Urine   Date Value Ref Range  Status   12/03/2018 Negative NEG^Negative Final     Comment:     Cutoff for a negative benzodiazepine is 200 ng/mL or less.     Cannabinoids Qual Urine   Date Value Ref Range Status   12/03/2018 Positive (A) NEG^Negative Final     Comment:     Cutoff for a positive cannabinoid is greater than 50 ng/mL. This is an   unconfirmed screening result to be used for medical purposes only.       Cannabinoids Urine   Date Value Ref Range Status   04/03/2024 Screen Positive (A) Screen Negative Final     Comment:     Cutoff for a positive cannabinoid is 50 ng/mL or greater.   This is an unconfirmed screening result to be used for medical purposes only.     Cocaine Qual Urine   Date Value Ref Range Status   12/03/2018 Negative NEG^Negative Final     Comment:     Cutoff for a negative cocaine is 300 ng/mL or less.     Cocaine Urine   Date Value Ref Range Status   04/03/2024 Screen Negative Screen Negative Final     Comment:     Cutoff for a negative cocaine is less than 300 ng/mL.     Opiates Qualitative Urine   Date Value Ref Range Status   12/03/2018 Positive (A) NEG^Negative Final     Comment:     Cutoff for a positive opiate is greater than 300 ng/mL. This is an unconfirmed   screening result to be used for medical purposes only.       Opiates Urine   Date Value Ref Range Status   04/03/2024 Screen Negative Screen Negative Final     Comment:     Cutoff for a negative opiate is less than 300 ng/mL.     PCP Qual Urine   Date Value Ref Range Status   12/03/2018 Negative NEG^Negative Final     Comment:     Cutoff for a negative PCP is 25 ng/mL or less.     PCP Urine   Date Value Ref Range Status   04/03/2024 Screen Negative Screen Negative Final     Comment:     Cutoff for a negative PCP is less than 25 ng/mL.      Please see A&P for additional details of medical decision making.  MANAGEMENT DISCUSSED with the following over the past 24 hours: RN, Hospitalist    NOTE(S)/MEDICAL RECORDS REVIEWED over the past 24 hours:    -Podiatry: consulted for bilateral lower extremity wounds, no indication for surgical intervention. Continue wound cares per WOC RN.   -Medicine: will be discharging to inpatient rehabilitation facility , agreeable to discussing re-initiation of suboxone   Tests REVIEWED in the past 24 hours:  - BMP  - CBC  Medical complexity over the past 24 hours:  - Prescription DRUG MANAGEMENT performed      Britt HUGHES, FNP-BC  Acute Care Pain Management Program   Hours of pain coverage Mon-Fri [id number], afterhours please call the house officer    [facility] (AR[place], [place], [place], [place])   Page via [employer]- Click HERE to page Britt or [employer]i text web console -Click for Geni

## 2024-04-08 NOTE — DISCHARGE SUMMARY
[facility]  Hospitalist Discharge Summary      Date of Admission:  4/3/2024  Date of Discharge:  4/8/2024  6:48 PM  Discharging Provider: Swathi De Anda MD  Discharge Service: Hospitalist Service    Discharge Diagnoses   Toxic encephalopathy, drug overdose- Resolved  Acute hypoxic, hypercapneic respiratory failure Resolved  Respiratory acidosis  Sinus bradycardia  Chronic opiate use  NSTEMI  Coccyx wound and toe abrasions  Pressure Injury Stage rt heel: either Stage 2 or 3 deferring definitive staging until wound base has evolved, present on admission   Cellulitis of LE  Toe wounds  T9 Paraplegia (HRC)  Neurogenic bladder and bowel  Chronic constipation   H/o recurrent C diff colitis  H/o DVT- on Eliquis  H/o Esophagitis     Clinically Significant Risk Factors     # Moderate Malnutrition: based on nutrition assessment      Follow-ups Needed After Discharge   Follow-up Appointments     Follow-up and recommended labs and tests       1.  Follow up at the wound clinic for sacral and lower extremity wounds.    Wound Clinic Referral Options:     [facility] [place]   [address] [place] [address]   P - [phone]       2.   [facility] [place]   [address] [address]   P - [phone]   F  [phone]    3.   [facility]   [address] [place] [address]   P - [phone]   F  [phone]        2.  Follow up with one of the following services for routine foot care:    Here is a list of routine foot care resources, which includes toenail   trimming and callus/corn management.     This is not a referral. It is your responsibility to contact the   organization and your insurance to confirm cost and coverage.      ROUTINE FOOT CARE (NAIL TRIMMING / CALLUSES)      [facility]  [phone]   [facility]  [phone]  Multiple  "locations  [facility]  [phone] Dr. Ortiz and Dr. Posadas  [address]  [place] [address]  [phone]  [facility]  [phone]  [email or web address]        Follow-up and recommended labs and tests       Follow up with primary care provider, Sung Hollis, within 7 days   for hospital follow- up.            Unresulted Labs Ordered in the Past 30 Days of this Admission       Date and Time Order Name Status Description    4/3/2024  4:42 PM Blood Culture Peripheral Blood Preliminary     4/3/2024  4:42 PM Blood Culture Peripheral Blood Preliminary         These results will be followed up by PCP    Discharge Disposition   Discharged to home  Condition at discharge: Satisfactory    Hospital Course   Jose Lopez is a 63 year old male with PMH paraplegia, neurogenic bowel and bladder, recent UTIs due to self catheterization admitted on 4/3/2024 with suspected toxic encephalopathy due to drug overdos; for a detaied HPI= please refer to H&P done by Dr Rachelle Chacko on 04/03/2024     Toxic encephalopathy, drug overdose- resolved  Acute hypoxic, hypercapneic respiratory failure- resolved  Respiratory acidosis  Sinus bradycardia, resolved  Chronic opiate use  Found slumped over with \"drug paraphernalia\" around him at his home. Received narcan in ED after his UDS was positive for fentanyl and cannabinoids. Was more agitated after narcan, but didn't really wake up. Received 1L IVF. Was trialed on bipap for 2 hours to see if he would wake up more, but he did not wake up--however HR and BP did improve.  *4/3 CT head: no acute intracranial process  *Of note this is very similar presentation to 9/8/23 hospitalization where he was found down, given narcan and then remained encephalopathic for 20h until he came back around  - Patient's mental status currently at his baseline.  Neurology consult appreciated and neurology has signed off  -Pain management consulted and pain management added Tylenol " "975 mg every 8 hours  vistaril 25 mg every 6 hours prn, Baclofen 5 mg at bedtime, then increased to 5 mg po BID  - Patient is not a good candidate for on going opoid use due to multiple overdoses and polysubstance abuse including fentanyl and meth, both well documented.   - Declined initiation of suboxone   - Patient mental status back to baseline  - discussed about safe discharge planning; there were some concerns about him returning home with inability to take care of hinself and recent drug use; he denies using \"any street drugs\", only \"some muscle relaxants\"; discussed about Utox positive for Fentanyl but he denies using it. TCU recommended but he adamantly refused to go to TCU; he states that he ended up with lleg wounds after he stayed in TCU in the past.   -Psychiatry consult appreciated- see note; the patient seems to have decisional capacity to refuse TCU  -he is not willing to stay to meet with CD consult  - he expressed that a friend will come and meet him at his home and help him; he also said that he is planning to sell his home soon and move to [place] to live with his sister; he spoke with his sister over the phone and told her about his discharge plans  - he said he has a follow up appointment with his PCP next day and he \"gets a weekly Utox\"  - SW consult appreciated  - prior HC agency not willing to follow the patient anymore; new referral placed;  will try to find another home care agency   - no narcotics prescribed at the time of the discharge, as per Pain management recommendations; he is aware of this plan and agrees with it.      Coccyx wound and toe abrasions  Pressure Injury Stage: either Stage 2 or 3 deferring definitive staging until wound base has evolved, present on admission   Cellulitis LE  Toe wounds  - initially started on Zosyn/Vanco  - WOC consult  - Infectious consult appreciated;  Zosyn and vancomycin stoppedd  - Keflex for mild cellulitis started with infectious " disease  - Follow blood cultures  - Wound care following   - Podiatry consulted; Xray b/l feet- Diffuse osseous demineralization. Mild degenerative arthrosis involving multiple joints of the midfoot and forefoot bilaterally. No acute fracture or evidence of osteomyelitis. Partial visualization of an intramedullary luis manuel within the left tibia.  - Vascular surgery has been consulted and ABIs done today revealed normal exam  - Vascular and Podiatry surgery recommending no surgical intervention.  - Wound care as per WOC  - discharge on po Keflex for 2 more days     Elevated troponin, suspect type 2 NSTEMI  -Troponins have been flat with no anginal pain and this is likely due to demand    T9 Paraplegia (HRC)  Neurogenic bladder and bowel  Chronic constipation  - ostomy RN consulted  - resume PTA bowel regimen     Recent sepsis/UTI due to self cath  Admitted at [facility] 3/20-3/27/24. Enterococcus treated with ampicillin for 7 days, finished treatment on 3/25  - 4/3 UA not concerning for infection  - continue straight cath q6h  - Infectious disease consulted and recommended only Keflex for mild cellulitis     History of Cdiff colitis  -monitor stools  -Continue oral vancomycin prophylaxis while on po ABX due to history of C. Difficile    History of DVT (deep vein thrombosis)  - Resume PTA DOAC, seems he recently switched from xarelto to  eliquis  - Continue eliquis     Esophagitis  Noted during 3/20 admit at [facility]  - continue famotidine BID    Consultations This Hospital Stay   PHARMACY TO DOSE VANCO  CARE MANAGEMENT / SOCIAL WORK IP CONSULT  WOUND OSTOMY CONTINENCE NURSE  IP CONSULT  NEUROLOGY IP CONSULT  PHARMACY TO DOSE VANCO  PODIATRY IP CONSULT  VASCULAR SURGERY IP CONSULT  INFECTIOUS DISEASES IP CONSULT  PAIN MANAGEMENT ADULT IP CONSULT  ADDICTION MEDICINE INPATIENT CONSULT  PSYCHIATRY IP CONSULT  PHYSICAL THERAPY ADULT IP CONSULT  OCCUPATIONAL THERAPY ADULT IP CONSULT    Code Status   Full Code    Time  Spent on this Encounter   I, Swathi De Anda MD, personally saw the patient today and spent greater than 30 minutes discharging this patient.       Swathi De Anda MD  [place] [address] [facility]  [address] [address]  Phone: [phone]  ______________________________________________________________________    Physical Exam   Vital Signs: Temp: 97.3  F (36.3  C) Temp src: Oral BP: 122/76 Pulse: 88   Resp: 20 SpO2: 98 % O2 Device: None (Room air)    Weight: 145 lbs 15.11 oz  General Appearance: Awake, alert, NAD  Respiratory: bilateral air entry, no wheezing, no rales, no crackles  Cardiovascular: S1S2, RRR, no murmurs  GI: abd-soft, nonT, BS present  Skin: bilateral feet wounds  Neuro: AAOX3, paraplegia        Primary Care Physician   Sung Hollis    Discharge Orders      Home Care Referral      Follow-up and recommended labs and tests     1.  Follow up at the wound clinic for sacral and lower extremity wounds.  Wound Clinic Referral Options:     [facility] [place]   [address] [place] [address]   P - [phone]       2.   [facility] [place]   [address] ([facility]   [place] [address]   P  [phone]   [facility] [phone]    3.   [facility]   [address] [place] [address]   P - [phone]   F  [phone]        2.  Follow up with one of the following services for routine foot care:    Here is a list of routine foot care resources, which includes toenail trimming and callus/corn management.     This is not a referral. It is your responsibility to contact the organization and your insurance to confirm cost and coverage.      ROUTINE FOOT CARE (NAIL TRIMMING / CALLUSES)      [facility]  [phone]   [facility]  [phone]  Multiple locations  [facility]  [phone] Dr. Ortiz and   [place]  [address]  [place] [address]  [phone]  [facility]  [phone]  [email or web address]     Wound care and dressings    Instructions to care for your wound at home:   Bilateral toe wound(s): Every other day and PRN if dressings are loose or soiled  Cleanse wound bed with Vashe wound cleanser (# [id number]) and pat dry gently.  Cut a piece of Aquacel AG(#589872) to fit onto the wound beds on the top of the toes  In addition, cut long strips of Aquacel AG and weave in between toes to assist with moisture management.   Ensure to cover entire wound bed.  May apply guaze 4x4 to cover if needed and then a stockinette to hold dressing in place, may utilize socks if they are an appropriate fit  Change dressing every other day.     Right heel: Every three days  Cleanse the wound with Vashe wound cleanser (# 791066) and pat dry.  Apply No sting film barrier to periwound skin.  Cover wound with dime thick layer of Triad paste (#608440) to fill wound.   Apply a thin layer of Triad paste over the yelllow/brown callous like tissue surrounding the wound.  Cover wound with 4x4 Mepilex flex (#415065).  With repeated dressing changes clean off top/ soiled layer of paste only and reapply. If complete removal of paste is needed use baby oil (#910688) to aid in removal.  Turn and reposition Q 2hrs side to side only.     Left heel: every three days  Cleanse the area with NS and pat dry.  Apply No sting film barrier to periwound skin.  Cover wound with 4x4 Mepilex flex (#058709).  Turn and reposition Q 2hrs side to side only.     Reason for your hospital stay    Altered mental status     Follow-up and recommended labs and tests     Follow up with primary care provider, Sung Hollis, within 7 days for hospital follow- up.     Activity    Your activity upon discharge: activity as tolerated     Diet    Follow this diet upon discharge: Orders Placed This Encounter      Snacks/Supplements Adult: Magic Cup;  With Meals      Combination Diet Regular Diet Adult       Significant Results and Procedures   Most Recent 3 CBC's:  Recent Labs   Lab Test 04/08/24  1244 04/07/24  1546 04/04/24  0911   WBC 7.9 10.2 11.1*   HGB 11.0* 10.3* 10.4*   MCV 76* 75* 78    340 327     Most Recent 3 BMP's:  Recent Labs   Lab Test 04/08/24  1244 04/07/24  1546 04/04/24  0911    135 144   POTASSIUM 4.6 4.6 4.1   CHLORIDE 101 99 110*   CO2 20* 20* 22   BUN 29.7* 29.1* 18.1   CR 0.64* 0.70 0.51*   ANIONGAP 15 16* 12   MAK 9.1 8.8 8.9   GLC 88 93 91     Most Recent 2 LFT's:  Recent Labs   Lab Test 04/03/24  1514 09/08/23 1952   AST 18 44   ALT 15 26   ALKPHOS 106 171*   BILITOTAL <0.2 0.3     Most Recent 3 INR's:  Recent Labs   Lab Test 08/16/18  2230   INR 1.02     Most Recent 3 Creatinines:  Recent Labs   Lab Test 04/08/24  1244 04/07/24  1546 04/04/24  0911   CR 0.64* 0.70 0.51*     Most Recent 3 Hemoglobins:  Recent Labs   Lab Test 04/08/24  1244 04/07/24  1546 04/04/24  0911   HGB 11.0* 10.3* 10.4*     Most Recent 3 Troponin's:No lab results found.  Most Recent 3 BNP's:No lab results found.  7-Day Micro Results       Collected Updated Procedure Result Status      04/03/2024 1714 04/08/2024 1916 Blood Culture Peripheral Blood [[id number]]   Peripheral Blood    Final result Component Value   Culture No Growth               04/03/2024 1647 04/08/2024 1916 Blood Culture Peripheral Blood [[id number]]   Peripheral Blood    Final result Component Value   Culture No Growth               04/03/2024 1523 04/06/2024 0748 Urine Culture [[id number]]    (Abnormal)   Urine, Catheter    Final result Component Value   Culture 50,000-100,000 CFU/mL Enterococcus faecium VRE        Susceptibility        Enterococcus faecium VRE      LEONEL      Ampicillin >=32 ug/mL Resistant      Daptomycin 4 ug/mL Susceptible Dose Dependent      Linezolid 2 ug/mL Susceptible      Nitrofurantoin 64 ug/mL Intermediate      Vancomycin >=32 ug/mL Resistant        "               Susceptibility Comments       Enterococcus faecium VRE    Antibiotics listed as \"No Interpretation\" have no regulatory guidelines for susceptibility/resistance available.  VRE requires contact precautions.                       Most Recent TSH and T4:No lab results found.  Most Recent Hemoglobin A1c:No lab results found.  Most Recent 6 glucoses:  Recent Labs   Lab Test 04/08/24  1244 04/07/24  1546 04/04/24  0911 04/03/24  1516 04/03/24  1514 09/11/23  1041   GLC 88 93 91 88 102* 97     Most Recent Urinalysis:  Recent Labs   Lab Test 04/03/24  1523   COLOR Light Yellow   APPEARANCE Clear   URINEGLC Negative   URINEBILI Negative   URINEKETONE Negative   SG 1.025   UBLD Negative   URINEPH 6.5   PROTEIN Negative   NITRITE Negative   LEUKEST Negative   RBCU 5*   WBCU 2     Most Recent ABG:No lab results found.  Most Recent ESR & CRP:No lab results found.  Most Recent CPK:  Recent Labs   Lab Test 04/03/24  1514   CKT 98   ,   Results for orders placed or performed during the hospital encounter of 04/03/24   CT Head w/o Contrast    Narrative    EXAM: CT HEAD W/O CONTRAST  LOCATION: [facility]  DATE: 4/3/2024    INDICATION: altered mental status  COMPARISON: 12/30/2023.  TECHNIQUE: Routine CT Head without IV contrast. Multiplanar reformats. Dose reduction techniques were used.    FINDINGS:  INTRACRANIAL CONTENTS: No intracranial hemorrhage, extraaxial collection, or mass effect.  No CT evidence of acute infarct. Normal parenchymal attenuation. Normal ventricles and sulci.     VISUALIZED ORBITS/SINUSES/MASTOIDS: No intraorbital abnormality. Mucosal thickening primarily involving the ethmoid air cells. No middle ear or mastoid effusion.    BONES/SOFT TISSUES: No acute abnormality.      Impression    IMPRESSION:  1.  No acute intracranial process.   US MARIELENA Doppler No Exercise    Narrative    ULTRASOUND MARIELENA DOPPLER NO EXERCISE, 1-2 LEVELS, BILATERAL April 5, 2024 9:31 AM     HISTORY: " Toe pressures.    COMPARISON: None.    FINDINGS:  Right MARIELENA:   PT: 148, index of 1.12.  DP: 142, index of 1.08.    Left MARIELENA:   PT: 136, index of 1.03.  DP: 146, index of 1.11.     Right Digital brachial index: 148, index of 1.12.  Left Digital Brachial index: 100, index of 0.76.    Waveforms: Distal posterior tibial and dorsalis pedis waveforms are  intact. Digital waveforms intact.      Impression    IMPRESSION: Normal MARIELENA examination.    MARIELENA CRITERIA:  >1.4 NC  0.95-1.4 Normal  0.90 - 0.94 Mild  0.5 - 0.89 Moderate  0.2 - 0.49 Severe  <0.2 Critical    GIANNA MILTON MD         SYSTEM ID:  [id number]   XR Foot Bilateral G/E 3 Views    Narrative    EXAM: XR FOOT BILATERAL G/E 3 VIEWS  DATE/TIME: 4/5/2024 1:11 PM    INDICATION: ulcer to heel, sore/infection to toes.  unknown if trauma  COMPARISON: None available.       Impression    IMPRESSION: Diffuse osseous demineralization. Mild degenerative  arthrosis involving multiple joints of the midfoot and forefoot  bilaterally. No acute fracture or evidence of osteomyelitis. Partial  visualization of an intramedullary luis manuel within the left tibia.    STELLA CLEMENTS DO         SYSTEM ID:  [id number]       Discharge Medications   Current Discharge Medication List        START taking these medications    Details   acetaminophen (TYLENOL) 325 MG tablet Take 3 tablets (975 mg) by mouth every 8 hours  Qty: 60 tablet, Refills: 0    Comments: Future refills by PCP Dr. Sung Hollis with phone number [phone].  Associated Diagnoses: Chronic buttock pain      cephALEXin (KEFLEX) 250 MG capsule Take 1 capsule (250 mg) by mouth every 8 hours for 2 days  Qty: 6 capsule, Refills: 0    Associated Diagnoses: Cellulitis of toe, unspecified laterality           CONTINUE these medications which have CHANGED    Details   vancomycin (VANCOCIN) 125 MG capsule Take 1 capsule (125 mg) by mouth 2 times daily for 8 days  Qty: 16 capsule, Refills: 0    Associated Diagnoses: History of  Clostridioides difficile colitis           CONTINUE these medications which have NOT CHANGED    Details   apixaban ANTICOAGULANT (ELIQUIS) 5 MG tablet Take 1 tablet (5 mg) by mouth 2 times daily  Qty: 30 tablet, Refills: 0    Associated Diagnoses: History of deep venous thrombosis      baclofen (LIORESAL) 5 mg TABS half-tab Take 5 mg by mouth 2 times daily      ferrous sulfate (FEROSUL) 325 (65 Fe) MG tablet Take 325 mg by mouth daily (with breakfast)      folic acid (FOLVITE) 1 MG tablet Take 1 mg by mouth daily      hydrOXYzine HCl (ATARAX) 25 MG tablet Take 25 mg by mouth every 8 hours as needed      naloxone (NARCAN) 4 MG/0.1ML nasal spray Spray 4 mg into one nostril alternating nostrils as needed for opioid reversal every 2-3 minutes until assistance arrives      oxybutynin (DITROPAN) 5 MG tablet Take 5 mg by mouth 3 times daily      polyethylene glycol (MIRALAX) 17 GM/Dose powder Take 17 g by mouth daily as needed for constipation  Qty: 510 g, Refills: 0    Associated Diagnoses: Constipation, unspecified constipation type      senna-docusate (SENOKOT-S/PERICOLACE) 8.6-50 MG tablet Take 1 tablet by mouth 2 times daily    Associated Diagnoses: Constipation, unspecified constipation type           STOP taking these medications       HYDROcodone-acetaminophen (NORCO)  MG per tablet Comments:   Reason for Stopping:         oxyCODONE (ROXICODONE) 5 MG tablet Comments:   Reason for Stopping:             Allergies   Allergies   Allergen Reactions    Sertraline Other (See Comments)     Other reaction(s): Gastrointestinal  Other reaction(s): Gastrointestinal

## 2024-04-08 NOTE — PROGRESS NOTES
Care Management Follow Up    Length of Stay (days): 5    Expected Discharge Date: 04/08/2024     Concerns to be Addressed: care coordination/care conferences, basic needs, cognitive/perceptual, compliance issue, decision making, discharge planning, home safety, substance/tobacco abuse/use     Patient plan of care discussed at interdisciplinary rounds: Yes    Anticipated Discharge Disposition: Skilled Nursing Facility     Anticipated Discharge Services:    Anticipated Discharge DME:      Patient/family educated on Medicare website which has current facility and service quality ratings:    Education Provided on the Discharge Plan:    Patient/Family in Agreement with the Plan: yes    Referrals Placed by CM/SW:    Private pay costs discussed:     Additional Information:  Met with patient at his request.  He wanted to explain he did not intentionally take any medication to overdose.  He reports he had run out of oxycodone and took a muscle relaxant a friend had given him.  He asked if he can go home today.  He points to his custom w/c which his friend brought in for transport. He asked writer to arrange transport.   Writer explained concern expressed by his sister and a friend that he is not able to care for himself at home and that [facility] agrees and will no longer offer him home care services.  His response is that he can go to the [facility] wound care clinic if needed. He reports he was a patient there in the past.  Patient reports within the next month he plans to sell his house, to buy a handicap van, fly his sister here from [place] and she can drive him back there.   He reports his friend Vasile will provide help to him until he can move to [place].  Patient does have the need for home RN to provide wound care 3x a week to his toes and heals.   It may be difficult to secure a home care agency since [facility] did close patient to services and did make a [facility] report.   Gayathri Staton's  consult reviewed and noted patient siting poor care at a TCU as the reason he doesn't want to return to a TCU. Note patient is decisional to make his discharge plan.  Dr De Anda reports patient did speak with his sister and his plan remains to return home.   She will discharge patient and enter orders for home RN.     At approximately 1830 bedside RN patient is ready for discharge. Writer spoke with patient lter over the phone.  He states his friend is at his house and will assist him when he returns home tonight.  [facility] is scheduled to transport patient this evening arriving at around 1900.  Writer will make a referral to [facility].  If they are unable to accept this referral they will search for other home care providers.    Writer will make another AP report to report concern with patient's discharge to home.     Alfreda Moore, NEALSW

## 2024-04-08 NOTE — PROGRESS NOTES
Patient straight cathed per bedside RN request. Scanned earlier for 600, patient did have leaking on brief and pad. Straight cathed for 400mL yellow clear urine, linens, brief, pad and gown changed.

## 2024-04-25 NOTE — PROGRESS NOTES
Care Transitions Post Discharge Update.  Writer received a call from [address] thru [facility] [phone]. She reports she has been making visits for the last 3 weeks.  She explains Mr Lopez is asking for meal resources.  Writer emailed Yeniefr a list of meals thru [employer], [employer] and [employer].  She will be visiting patient tomorrow.

## 2024-06-12 ENCOUNTER — PATIENT OUTREACH (OUTPATIENT)
Dept: CARE COORDINATION | Facility: CLINIC | Age: 64
End: 2024-06-12
Payer: COMMERCIAL

## 2024-06-12 NOTE — PROGRESS NOTES
Clinical Product Navigator RN reviewed chart; patient on payer product coverage.  Review results:   CPN Initial Information Gathering  Referral Source: Health Plan    Not met any any referral criteria at this time.  Will monitor for future needs. Patient identified by Health Plan as a potential candidate for Primary Care Coordination due to recent admission. Per chart review, patient's PCP is Dr. Jimenez Thomson with [facility]. Patient is not a candidate for Primary Care Coordination with [facility]. CPN program closed.     Lilian Pacheco RN  Clinical Product Navigator  Ph: [phone]

## 2024-08-15 ENCOUNTER — TRANSCRIBE ORDERS (OUTPATIENT)
Dept: GASTROENTEROLOGY | Facility: CLINIC | Age: 64
End: 2024-08-15
Payer: COMMERCIAL

## 2024-08-15 ENCOUNTER — HOSPITAL ENCOUNTER (OUTPATIENT)
Facility: CLINIC | Age: 64
End: 2024-08-15
Attending: COLON & RECTAL SURGERY | Admitting: COLON & RECTAL SURGERY
Payer: COMMERCIAL

## 2024-08-15 ENCOUNTER — TELEPHONE (OUTPATIENT)
Dept: GASTROENTEROLOGY | Facility: CLINIC | Age: 64
End: 2024-08-15
Payer: COMMERCIAL

## 2024-08-15 DIAGNOSIS — S36.60XA: Primary | ICD-10-CM

## 2024-08-15 NOTE — TELEPHONE ENCOUNTER
REMINDER: We do not accept [employer] insurance.      Procedure Scheduled: FLEXIBLE SIGMOIDOSCOPY [Flex Sig]  Procedure Date: /9/3/2024  [facility] [place] [address]  Endoscopist: ALICIA  Ordering Provider: ALICIA  Scheduled by (per the direction of): Fax from [facility].

## 2024-10-13 ENCOUNTER — HEALTH MAINTENANCE LETTER (OUTPATIENT)
Age: 64
End: 2024-10-13

## 2024-12-26 ENCOUNTER — HOSPITAL ENCOUNTER (EMERGENCY)
Facility: CLINIC | Age: 64
Discharge: HOME | End: 2024-12-26
Attending: EMERGENCY MEDICINE
Payer: COMMERCIAL

## 2024-12-26 VITALS
BODY MASS INDEX: 22.96 KG/M2 | HEART RATE: 71 BPM | DIASTOLIC BLOOD PRESSURE: 69 MMHG | WEIGHT: 160 LBS | SYSTOLIC BLOOD PRESSURE: 125 MMHG | OXYGEN SATURATION: 100 % | RESPIRATION RATE: 18 BRPM | TEMPERATURE: 99.2 F

## 2024-12-26 DIAGNOSIS — N39.0 COMPLICATED UTI (URINARY TRACT INFECTION): ICD-10-CM

## 2024-12-26 LAB
ALBUMIN UR-MCNC: 30 MG/DL
ANION GAP SERPL CALCULATED.3IONS-SCNC: 12 MMOL/L (ref 7–15)
APPEARANCE UR: ABNORMAL
BASOPHILS # BLD AUTO: 0.1 10E3/UL (ref 0–0.2)
BASOPHILS NFR BLD AUTO: 1 %
BILIRUB UR QL STRIP: NEGATIVE
BUN SERPL-MCNC: 19.5 MG/DL (ref 8–23)
CALCIUM SERPL-MCNC: 9.3 MG/DL (ref 8.8–10.4)
CHLORIDE SERPL-SCNC: 102 MMOL/L (ref 98–107)
COLOR UR AUTO: YELLOW
CREAT SERPL-MCNC: 0.76 MG/DL (ref 0.67–1.17)
EGFRCR SERPLBLD CKD-EPI 2021: >90 ML/MIN/1.73M2
EOSINOPHIL # BLD AUTO: 0.4 10E3/UL (ref 0–0.7)
EOSINOPHIL NFR BLD AUTO: 4 %
ERYTHROCYTE [DISTWIDTH] IN BLOOD BY AUTOMATED COUNT: 19 % (ref 10–15)
FLUAV RNA SPEC QL NAA+PROBE: NEGATIVE
FLUBV RNA RESP QL NAA+PROBE: NEGATIVE
GLUCOSE SERPL-MCNC: 92 MG/DL (ref 70–99)
GLUCOSE UR STRIP-MCNC: NEGATIVE MG/DL
HCO3 SERPL-SCNC: 23 MMOL/L (ref 22–29)
HCT VFR BLD AUTO: 35.9 % (ref 40–53)
HGB BLD-MCNC: 11 G/DL (ref 13.3–17.7)
HGB UR QL STRIP: ABNORMAL
IMM GRANULOCYTES # BLD: 0.1 10E3/UL
IMM GRANULOCYTES NFR BLD: 0 %
KETONES UR STRIP-MCNC: NEGATIVE MG/DL
LACTATE SERPL-SCNC: 1.1 MMOL/L (ref 0.7–2)
LEUKOCYTE ESTERASE UR QL STRIP: ABNORMAL
LYMPHOCYTES # BLD AUTO: 1.9 10E3/UL (ref 0.8–5.3)
LYMPHOCYTES NFR BLD AUTO: 16 %
MCH RBC QN AUTO: 21.4 PG (ref 26.5–33)
MCHC RBC AUTO-ENTMCNC: 30.6 G/DL (ref 31.5–36.5)
MCV RBC AUTO: 70 FL (ref 78–100)
MONOCYTES # BLD AUTO: 1 10E3/UL (ref 0–1.3)
MONOCYTES NFR BLD AUTO: 9 %
MUCOUS THREADS #/AREA URNS LPF: PRESENT /LPF
NEUTROPHILS # BLD AUTO: 8.5 10E3/UL (ref 1.6–8.3)
NEUTROPHILS NFR BLD AUTO: 71 %
NITRATE UR QL: NEGATIVE
NRBC # BLD AUTO: 0 10E3/UL
NRBC BLD AUTO-RTO: 0 /100
PH UR STRIP: 7.5 [PH] (ref 5–7)
PLATELET # BLD AUTO: 504 10E3/UL (ref 150–450)
POTASSIUM SERPL-SCNC: 4.6 MMOL/L (ref 3.4–5.3)
RBC # BLD AUTO: 5.14 10E6/UL (ref 4.4–5.9)
RBC URINE: 90 /HPF
RSV RNA SPEC NAA+PROBE: NEGATIVE
SARS-COV-2 RNA RESP QL NAA+PROBE: NEGATIVE
SODIUM SERPL-SCNC: 137 MMOL/L (ref 135–145)
SP GR UR STRIP: 1.02 (ref 1–1.03)
SQUAMOUS EPITHELIAL: 3 /HPF
UROBILINOGEN UR STRIP-MCNC: NORMAL MG/DL
WBC # BLD AUTO: 11.9 10E3/UL (ref 4–11)
WBC CLUMPS #/AREA URNS HPF: PRESENT /HPF
WBC URINE: >182 /HPF
YEAST #/AREA URNS HPF: ABNORMAL /HPF

## 2024-12-26 PROCEDURE — 80048 BASIC METABOLIC PNL TOTAL CA: CPT | Performed by: EMERGENCY MEDICINE

## 2024-12-26 PROCEDURE — 36415 COLL VENOUS BLD VENIPUNCTURE: CPT | Performed by: EMERGENCY MEDICINE

## 2024-12-26 PROCEDURE — 85041 AUTOMATED RBC COUNT: CPT | Performed by: EMERGENCY MEDICINE

## 2024-12-26 PROCEDURE — 250N000011 HC RX IP 250 OP 636: Performed by: EMERGENCY MEDICINE

## 2024-12-26 PROCEDURE — 85004 AUTOMATED DIFF WBC COUNT: CPT | Performed by: EMERGENCY MEDICINE

## 2024-12-26 PROCEDURE — 81001 URINALYSIS AUTO W/SCOPE: CPT | Performed by: EMERGENCY MEDICINE

## 2024-12-26 PROCEDURE — 82565 ASSAY OF CREATININE: CPT | Performed by: EMERGENCY MEDICINE

## 2024-12-26 PROCEDURE — 87637 SARSCOV2&INF A&B&RSV AMP PRB: CPT | Performed by: EMERGENCY MEDICINE

## 2024-12-26 PROCEDURE — 87040 BLOOD CULTURE FOR BACTERIA: CPT | Performed by: EMERGENCY MEDICINE

## 2024-12-26 PROCEDURE — 250N000013 HC RX MED GY IP 250 OP 250 PS 637: Performed by: EMERGENCY MEDICINE

## 2024-12-26 PROCEDURE — 83605 ASSAY OF LACTIC ACID: CPT | Performed by: EMERGENCY MEDICINE

## 2024-12-26 RX ORDER — OXYCODONE AND ACETAMINOPHEN 5; 325 MG/1; MG/1
2 TABLET ORAL ONCE
Status: COMPLETED | OUTPATIENT
Start: 2024-12-26 | End: 2024-12-26

## 2024-12-26 RX ORDER — GABAPENTIN 100 MG/1
200 CAPSULE ORAL ONCE
Status: COMPLETED | OUTPATIENT
Start: 2024-12-26 | End: 2024-12-26

## 2024-12-26 RX ORDER — LINEZOLID 2 MG/ML
600 INJECTION, SOLUTION INTRAVENOUS ONCE
Status: COMPLETED | OUTPATIENT
Start: 2024-12-26 | End: 2024-12-26

## 2024-12-26 RX ORDER — LINEZOLID 600 MG/1
600 TABLET, FILM COATED ORAL 2 TIMES DAILY
Qty: 28 TABLET | Refills: 0 | Status: SHIPPED | OUTPATIENT
Start: 2024-12-26 | End: 2025-01-09

## 2024-12-26 RX ADMIN — OXYCODONE HYDROCHLORIDE AND ACETAMINOPHEN 2 TABLET: 5; 325 TABLET ORAL at 13:39

## 2024-12-26 RX ADMIN — GABAPENTIN 200 MG: 100 CAPSULE ORAL at 14:29

## 2024-12-26 RX ADMIN — LINEZOLID 600 MG: 600 INJECTION, SOLUTION INTRAVENOUS at 14:29

## 2024-12-26 ASSESSMENT — ACTIVITIES OF DAILY LIVING (ADL)
ADLS_ACUITY_SCORE: 58

## 2024-12-26 ASSESSMENT — COLUMBIA-SUICIDE SEVERITY RATING SCALE - C-SSRS
1. IN THE PAST MONTH, HAVE YOU WISHED YOU WERE DEAD OR WISHED YOU COULD GO TO SLEEP AND NOT WAKE UP?: NO
2. HAVE YOU ACTUALLY HAD ANY THOUGHTS OF KILLING YOURSELF IN THE PAST MONTH?: NO
6. HAVE YOU EVER DONE ANYTHING, STARTED TO DO ANYTHING, OR PREPARED TO DO ANYTHING TO END YOUR LIFE?: NO

## 2024-12-26 NOTE — ED TRIAGE NOTES
Patient arrives with complaints of a UTI. Patient self caths. Hospitalized almost 2 weeks ago for similar. Patient also mentions in triage that he has pressure ulcers from his hospitalization at [facility] and cannot be sitting on them.

## 2024-12-26 NOTE — ED PROVIDER NOTES
"  Emergency Department Note      History of Present Illness     Chief Complaint   UTI      HPI   Jose Lopez is a 64 year old male with history of neurogenic bladder needing to straight catheterize, neurogenic bowel, paraplegia, UTI and sepsis presenting to the ER with concerns for UTI.  He presents here because his home nurse (comes 3 times a week) told him to come to the hospital because his urine has been foul smelling, he is leaking urine which aggravates his chronic buttock ulcers, and he has bladder pain concerning for UTI.  He normally goes to [facility] but \"they don't do anything for me there so my nurse told me to come here\".  States he was seen at [facility] 12/13 and was started on antibiotics for UTI.  States his urine was fine with antibiotics but after finishing, his symptoms returned.  Reports subjective fever, chills, bladder pain, foul smelling urine.  Denies URI symptoms, cough, flank pain, diarrhea.      Upon entering the room, patient requests his home pain meds that he didn't take today.     Independent Historian   None    Review of External Notes   I reviewed patient's [facility] ER visit 12/14/24.   I reviewed patient's office visit 12/24/24.     Past Medical History     Medical History and Problem List   Past Medical History:   Diagnosis Date    Benzodiazepine dependence (H)     Muscle spasticity     Neurogenic bladder     Neurogenic bowel     Paraplegia (H)     Sepsis (H)     Spinal cord injury at T9 level (H) 1994    Substance abuse (H)     UTI (urinary tract infection)        Medications   linezolid (ZYVOX) 600 MG tablet  acetaminophen (TYLENOL) 325 MG tablet  apixaban ANTICOAGULANT (ELIQUIS) 5 MG tablet  baclofen (LIORESAL) 5 mg TABS half-tab  ferrous sulfate (FEROSUL) 325 (65 Fe) MG tablet  folic acid (FOLVITE) 1 MG tablet  hydrOXYzine HCl (ATARAX) 25 MG tablet  naloxone (NARCAN) 4 MG/0.1ML nasal spray  oxybutynin (DITROPAN) 5 MG tablet  polyethylene glycol (MIRALAX) 17 GM/Dose " powder  senna-docusate (SENOKOT-S/PERICOLACE) 8.6-50 MG tablet        Surgical History   Past Surgical History:   Procedure Laterality Date    BACK SURGERY      CHOLECYSTECTOMY      EXAM UNDER ANESTHESIA RECTUM N/A 5/1/2019    Procedure: EXAM UNDER ANESTHESIA, RECTUM;  Surgeon: Tiburcio Barros MD;  Location: [facility] OR    FISTULOTOMY RECTUM N/A 5/1/2019    Procedure: FISTULOTOMY AND REMOVAL OF ANAL SKIN TAGS;  Surgeon: Tiburcio Barros MD;  Location: [facility] OR    IR LUMBAR PUNCTURE  5/5/2021    IR LUMBAR PUNCTURE  5/18/2022    IR PICC PLACEMENT > 5 YRS OF AGE  12/10/2022    ORTHOPEDIC SURGERY         Physical Exam     Patient Vitals for the past 24 hrs:   BP Temp Temp src Pulse Resp SpO2 Weight   12/26/24 1600 125/69 -- -- 71 -- 100 % --   12/26/24 1500 109/80 -- -- 78 -- 98 % --   12/26/24 1400 139/79 -- -- 66 -- 99 % --   12/26/24 1300 137/85 -- -- 75 -- 100 % --   12/26/24 1200 123/79 -- -- 64 -- 99 % --   12/26/24 1108 136/79 99.2  F (37.3  C) Temporal 72 18 100 % 72.6 kg (160 lb)     Physical Exam  General: Alert, no acute distress; lying on gurney  HEENT:  Moist mucous membranes.  Conjunctiva normal.   CV:  RRR, no m/r/g, skin warm and well perfused  Pulm:  CTAB, no wheezes/ronchi/rales.  No acute distress, breathing comfortably  GI:  Soft, mild suprapubic tenderness, nondistended.  No rebound or guarding.  Normal bowel sounds  MSK:  Moving all extremities.  No focal areas of edema, erythema, or tenderness  Skin:  WWP, no rashes, no lower extremity edema; chronic buttock wounds covered with Mepliex, no surrounding erythema  Psych:  Well-appearing, normal affect    Diagnostics     Lab Results   Labs Ordered and Resulted from Time of ED Arrival to Time of ED Departure   ROUTINE UA WITH MICROSCOPIC REFLEX TO CULTURE - Abnormal       Result Value    Color Urine Yellow      Appearance Urine Slightly Cloudy (*)     Glucose Urine Negative      Bilirubin Urine Negative      Ketones Urine Negative      Specific Gravity Urine  1.020      Blood Urine Moderate (*)     pH Urine 7.5 (*)     Protein Albumin Urine 30 (*)     Urobilinogen Urine Normal      Nitrite Urine Negative      Leukocyte Esterase Urine Large (*)     WBC Clumps Urine Present (*)     Hyphal Yeast Urine Few (*)     Mucus Urine Present (*)     RBC Urine 90 (*)     WBC Urine >182 (*)     Squamous Epithelials Urine 3 (*)    CBC WITH PLATELETS AND DIFFERENTIAL - Abnormal    WBC Count 11.9 (*)     RBC Count 5.14      Hemoglobin 11.0 (*)     Hematocrit 35.9 (*)     MCV 70 (*)     MCH 21.4 (*)     MCHC 30.6 (*)     RDW 19.0 (*)     Platelet Count 504 (*)     % Neutrophils 71      % Lymphocytes 16      % Monocytes 9      % Eosinophils 4      % Basophils 1      % Immature Granulocytes 0      NRBCs per 100 WBC 0      Absolute Neutrophils 8.5 (*)     Absolute Lymphocytes 1.9      Absolute Monocytes 1.0      Absolute Eosinophils 0.4      Absolute Basophils 0.1      Absolute Immature Granulocytes 0.1      Absolute NRBCs 0.0     BASIC METABOLIC PANEL - Normal    Sodium 137      Potassium 4.6      Chloride 102      Carbon Dioxide (CO2) 23      Anion Gap 12      Urea Nitrogen 19.5      Creatinine 0.76      GFR Estimate >90      Calcium 9.3      Glucose 92     LACTIC ACID WHOLE BLOOD WITH 1X REPEAT IN 2 HR WHEN >2 - Normal    Lactic Acid, Initial 1.1     INFLUENZA A/B, RSV AND SARS-COV2 PCR - Normal    Influenza A PCR Negative      Influenza B PCR Negative      RSV PCR Negative      SARS CoV2 PCR Negative     BLOOD CULTURE   BLOOD CULTURE   URINE CULTURE       Imaging   No orders to display       EKG   None    Independent Interpretation   None    ED Course      Medications Administered   Medications   oxyCODONE-acetaminophen (PERCOCET) 5-325 MG per tablet 2 tablet (2 tablets Oral $Given 12/26/24 1339)   gabapentin (NEURONTIN) capsule 200 mg (200 mg Oral $Given 12/26/24 1429)   linezolid (ZYVOX) infusion 600 mg (0 mg Intravenous Stopped 12/26/24 1607)       Procedures   Procedures      Discussion of Management   None    ED Course        Additional Documentation  None    Medical Decision Making / Diagnosis     CMS Diagnoses: IV Antibiotics given and/or elevated Lactate of 1.1 and no sepsis note found - Delete this reminder and enter the sepsis note or '.edcms' before signing chart.>>>None    MIPS       None    [facility]   Jose Lopez is a 64 year old male with history of paraplegia, neurogenic bladder requiring straight catheterization presenting to the emergency department for evaluation of UTI.  Please see above for the details in HPI and exam.  Patient with low-grade fever here, otherwise hemodynamically stable and well-appearing.  Apart from mild suprapubic tenderness, he has a benign abdominal exam that is not consistent with intra-abdominal catastrophe or surgical abdomen.  UA obtained concerning for UTI.  Based on previous urine culture growing VRE, linezolid was given in the emergency department in consultation with our ED pharmacist.  Urine culture and blood cultures pending.  Other labs notable for mild leukocytosis, normal lactic acid.  Limited viral testing is negative for influenza/RSV/COVID-19 and patient denies any significant respiratory symptoms.  Signs/symptoms are not consistent with pyelonephritis.  Given patient's overall well appearance, with reasonable clinical certainty, I do feel that he is safe to discharge home on oral linezolid and I recommend follow-up with his PCP/urologist regarding his ER visit today.  Patient is comfortable with this plan.  He understands that he may receive phone call if blood culture is positive at which point we would like him to represent to the emergency department for admission and IV antibiotics.  Discussed signs/symptoms that should prompt the patient's urgent return to the emergency department.  All questions were answered prior to discharge.    Disposition   The patient was discharged.     Diagnosis     ICD-10-CM    1. Complicated UTI  (urinary tract infection)  N39.0            Discharge Medications   Discharge Medication List as of 12/26/2024  4:07 PM        START taking these medications    Details   linezolid (ZYVOX) 600 MG tablet Take 1 tablet (600 mg) by mouth 2 times daily for 14 days., Disp-28 tablet, R-0, E-Prescribe               MD Rosario Rebolledo Edgar Ronald, MD  12/26/24 1714       Dao Ponce MD  12/26/24 [id number]

## 2024-12-28 LAB
BACTERIA UR CULT: ABNORMAL

## 2024-12-31 LAB
BACTERIA BLD CULT: NO GROWTH
BACTERIA BLD CULT: NO GROWTH

## 2025-03-22 ENCOUNTER — HOSPITAL ENCOUNTER (EMERGENCY)
Facility: CLINIC | Age: 65
Discharge: HOME | End: 2025-03-22
Attending: PHYSICIAN ASSISTANT | Admitting: PHYSICIAN ASSISTANT
Payer: COMMERCIAL

## 2025-03-22 VITALS
TEMPERATURE: 98.1 F | SYSTOLIC BLOOD PRESSURE: 116 MMHG | DIASTOLIC BLOOD PRESSURE: 78 MMHG | HEART RATE: 80 BPM | OXYGEN SATURATION: 100 % | RESPIRATION RATE: 20 BRPM

## 2025-03-22 DIAGNOSIS — K59.2 NEUROGENIC BOWEL: ICD-10-CM

## 2025-03-22 DIAGNOSIS — M62.838 MUSCLE SPASM: ICD-10-CM

## 2025-03-22 DIAGNOSIS — T83.010A SUPRAPUBIC CATHETER DYSFUNCTION, INITIAL ENCOUNTER: ICD-10-CM

## 2025-03-22 DIAGNOSIS — R10.9 ABDOMINAL PAIN, UNSPECIFIED ABDOMINAL LOCATION: ICD-10-CM

## 2025-03-22 LAB
ALBUMIN SERPL BCG-MCNC: 4.3 G/DL (ref 3.5–5.2)
ALP SERPL-CCNC: 141 U/L (ref 40–150)
ALT SERPL W P-5'-P-CCNC: 29 U/L (ref 0–70)
ANION GAP SERPL CALCULATED.3IONS-SCNC: 14 MMOL/L (ref 7–15)
AST SERPL W P-5'-P-CCNC: 26 U/L (ref 0–45)
BASOPHILS # BLD AUTO: 0.1 10E3/UL (ref 0–0.2)
BASOPHILS NFR BLD AUTO: 1 %
BILIRUB SERPL-MCNC: 0.3 MG/DL
BUN SERPL-MCNC: 15.7 MG/DL (ref 8–23)
CALCIUM SERPL-MCNC: 9.4 MG/DL (ref 8.8–10.4)
CHLORIDE SERPL-SCNC: 101 MMOL/L (ref 98–107)
CREAT SERPL-MCNC: 0.58 MG/DL (ref 0.67–1.17)
EGFRCR SERPLBLD CKD-EPI 2021: >90 ML/MIN/1.73M2
EOSINOPHIL # BLD AUTO: 0.3 10E3/UL (ref 0–0.7)
EOSINOPHIL NFR BLD AUTO: 3 %
ERYTHROCYTE [DISTWIDTH] IN BLOOD BY AUTOMATED COUNT: 20.4 % (ref 10–15)
GLUCOSE SERPL-MCNC: 109 MG/DL (ref 70–99)
HCO3 SERPL-SCNC: 22 MMOL/L (ref 22–29)
HCT VFR BLD AUTO: 36.2 % (ref 40–53)
HGB BLD-MCNC: 11.6 G/DL (ref 13.3–17.7)
IMM GRANULOCYTES # BLD: 0 10E3/UL
IMM GRANULOCYTES NFR BLD: 0 %
LYMPHOCYTES # BLD AUTO: 1.5 10E3/UL (ref 0.8–5.3)
LYMPHOCYTES NFR BLD AUTO: 16 %
MCH RBC QN AUTO: 23 PG (ref 26.5–33)
MCHC RBC AUTO-ENTMCNC: 32 G/DL (ref 31.5–36.5)
MCV RBC AUTO: 72 FL (ref 78–100)
MONOCYTES # BLD AUTO: 1 10E3/UL (ref 0–1.3)
MONOCYTES NFR BLD AUTO: 11 %
NEUTROPHILS # BLD AUTO: 6.7 10E3/UL (ref 1.6–8.3)
NEUTROPHILS NFR BLD AUTO: 70 %
NRBC # BLD AUTO: 0 10E3/UL
NRBC BLD AUTO-RTO: 0 /100
PLATELET # BLD AUTO: 338 10E3/UL (ref 150–450)
POTASSIUM SERPL-SCNC: 3.9 MMOL/L (ref 3.4–5.3)
PROT SERPL-MCNC: 7.9 G/DL (ref 6.4–8.3)
RBC # BLD AUTO: 5.05 10E6/UL (ref 4.4–5.9)
SODIUM SERPL-SCNC: 137 MMOL/L (ref 135–145)
WBC # BLD AUTO: 9.6 10E3/UL (ref 4–11)

## 2025-03-22 PROCEDURE — 80053 COMPREHEN METABOLIC PANEL: CPT | Performed by: PHYSICIAN ASSISTANT

## 2025-03-22 PROCEDURE — 250N000013 HC RX MED GY IP 250 OP 250 PS 637: Performed by: PHYSICIAN ASSISTANT

## 2025-03-22 PROCEDURE — 85025 COMPLETE CBC W/AUTO DIFF WBC: CPT | Performed by: PHYSICIAN ASSISTANT

## 2025-03-22 PROCEDURE — 36415 COLL VENOUS BLD VENIPUNCTURE: CPT | Performed by: PHYSICIAN ASSISTANT

## 2025-03-22 PROCEDURE — 99283 EMERGENCY DEPT VISIT LOW MDM: CPT | Performed by: PHYSICIAN ASSISTANT

## 2025-03-22 RX ORDER — OXYCODONE HYDROCHLORIDE 5 MG/1
5 TABLET ORAL ONCE
Status: COMPLETED | OUTPATIENT
Start: 2025-03-22 | End: 2025-03-22

## 2025-03-22 RX ORDER — CYCLOBENZAPRINE HCL 10 MG
10 TABLET ORAL ONCE
Status: COMPLETED | OUTPATIENT
Start: 2025-03-22 | End: 2025-03-22

## 2025-03-22 RX ORDER — CYCLOBENZAPRINE HCL 10 MG
10 TABLET ORAL 3 TIMES DAILY PRN
Qty: 10 TABLET | Refills: 0 | Status: SHIPPED | OUTPATIENT
Start: 2025-03-22

## 2025-03-22 RX ADMIN — OXYCODONE HYDROCHLORIDE 5 MG: 5 TABLET ORAL at 11:43

## 2025-03-22 RX ADMIN — OXYCODONE HYDROCHLORIDE 5 MG: 5 TABLET ORAL at 12:46

## 2025-03-22 RX ADMIN — CYCLOBENZAPRINE 10 MG: 10 TABLET, FILM COATED ORAL at 12:46

## 2025-03-22 ASSESSMENT — COLUMBIA-SUICIDE SEVERITY RATING SCALE - C-SSRS
2. HAVE YOU ACTUALLY HAD ANY THOUGHTS OF KILLING YOURSELF IN THE PAST MONTH?: NO
6. HAVE YOU EVER DONE ANYTHING, STARTED TO DO ANYTHING, OR PREPARED TO DO ANYTHING TO END YOUR LIFE?: NO
1. IN THE PAST MONTH, HAVE YOU WISHED YOU WERE DEAD OR WISHED YOU COULD GO TO SLEEP AND NOT WAKE UP?: NO

## 2025-03-22 ASSESSMENT — ACTIVITIES OF DAILY LIVING (ADL)
ADLS_ACUITY_SCORE: 58
ADLS_ACUITY_SCORE: 58
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
## 2025-03-22 NOTE — DISCHARGE INSTRUCTIONS
Your labs are reassuring today. You will be started on short course of muscle relaxant medication for your leg spasms. Please take as prescribed. Schedule follow-up with primary care this week and call urology to discuss suprapubic site leaking. Return to ED with any new or worsening symptoms such as fevers, vomiting, or pain.
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
## 2025-03-22 NOTE — ED NOTES
Patient discharge planning extra times for dressing and wound cares. Assist with 2 persons for transfer assist. Wound dressing to heel right re-dressed after provider looked at site. Ada A Son, RN

## (undated) DEVICE — GLOVE PROTEXIS W/NEU-THERA 7.5  2D73TE75

## (undated) DEVICE — SPONGE BALL KERLIX ROUND XL W/O STRING LATEX 4935

## (undated) DEVICE — SOL WATER IRRIG 1000ML BOTTLE 2F7114

## (undated) DEVICE — DECANTER BAG 2002S

## (undated) DEVICE — TAPE CLOTH ADHESIVE 3" LATEX FREE

## (undated) DEVICE — PACK MINOR SBA15MIFSE

## (undated) DEVICE — SUCTION TIP YANKAUER W/O VENT K86

## (undated) DEVICE — DRAPE MINOR PROCEDURE LAP 29496

## (undated) DEVICE — NDL 19GA 1.5"

## (undated) DEVICE — ESU CLEANER TIP 31142717

## (undated) DEVICE — SYR BULB IRRIG 50ML LATEX FREE 0035280

## (undated) DEVICE — GLOVE PROTEXIS BLUE W/NEU-THERA 7.5  2D73EB75

## (undated) DEVICE — SUCTION CANISTER MEDIVAC LINER 3000ML W/LID 65651-530

## (undated) DEVICE — SYR 10ML LL W/O NDL 302995

## (undated) DEVICE — PANTIES MESH LG/XLG 2PK 706M2

## (undated) DEVICE — ESU GROUND PAD UNIVERSAL W/O CORD

## (undated) DEVICE — ESU ELEC NDL 1" COATED/INSULATED E1465

## (undated) DEVICE — LINEN TOWEL PACK X5 5464

## (undated) DEVICE — GOWN IMPERVIOUS SPECIALTY XL/XLONG 39049

## (undated) DEVICE — LUBRICATING JELLY 4.25OZ

## (undated) RX ORDER — FENTANYL CITRATE 50 UG/ML
INJECTION, SOLUTION INTRAMUSCULAR; INTRAVENOUS
Status: DISPENSED
Start: 2019-05-01

## (undated) RX ORDER — DEXAMETHASONE SODIUM PHOSPHATE 4 MG/ML
INJECTION, SOLUTION INTRA-ARTICULAR; INTRALESIONAL; INTRAMUSCULAR; INTRAVENOUS; SOFT TISSUE
Status: DISPENSED
Start: 2019-05-01

## (undated) RX ORDER — CELECOXIB 200 MG/1
CAPSULE ORAL
Status: DISPENSED
Start: 2019-05-01

## (undated) RX ORDER — ACETAMINOPHEN 325 MG/1
TABLET ORAL
Status: DISPENSED
Start: 2019-05-01

## (undated) RX ORDER — OXYCODONE HYDROCHLORIDE 5 MG/1
TABLET ORAL
Status: DISPENSED
Start: 2019-05-01

## (undated) RX ORDER — ONDANSETRON 2 MG/ML
INJECTION INTRAMUSCULAR; INTRAVENOUS
Status: DISPENSED
Start: 2019-05-01

## (undated) RX ORDER — NEOSTIGMINE METHYLSULFATE 1 MG/ML
VIAL (ML) INJECTION
Status: DISPENSED
Start: 2019-05-01